# Patient Record
Sex: FEMALE | Race: BLACK OR AFRICAN AMERICAN | NOT HISPANIC OR LATINO | Employment: FULL TIME | RURAL
[De-identification: names, ages, dates, MRNs, and addresses within clinical notes are randomized per-mention and may not be internally consistent; named-entity substitution may affect disease eponyms.]

---

## 2019-10-09 ENCOUNTER — HISTORICAL (OUTPATIENT)
Dept: ADMINISTRATIVE | Facility: HOSPITAL | Age: 19
End: 2019-10-09

## 2019-10-11 LAB
LAB AP CLINICAL INFORMATION: NORMAL
LAB AP COMMENTS: NORMAL
LAB AP GENERAL CAT - HISTORICAL: NORMAL
LAB AP INTERPRETATION/RESULT - HISTORICAL: NEGATIVE
LAB AP SPECIMEN ADEQUACY - HISTORICAL: NORMAL
LAB AP SPECIMEN SUBMITTED - HISTORICAL: NORMAL

## 2020-10-27 ENCOUNTER — HISTORICAL (OUTPATIENT)
Dept: ADMINISTRATIVE | Facility: HOSPITAL | Age: 20
End: 2020-10-27

## 2020-10-27 LAB — HCG UR QL IA.RAPID: NEGATIVE

## 2021-05-17 ENCOUNTER — OFFICE VISIT (OUTPATIENT)
Dept: OBSTETRICS AND GYNECOLOGY | Facility: CLINIC | Age: 21
End: 2021-05-17
Payer: MEDICAID

## 2021-05-17 ENCOUNTER — PROCEDURE VISIT (OUTPATIENT)
Dept: OBSTETRICS AND GYNECOLOGY | Facility: CLINIC | Age: 21
End: 2021-05-17
Payer: MEDICAID

## 2021-05-17 VITALS
TEMPERATURE: 98 F | HEIGHT: 65 IN | RESPIRATION RATE: 18 BRPM | HEART RATE: 88 BPM | SYSTOLIC BLOOD PRESSURE: 120 MMHG | OXYGEN SATURATION: 99 % | BODY MASS INDEX: 34.66 KG/M2 | DIASTOLIC BLOOD PRESSURE: 72 MMHG | WEIGHT: 208 LBS

## 2021-05-17 DIAGNOSIS — N91.2 AMENORRHEA: Primary | ICD-10-CM

## 2021-05-17 LAB
B-HCG UR QL: POSITIVE
CTP QC/QA: YES

## 2021-05-17 PROCEDURE — 76801 OB US < 14 WKS SINGLE FETUS: CPT | Mod: ,,, | Performed by: OBSTETRICS & GYNECOLOGY

## 2021-05-17 PROCEDURE — 81025 POCT URINE PREGNANCY: ICD-10-PCS | Mod: ,,, | Performed by: OBSTETRICS & GYNECOLOGY

## 2021-05-17 PROCEDURE — 81025 URINE PREGNANCY TEST: CPT | Mod: ,,, | Performed by: OBSTETRICS & GYNECOLOGY

## 2021-05-17 PROCEDURE — 76801 PR US, OB <14WKS, TRANSABD, SINGLE GESTATION: ICD-10-PCS | Mod: ,,, | Performed by: OBSTETRICS & GYNECOLOGY

## 2021-05-17 PROCEDURE — 99203 PR OFFICE/OUTPT VISIT, NEW, LEVL III, 30-44 MIN: ICD-10-PCS | Mod: 25,,, | Performed by: OBSTETRICS & GYNECOLOGY

## 2021-05-17 PROCEDURE — 99499 NO LOS: ICD-10-PCS | Mod: ,,, | Performed by: OBSTETRICS & GYNECOLOGY

## 2021-05-17 PROCEDURE — 99499 UNLISTED E&M SERVICE: CPT | Mod: ,,, | Performed by: OBSTETRICS & GYNECOLOGY

## 2021-05-17 PROCEDURE — 99203 OFFICE O/P NEW LOW 30 MIN: CPT | Mod: 25,,, | Performed by: OBSTETRICS & GYNECOLOGY

## 2021-06-14 ENCOUNTER — PROCEDURE VISIT (OUTPATIENT)
Dept: OBSTETRICS AND GYNECOLOGY | Facility: CLINIC | Age: 21
End: 2021-06-14
Attending: OBSTETRICS & GYNECOLOGY
Payer: MEDICAID

## 2021-06-14 ENCOUNTER — ROUTINE PRENATAL (OUTPATIENT)
Dept: OBSTETRICS AND GYNECOLOGY | Facility: CLINIC | Age: 21
End: 2021-06-14
Payer: MEDICAID

## 2021-06-14 VITALS
BODY MASS INDEX: 34.28 KG/M2 | HEART RATE: 85 BPM | DIASTOLIC BLOOD PRESSURE: 80 MMHG | SYSTOLIC BLOOD PRESSURE: 118 MMHG | WEIGHT: 206 LBS

## 2021-06-14 DIAGNOSIS — Z11.3 SCREEN FOR SEXUALLY TRANSMITTED DISEASES: ICD-10-CM

## 2021-06-14 DIAGNOSIS — Z3A.09 9 WEEKS GESTATION OF PREGNANCY: Primary | ICD-10-CM

## 2021-06-14 DIAGNOSIS — O21.9 NAUSEA AND VOMITING DURING PREGNANCY PRIOR TO 22 WEEKS GESTATION: ICD-10-CM

## 2021-06-14 DIAGNOSIS — Z79.899 ENCOUNTER FOR LONG-TERM (CURRENT) USE OF OTHER MEDICATIONS: ICD-10-CM

## 2021-06-14 DIAGNOSIS — R76.8 HSV-2 SEROPOSITIVE: ICD-10-CM

## 2021-06-14 DIAGNOSIS — Z11.4 ENCOUNTER FOR SCREENING FOR HIV: ICD-10-CM

## 2021-06-14 DIAGNOSIS — Z36.9 UNSPECIFIED ANTENATAL SCREENING: ICD-10-CM

## 2021-06-14 DIAGNOSIS — Z3A.09 9 WEEKS GESTATION OF PREGNANCY: ICD-10-CM

## 2021-06-14 DIAGNOSIS — Z72.51 RISK FOR SEXUALLY TRANSMITTED DISEASE: ICD-10-CM

## 2021-06-14 LAB
BASOPHILS # BLD AUTO: 0.03 K/UL (ref 0–0.2)
BASOPHILS NFR BLD AUTO: 0.3 % (ref 0–1)
BILIRUB SERPL-MCNC: NORMAL MG/DL
BLOOD, POC UA: NORMAL
DIFFERENTIAL METHOD BLD: ABNORMAL
EOSINOPHIL # BLD AUTO: 0.07 K/UL (ref 0–0.5)
EOSINOPHIL NFR BLD AUTO: 0.7 % (ref 1–4)
ERYTHROCYTE [DISTWIDTH] IN BLOOD BY AUTOMATED COUNT: 12.4 % (ref 11.5–14.5)
EST. AVERAGE GLUCOSE BLD GHB EST-MCNC: 97 MG/DL
GLUCOSE UR QL STRIP: NORMAL
HBA1C MFR BLD HPLC: 5.5 % (ref 4.5–6.6)
HBV SURFACE AG SERPL QL IA: NORMAL
HCT VFR BLD AUTO: 40.6 % (ref 38–47)
HGB BLD-MCNC: 13.3 G/DL (ref 12–16)
HIV 1+O+2 AB SERPL QL: NORMAL
IMM GRANULOCYTES # BLD AUTO: 0.03 K/UL (ref 0–0.04)
IMM GRANULOCYTES NFR BLD: 0.3 % (ref 0–0.4)
INDIRECT COOMBS: NORMAL
KETONES UR QL STRIP: NORMAL
LEUKOCYTE ESTERASE URINE, POC: NORMAL
LYMPHOCYTES # BLD AUTO: 2.2 K/UL (ref 1–4.8)
LYMPHOCYTES NFR BLD AUTO: 23 % (ref 27–41)
MCH RBC QN AUTO: 27.5 PG (ref 27–31)
MCHC RBC AUTO-ENTMCNC: 32.8 G/DL (ref 32–36)
MCV RBC AUTO: 84.1 FL (ref 80–96)
MONOCYTES # BLD AUTO: 0.7 K/UL (ref 0–0.8)
MONOCYTES NFR BLD AUTO: 7.3 % (ref 2–6)
MPC BLD CALC-MCNC: 10 FL (ref 9.4–12.4)
NEUTROPHILS # BLD AUTO: 6.55 K/UL (ref 1.8–7.7)
NEUTROPHILS NFR BLD AUTO: 68.4 % (ref 53–65)
NITRITE, POC UA: NORMAL
NRBC # BLD AUTO: 0 X10E3/UL
NRBC, AUTO (.00): 0 %
PH, POC UA: 6
PLATELET # BLD AUTO: 379 K/UL (ref 150–400)
PROTEIN, POC: NORMAL
RBC # BLD AUTO: 4.83 M/UL (ref 4.2–5.4)
RH BLD: NORMAL
RUBV IGG SER-ACNC: NORMAL [IU]/ML
SPECIFIC GRAVITY, POC UA: >=1.03
SYPHILIS AB INTERPRETATION: NORMAL
TRICHOMONAS NAT: NEGATIVE
UROBILINOGEN, POC UA: 0.2
WBC # BLD AUTO: 9.58 K/UL (ref 4.5–11)

## 2021-06-14 PROCEDURE — 99214 PR OFFICE/OUTPT VISIT, EST, LEVL IV, 30-39 MIN: ICD-10-PCS | Mod: TH,,, | Performed by: OBSTETRICS & GYNECOLOGY

## 2021-06-14 PROCEDURE — 87491 CHLAMYDIA/GONORRHOEAE(GC), PCR: ICD-10-PCS | Mod: ,,, | Performed by: CLINICAL MEDICAL LABORATORY

## 2021-06-14 PROCEDURE — 99499 NO LOS: ICD-10-PCS | Mod: ,,, | Performed by: OBSTETRICS & GYNECOLOGY

## 2021-06-14 PROCEDURE — 87661 TRICHOMONAS VAGINALIS BY PCR: ICD-10-PCS | Mod: ,,, | Performed by: CLINICAL MEDICAL LABORATORY

## 2021-06-14 PROCEDURE — 83036 HEMOGLOBIN A1C: ICD-10-PCS | Mod: ,,, | Performed by: CLINICAL MEDICAL LABORATORY

## 2021-06-14 PROCEDURE — 86762 RUBELLA ANTIBODY: CPT | Mod: ,,, | Performed by: CLINICAL MEDICAL LABORATORY

## 2021-06-14 PROCEDURE — 87591 CHLAMYDIA/GONORRHOEAE(GC), PCR: ICD-10-PCS | Mod: ,,, | Performed by: CLINICAL MEDICAL LABORATORY

## 2021-06-14 PROCEDURE — 87086 CULTURE, URINE: ICD-10-PCS | Mod: ,,, | Performed by: CLINICAL MEDICAL LABORATORY

## 2021-06-14 PROCEDURE — 85025 CBC WITH DIFFERENTIAL: ICD-10-PCS | Mod: ,,, | Performed by: CLINICAL MEDICAL LABORATORY

## 2021-06-14 PROCEDURE — 87389 HIV 1 / 2 ANTIBODY: ICD-10-PCS | Mod: ,,, | Performed by: CLINICAL MEDICAL LABORATORY

## 2021-06-14 PROCEDURE — 85660 SICKLE CELL SCREEN: ICD-10-PCS | Mod: ,,, | Performed by: CLINICAL MEDICAL LABORATORY

## 2021-06-14 PROCEDURE — 83036 HEMOGLOBIN GLYCOSYLATED A1C: CPT | Mod: ,,, | Performed by: CLINICAL MEDICAL LABORATORY

## 2021-06-14 PROCEDURE — 86762 RUBELLA ANTIBODY SCREEN: ICD-10-PCS | Mod: ,,, | Performed by: CLINICAL MEDICAL LABORATORY

## 2021-06-14 PROCEDURE — 87491 CHLMYD TRACH DNA AMP PROBE: CPT | Mod: ,,, | Performed by: CLINICAL MEDICAL LABORATORY

## 2021-06-14 PROCEDURE — 87086 URINE CULTURE/COLONY COUNT: CPT | Mod: ,,, | Performed by: CLINICAL MEDICAL LABORATORY

## 2021-06-14 PROCEDURE — 99499 UNLISTED E&M SERVICE: CPT | Mod: ,,, | Performed by: OBSTETRICS & GYNECOLOGY

## 2021-06-14 PROCEDURE — 76801 PR US, OB <14WKS, TRANSABD, SINGLE GESTATION: ICD-10-PCS | Mod: ,,, | Performed by: OBSTETRICS & GYNECOLOGY

## 2021-06-14 PROCEDURE — 87340 HEPATITIS B SURFACE AG IA: CPT | Mod: ,,, | Performed by: CLINICAL MEDICAL LABORATORY

## 2021-06-14 PROCEDURE — 87591 N.GONORRHOEAE DNA AMP PROB: CPT | Mod: ,,, | Performed by: CLINICAL MEDICAL LABORATORY

## 2021-06-14 PROCEDURE — 85025 COMPLETE CBC W/AUTO DIFF WBC: CPT | Mod: ,,, | Performed by: CLINICAL MEDICAL LABORATORY

## 2021-06-14 PROCEDURE — 86780 TREPONEMA PALLIDUM: CPT | Mod: ,,, | Performed by: CLINICAL MEDICAL LABORATORY

## 2021-06-14 PROCEDURE — 76801 OB US < 14 WKS SINGLE FETUS: CPT | Mod: ,,, | Performed by: OBSTETRICS & GYNECOLOGY

## 2021-06-14 PROCEDURE — 36415 PR COLLECTION VENOUS BLOOD,VENIPUNCTURE: ICD-10-PCS | Mod: ,,, | Performed by: OBSTETRICS & GYNECOLOGY

## 2021-06-14 PROCEDURE — 86780 TREPONEMA PALLIDUM (SYPHILIS) ANTIBODY: ICD-10-PCS | Mod: ,,, | Performed by: CLINICAL MEDICAL LABORATORY

## 2021-06-14 PROCEDURE — 36415 COLL VENOUS BLD VENIPUNCTURE: CPT | Mod: ,,, | Performed by: OBSTETRICS & GYNECOLOGY

## 2021-06-14 PROCEDURE — 87661 TRICHOMONAS VAGINALIS AMPLIF: CPT | Mod: ,,, | Performed by: CLINICAL MEDICAL LABORATORY

## 2021-06-14 PROCEDURE — 85660 RBC SICKLE CELL TEST: CPT | Mod: ,,, | Performed by: CLINICAL MEDICAL LABORATORY

## 2021-06-14 PROCEDURE — 87389 HIV-1 AG W/HIV-1&-2 AB AG IA: CPT | Mod: ,,, | Performed by: CLINICAL MEDICAL LABORATORY

## 2021-06-14 PROCEDURE — 99214 OFFICE O/P EST MOD 30 MIN: CPT | Mod: TH,,, | Performed by: OBSTETRICS & GYNECOLOGY

## 2021-06-14 PROCEDURE — 87340 HEPATITIS B SURFACE ANTIGEN: ICD-10-PCS | Mod: ,,, | Performed by: CLINICAL MEDICAL LABORATORY

## 2021-06-14 RX ORDER — ACYCLOVIR 400 MG/1
TABLET ORAL 2 TIMES DAILY
COMMUNITY
End: 2022-01-04

## 2021-06-14 RX ORDER — VALACYCLOVIR HYDROCHLORIDE 1 G/1
1000 TABLET, FILM COATED ORAL DAILY
Qty: 30 TABLET | Refills: 11 | Status: SHIPPED | OUTPATIENT
Start: 2021-06-14 | End: 2022-08-15

## 2021-06-14 RX ORDER — ONDANSETRON 4 MG/1
4 TABLET, FILM COATED ORAL EVERY 6 HOURS PRN
Qty: 30 TABLET | Refills: 3 | Status: SHIPPED | OUTPATIENT
Start: 2021-06-14 | End: 2022-01-04

## 2021-06-15 LAB
CHLAMYDIA BY PCR: NEGATIVE
HGB S BLD QL SOLY: NEGATIVE
N. GONORRHOEAE (GC) BY PCR: NEGATIVE

## 2021-06-16 LAB — UA COMPLETE W REFLEX CULTURE PNL UR: NORMAL

## 2021-06-29 ENCOUNTER — CLINICAL SUPPORT (OUTPATIENT)
Dept: OBSTETRICS AND GYNECOLOGY | Facility: CLINIC | Age: 21
End: 2021-06-29
Payer: MEDICAID

## 2021-06-29 DIAGNOSIS — Z36.0 ENCOUNTER FOR ANTENATAL SCREENING FOR CHROMOSOMAL ANOMALIES: Primary | ICD-10-CM

## 2021-06-29 DIAGNOSIS — Z3A.11 11 WEEKS GESTATION OF PREGNANCY: ICD-10-CM

## 2021-07-01 ENCOUNTER — PATIENT MESSAGE (OUTPATIENT)
Dept: ADMINISTRATIVE | Facility: OTHER | Age: 21
End: 2021-07-01

## 2021-07-06 ENCOUNTER — ROUTINE PRENATAL (OUTPATIENT)
Dept: OBSTETRICS AND GYNECOLOGY | Facility: CLINIC | Age: 21
End: 2021-07-06
Payer: MEDICAID

## 2021-07-06 ENCOUNTER — TELEPHONE (OUTPATIENT)
Dept: OBSTETRICS AND GYNECOLOGY | Facility: CLINIC | Age: 21
End: 2021-07-06

## 2021-07-06 VITALS
DIASTOLIC BLOOD PRESSURE: 77 MMHG | WEIGHT: 213 LBS | SYSTOLIC BLOOD PRESSURE: 135 MMHG | HEART RATE: 90 BPM | BODY MASS INDEX: 35.45 KG/M2

## 2021-07-06 DIAGNOSIS — Z36.0 ENCOUNTER FOR ANTENATAL SCREENING FOR CHROMOSOMAL ANOMALIES: ICD-10-CM

## 2021-07-06 DIAGNOSIS — Z3A.12 12 WEEKS GESTATION OF PREGNANCY: Primary | ICD-10-CM

## 2021-07-06 DIAGNOSIS — Z36.9 UNSPECIFIED ANTENATAL SCREENING: ICD-10-CM

## 2021-07-06 LAB
BILIRUB SERPL-MCNC: NORMAL MG/DL
BLOOD, POC UA: NORMAL
GLUCOSE UR QL STRIP: NORMAL
KETONES UR QL STRIP: NORMAL
LEUKOCYTE ESTERASE URINE, POC: NORMAL
NITRITE, POC UA: NORMAL
PH, POC UA: 6
PROTEIN, POC: NORMAL
SPECIFIC GRAVITY, POC UA: >=1.03
UROBILINOGEN, POC UA: 0.2

## 2021-07-06 PROCEDURE — 99214 OFFICE O/P EST MOD 30 MIN: CPT | Mod: TH,,, | Performed by: OBSTETRICS & GYNECOLOGY

## 2021-07-06 PROCEDURE — 99214 PR OFFICE/OUTPT VISIT, EST, LEVL IV, 30-39 MIN: ICD-10-PCS | Mod: TH,,, | Performed by: OBSTETRICS & GYNECOLOGY

## 2021-07-27 ENCOUNTER — ROUTINE PRENATAL (OUTPATIENT)
Dept: OBSTETRICS AND GYNECOLOGY | Facility: CLINIC | Age: 21
End: 2021-07-27
Payer: MEDICAID

## 2021-07-27 VITALS
HEART RATE: 66 BPM | BODY MASS INDEX: 36.11 KG/M2 | DIASTOLIC BLOOD PRESSURE: 80 MMHG | SYSTOLIC BLOOD PRESSURE: 118 MMHG | WEIGHT: 217 LBS

## 2021-07-27 DIAGNOSIS — Z36.9 UNSPECIFIED ANTENATAL SCREENING: ICD-10-CM

## 2021-07-27 DIAGNOSIS — Z3A.15 15 WEEKS GESTATION OF PREGNANCY: Primary | ICD-10-CM

## 2021-07-27 PROCEDURE — 59425 ANTEPARTUM CARE ONLY: CPT | Mod: TH,,, | Performed by: OBSTETRICS & GYNECOLOGY

## 2021-07-27 PROCEDURE — 59425 PR ANTEPARTUM CARE ONLY, 4-6 VISITS: ICD-10-PCS | Mod: TH,,, | Performed by: OBSTETRICS & GYNECOLOGY

## 2021-08-09 ENCOUNTER — HOSPITAL ENCOUNTER (OUTPATIENT)
Dept: RADIOLOGY | Facility: HOSPITAL | Age: 21
Discharge: HOME | End: 2021-08-09
Attending: OBSTETRICS & GYNECOLOGY
Payer: MEDICAID

## 2021-08-09 DIAGNOSIS — Z3A.15 15 WEEKS GESTATION OF PREGNANCY: ICD-10-CM

## 2021-08-09 PROCEDURE — 76805 OB US >/= 14 WKS SNGL FETUS: CPT | Mod: 26,,, | Performed by: RADIOLOGY

## 2021-08-09 PROCEDURE — 76805 US OB 14+ WEEKS, TRANSABDOM, SINGLE GESTATION: ICD-10-PCS | Mod: 26,,, | Performed by: RADIOLOGY

## 2021-08-09 PROCEDURE — 76805 OB US >/= 14 WKS SNGL FETUS: CPT | Mod: TC

## 2021-08-17 ENCOUNTER — ROUTINE PRENATAL (OUTPATIENT)
Dept: OBSTETRICS AND GYNECOLOGY | Facility: CLINIC | Age: 21
End: 2021-08-17
Payer: MEDICAID

## 2021-08-17 VITALS
DIASTOLIC BLOOD PRESSURE: 74 MMHG | SYSTOLIC BLOOD PRESSURE: 118 MMHG | HEART RATE: 88 BPM | BODY MASS INDEX: 36.11 KG/M2 | WEIGHT: 217 LBS

## 2021-08-17 DIAGNOSIS — Z3A.18 18 WEEKS GESTATION OF PREGNANCY: Primary | ICD-10-CM

## 2021-08-17 DIAGNOSIS — R76.8 HSV-2 SEROPOSITIVE: ICD-10-CM

## 2021-08-17 DIAGNOSIS — Z36.9 UNSPECIFIED ANTENATAL SCREENING: ICD-10-CM

## 2021-08-17 PROCEDURE — 59425 PR ANTEPARTUM CARE ONLY, 4-6 VISITS: ICD-10-PCS | Mod: TH,,, | Performed by: OBSTETRICS & GYNECOLOGY

## 2021-08-17 PROCEDURE — 59425 ANTEPARTUM CARE ONLY: CPT | Mod: TH,,, | Performed by: OBSTETRICS & GYNECOLOGY

## 2021-09-02 ENCOUNTER — ROUTINE PRENATAL (OUTPATIENT)
Dept: OBSTETRICS AND GYNECOLOGY | Facility: CLINIC | Age: 21
End: 2021-09-02
Payer: MEDICAID

## 2021-09-02 VITALS
WEIGHT: 222 LBS | SYSTOLIC BLOOD PRESSURE: 127 MMHG | BODY MASS INDEX: 36.94 KG/M2 | DIASTOLIC BLOOD PRESSURE: 70 MMHG | HEART RATE: 108 BPM

## 2021-09-02 DIAGNOSIS — Z36.9 UNSPECIFIED ANTENATAL SCREENING: ICD-10-CM

## 2021-09-02 DIAGNOSIS — Z3A.20 20 WEEKS GESTATION OF PREGNANCY: Primary | ICD-10-CM

## 2021-09-02 LAB
BILIRUB SERPL-MCNC: NORMAL MG/DL
BLOOD, POC UA: NORMAL
GLUCOSE UR QL STRIP: 100
KETONES UR QL STRIP: 15
LEUKOCYTE ESTERASE URINE, POC: NORMAL
NITRITE, POC UA: NORMAL
PH, POC UA: 6
PROTEIN, POC: NORMAL
SPECIFIC GRAVITY, POC UA: >=1.03
UROBILINOGEN, POC UA: 0.2

## 2021-09-02 PROCEDURE — 59425 PR ANTEPARTUM CARE ONLY, 4-6 VISITS: ICD-10-PCS | Mod: TH,,, | Performed by: OBSTETRICS & GYNECOLOGY

## 2021-09-02 PROCEDURE — 59425 ANTEPARTUM CARE ONLY: CPT | Mod: TH,,, | Performed by: OBSTETRICS & GYNECOLOGY

## 2021-09-02 RX ORDER — VITAMIN- MINERAL OMEGA-3 SUPPLEMENT 53.5; 38; 25; 1; 2; 3; 1.8; 5; 25; 300; 12.5; 2; 5; 10; 2 MG/1; MG/1; MG/1; MG/1; MG/1; MG/1; MG/1; MG/1; MG/1; UG/1; UG/1; MG/1; MG/1; MG/1; MG/1
1 CAPSULE, LIQUID FILLED ORAL DAILY
COMMUNITY
End: 2022-08-15

## 2021-09-20 ENCOUNTER — ROUTINE PRENATAL (OUTPATIENT)
Dept: OBSTETRICS AND GYNECOLOGY | Facility: CLINIC | Age: 21
End: 2021-09-20
Payer: MEDICAID

## 2021-09-20 VITALS
DIASTOLIC BLOOD PRESSURE: 70 MMHG | BODY MASS INDEX: 37.61 KG/M2 | SYSTOLIC BLOOD PRESSURE: 139 MMHG | WEIGHT: 226 LBS | HEART RATE: 95 BPM

## 2021-09-20 DIAGNOSIS — R76.8 HSV-2 SEROPOSITIVE: ICD-10-CM

## 2021-09-20 DIAGNOSIS — Z36.9 UNSPECIFIED ANTENATAL SCREENING: ICD-10-CM

## 2021-09-20 DIAGNOSIS — Z3A.23 23 WEEKS GESTATION OF PREGNANCY: Primary | ICD-10-CM

## 2021-09-20 LAB
BILIRUB SERPL-MCNC: NORMAL MG/DL
BLOOD, POC UA: NORMAL
GLUCOSE UR QL STRIP: NORMAL
KETONES UR QL STRIP: NORMAL
LEUKOCYTE ESTERASE URINE, POC: NORMAL
NITRITE, POC UA: NORMAL
PH, POC UA: 6
PROTEIN, POC: NORMAL
SPECIFIC GRAVITY, POC UA: 1.02
UROBILINOGEN, POC UA: 0.2

## 2021-09-20 PROCEDURE — 59425 ANTEPARTUM CARE ONLY: CPT | Mod: TH,,, | Performed by: OBSTETRICS & GYNECOLOGY

## 2021-09-20 PROCEDURE — 59425 PR ANTEPARTUM CARE ONLY, 4-6 VISITS: ICD-10-PCS | Mod: TH,,, | Performed by: OBSTETRICS & GYNECOLOGY

## 2021-10-11 ENCOUNTER — ROUTINE PRENATAL (OUTPATIENT)
Dept: OBSTETRICS AND GYNECOLOGY | Facility: CLINIC | Age: 21
End: 2021-10-11
Payer: MEDICAID

## 2021-10-11 VITALS
HEART RATE: 108 BPM | DIASTOLIC BLOOD PRESSURE: 75 MMHG | WEIGHT: 230 LBS | BODY MASS INDEX: 38.27 KG/M2 | SYSTOLIC BLOOD PRESSURE: 133 MMHG

## 2021-10-11 DIAGNOSIS — Z36.9 UNSPECIFIED ANTENATAL SCREENING: ICD-10-CM

## 2021-10-11 DIAGNOSIS — Z3A.26 26 WEEKS GESTATION OF PREGNANCY: Primary | ICD-10-CM

## 2021-10-11 DIAGNOSIS — R76.8 HSV-2 SEROPOSITIVE: ICD-10-CM

## 2021-10-11 LAB
BILIRUB SERPL-MCNC: ABNORMAL MG/DL
BLOOD, POC UA: ABNORMAL
GLUCOSE UR QL STRIP: 100
KETONES UR QL STRIP: 15
LEUKOCYTE ESTERASE URINE, POC: ABNORMAL
NITRITE, POC UA: ABNORMAL
PH, POC UA: 6
PROTEIN, POC: ABNORMAL
SPECIFIC GRAVITY, POC UA: 1.02
UROBILINOGEN, POC UA: 0.2

## 2021-10-11 PROCEDURE — 59425 ANTEPARTUM CARE ONLY: CPT | Mod: TH,,, | Performed by: OBSTETRICS & GYNECOLOGY

## 2021-10-11 PROCEDURE — 59425 PR ANTEPARTUM CARE ONLY, 4-6 VISITS: ICD-10-PCS | Mod: TH,,, | Performed by: OBSTETRICS & GYNECOLOGY

## 2021-10-25 ENCOUNTER — ROUTINE PRENATAL (OUTPATIENT)
Dept: OBSTETRICS AND GYNECOLOGY | Facility: CLINIC | Age: 21
End: 2021-10-25
Payer: MEDICAID

## 2021-10-25 VITALS
SYSTOLIC BLOOD PRESSURE: 147 MMHG | DIASTOLIC BLOOD PRESSURE: 92 MMHG | BODY MASS INDEX: 38.61 KG/M2 | WEIGHT: 232 LBS | HEART RATE: 104 BPM

## 2021-10-25 DIAGNOSIS — Z36.9 UNSPECIFIED ANTENATAL SCREENING: ICD-10-CM

## 2021-10-25 DIAGNOSIS — Z36.89 ENCOUNTER FOR OTHER SPECIFIED ANTENATAL SCREENING: ICD-10-CM

## 2021-10-25 DIAGNOSIS — O12.13 PROTEINURIA AFFECTING PREGNANCY IN THIRD TRIMESTER: ICD-10-CM

## 2021-10-25 DIAGNOSIS — O13.3 GESTATIONAL HYPERTENSION, THIRD TRIMESTER: ICD-10-CM

## 2021-10-25 DIAGNOSIS — Z3A.28 28 WEEKS GESTATION OF PREGNANCY: Primary | ICD-10-CM

## 2021-10-25 LAB
BASOPHILS # BLD AUTO: 0.03 K/UL (ref 0–0.2)
BASOPHILS NFR BLD AUTO: 0.3 % (ref 0–1)
BILIRUB SERPL-MCNC: NORMAL MG/DL
BLOOD, POC UA: NORMAL
CTP QC/QA: YES
DIFFERENTIAL METHOD BLD: ABNORMAL
EOSINOPHIL # BLD AUTO: 0.05 K/UL (ref 0–0.5)
EOSINOPHIL NFR BLD AUTO: 0.5 % (ref 1–4)
ERYTHROCYTE [DISTWIDTH] IN BLOOD BY AUTOMATED COUNT: 13.2 % (ref 11.5–14.5)
GLUCOSE SERPL-MCNC: 279 MG/DL
GLUCOSE UR QL STRIP: NORMAL
HCT VFR BLD AUTO: 36 % (ref 38–47)
HGB BLD-MCNC: 11.6 G/DL (ref 12–16)
IMM GRANULOCYTES # BLD AUTO: 0.1 K/UL (ref 0–0.04)
IMM GRANULOCYTES NFR BLD: 0.9 % (ref 0–0.4)
KETONES UR QL STRIP: 15
LEUKOCYTE ESTERASE URINE, POC: NORMAL
LYMPHOCYTES # BLD AUTO: 1.53 K/UL (ref 1–4.8)
LYMPHOCYTES NFR BLD AUTO: 14.3 % (ref 27–41)
MCH RBC QN AUTO: 26.1 PG (ref 27–31)
MCHC RBC AUTO-ENTMCNC: 32.2 G/DL (ref 32–36)
MCV RBC AUTO: 81.1 FL (ref 80–96)
MONOCYTES # BLD AUTO: 0.85 K/UL (ref 0–0.8)
MONOCYTES NFR BLD AUTO: 7.9 % (ref 2–6)
MPC BLD CALC-MCNC: 11.4 FL (ref 9.4–12.4)
NEUTROPHILS # BLD AUTO: 8.15 K/UL (ref 1.8–7.7)
NEUTROPHILS NFR BLD AUTO: 76.1 % (ref 53–65)
NITRITE, POC UA: NORMAL
NRBC # BLD AUTO: 0 X10E3/UL
NRBC, AUTO (.00): 0 %
PH, POC UA: 6
PLATELET # BLD AUTO: 262 K/UL (ref 150–400)
POC (AMP) AMPHETAMINE: NEGATIVE
POC (BAR) BARBITURATES: NEGATIVE
POC (BUP) BUPRENORPHINE: NEGATIVE
POC (BZO) BENZODIAZEPINES: NEGATIVE
POC (COC) COCAINE: NEGATIVE
POC (MDMA) METHYLENEDIOXYMETHAMPHETAMINE 3,4: NEGATIVE
POC (MET) METHAMPHETAMINE: NEGATIVE
POC (MOP) OPIATES: NEGATIVE
POC (MTD) METHADONE: NEGATIVE
POC (OXY) OXYCODONE: NEGATIVE
POC (PCP) PHENCYCLIDINE: NEGATIVE
POC (TCA) TRICYCLIC ANTIDEPRESSANTS: NEGATIVE
POC TEMPERATURE (URINE): 90
POC THC: NEGATIVE
PROTEIN, POC: 30
RBC # BLD AUTO: 4.44 M/UL (ref 4.2–5.4)
SPECIFIC GRAVITY, POC UA: >=1.03
UROBILINOGEN, POC UA: 0.2
WBC # BLD AUTO: 10.71 K/UL (ref 4.5–11)

## 2021-10-25 PROCEDURE — 59425 ANTEPARTUM CARE ONLY: CPT | Mod: TH,,, | Performed by: OBSTETRICS & GYNECOLOGY

## 2021-10-25 PROCEDURE — 36415 COLL VENOUS BLD VENIPUNCTURE: CPT | Mod: ,,, | Performed by: OBSTETRICS & GYNECOLOGY

## 2021-10-25 PROCEDURE — 82950 GLUCOSE TEST: CPT | Mod: ,,, | Performed by: CLINICAL MEDICAL LABORATORY

## 2021-10-25 PROCEDURE — 82950 GLUCOSE, 1HR POST PRANDIAL: ICD-10-PCS | Mod: ,,, | Performed by: CLINICAL MEDICAL LABORATORY

## 2021-10-25 PROCEDURE — 80305 POCT URINE DRUG SCREEN PRESUMP: ICD-10-PCS | Mod: QW,,, | Performed by: OBSTETRICS & GYNECOLOGY

## 2021-10-25 PROCEDURE — 59425 PR ANTEPARTUM CARE ONLY, 4-6 VISITS: ICD-10-PCS | Mod: TH,,, | Performed by: OBSTETRICS & GYNECOLOGY

## 2021-10-25 PROCEDURE — 85025 COMPLETE CBC W/AUTO DIFF WBC: CPT | Mod: ,,, | Performed by: CLINICAL MEDICAL LABORATORY

## 2021-10-25 PROCEDURE — 36415 PR COLLECTION VENOUS BLOOD,VENIPUNCTURE: ICD-10-PCS | Mod: ,,, | Performed by: OBSTETRICS & GYNECOLOGY

## 2021-10-25 PROCEDURE — 80305 DRUG TEST PRSMV DIR OPT OBS: CPT | Mod: QW,,, | Performed by: OBSTETRICS & GYNECOLOGY

## 2021-10-25 PROCEDURE — 85025 CBC WITH DIFFERENTIAL: ICD-10-PCS | Mod: ,,, | Performed by: CLINICAL MEDICAL LABORATORY

## 2021-10-25 RX ORDER — LABETALOL 100 MG/1
100 TABLET, FILM COATED ORAL 2 TIMES DAILY
Qty: 60 TABLET | Refills: 11 | Status: SHIPPED | OUTPATIENT
Start: 2021-10-25 | End: 2022-08-15

## 2021-10-26 ENCOUNTER — CLINICAL SUPPORT (OUTPATIENT)
Dept: OBSTETRICS AND GYNECOLOGY | Facility: CLINIC | Age: 21
End: 2021-10-26
Payer: MEDICAID

## 2021-10-26 DIAGNOSIS — Z36.9 UNSPECIFIED ANTENATAL SCREENING: ICD-10-CM

## 2021-10-26 DIAGNOSIS — Z3A.28 28 WEEKS GESTATION OF PREGNANCY: Primary | ICD-10-CM

## 2021-10-26 DIAGNOSIS — O12.13 PROTEINURIA AFFECTING PREGNANCY IN THIRD TRIMESTER: ICD-10-CM

## 2021-10-26 DIAGNOSIS — O13.3 GESTATIONAL HYPERTENSION, THIRD TRIMESTER: ICD-10-CM

## 2021-10-26 PROCEDURE — 84156 ASSAY OF PROTEIN URINE: CPT | Mod: ,,, | Performed by: CLINICAL MEDICAL LABORATORY

## 2021-10-26 PROCEDURE — 36415 COLL VENOUS BLD VENIPUNCTURE: CPT | Mod: ,,, | Performed by: OBSTETRICS & GYNECOLOGY

## 2021-10-26 PROCEDURE — 36415 PR COLLECTION VENOUS BLOOD,VENIPUNCTURE: ICD-10-PCS | Mod: ,,, | Performed by: OBSTETRICS & GYNECOLOGY

## 2021-10-26 PROCEDURE — 84156 PROTEIN, URINE, 24 HOUR: ICD-10-PCS | Mod: ,,, | Performed by: CLINICAL MEDICAL LABORATORY

## 2021-10-26 PROCEDURE — 82565 CREATININE, SERUM: ICD-10-PCS | Mod: ,,, | Performed by: CLINICAL MEDICAL LABORATORY

## 2021-10-26 PROCEDURE — 82565 ASSAY OF CREATININE: CPT | Mod: ,,, | Performed by: CLINICAL MEDICAL LABORATORY

## 2021-10-27 LAB
CREAT SERPL-MCNC: 0.72 MG/DL (ref 0.55–1.02)
PROT 24H UR-MCNC: 326 MG/24 HR (ref 0–150)
PROT UR-MCNC: 19.2 MG/DL (ref 0–11.9)
TOTAL VOLUME, URINE (ML): 1700 ML

## 2021-10-28 ENCOUNTER — TELEPHONE (OUTPATIENT)
Dept: OBSTETRICS AND GYNECOLOGY | Facility: CLINIC | Age: 21
End: 2021-10-28
Payer: MEDICAID

## 2021-10-28 DIAGNOSIS — O24.419 GESTATIONAL DIABETES MELLITUS (GDM) IN THIRD TRIMESTER, GESTATIONAL DIABETES METHOD OF CONTROL UNSPECIFIED: ICD-10-CM

## 2021-10-28 DIAGNOSIS — Z3A.29 29 WEEKS GESTATION OF PREGNANCY: Primary | ICD-10-CM

## 2021-11-01 ENCOUNTER — ROUTINE PRENATAL (OUTPATIENT)
Dept: OBSTETRICS AND GYNECOLOGY | Facility: CLINIC | Age: 21
End: 2021-11-01
Payer: MEDICAID

## 2021-11-01 VITALS
DIASTOLIC BLOOD PRESSURE: 71 MMHG | SYSTOLIC BLOOD PRESSURE: 125 MMHG | HEART RATE: 110 BPM | WEIGHT: 233 LBS | BODY MASS INDEX: 38.77 KG/M2

## 2021-11-01 DIAGNOSIS — Z36.9 UNSPECIFIED ANTENATAL SCREENING: ICD-10-CM

## 2021-11-01 DIAGNOSIS — O13.3 GESTATIONAL HYPERTENSION, THIRD TRIMESTER: ICD-10-CM

## 2021-11-01 DIAGNOSIS — Z3A.29 29 WEEKS GESTATION OF PREGNANCY: Primary | ICD-10-CM

## 2021-11-01 DIAGNOSIS — O24.419 GESTATIONAL DIABETES MELLITUS (GDM) IN THIRD TRIMESTER, GESTATIONAL DIABETES METHOD OF CONTROL UNSPECIFIED: ICD-10-CM

## 2021-11-01 LAB
BILIRUB SERPL-MCNC: NORMAL MG/DL
BLOOD, POC UA: NORMAL
GLUCOSE UR QL STRIP: >=1000
KETONES UR QL STRIP: 15
LEUKOCYTE ESTERASE URINE, POC: NORMAL
NITRITE, POC UA: NORMAL
PH, POC UA: 6
PROTEIN, POC: NORMAL
SPECIFIC GRAVITY, POC UA: >=1.03
UROBILINOGEN, POC UA: 0.2

## 2021-11-01 PROCEDURE — 59426 PR ANTEPARTUM CARE ONLY, >7 VISITS: ICD-10-PCS | Mod: TH,,, | Performed by: OBSTETRICS & GYNECOLOGY

## 2021-11-01 PROCEDURE — 59426 ANTEPARTUM CARE ONLY: CPT | Mod: TH,,, | Performed by: OBSTETRICS & GYNECOLOGY

## 2021-11-01 RX ORDER — DEXTROSE 4 G
1 TABLET,CHEWABLE ORAL
Qty: 1 EACH | Refills: 0 | Status: ON HOLD | OUTPATIENT
Start: 2021-11-01 | End: 2022-01-10 | Stop reason: HOSPADM

## 2021-11-01 RX ORDER — LANCETS
1 EACH MISCELLANEOUS 4 TIMES DAILY
Qty: 120 EACH | Refills: 11 | Status: ON HOLD | OUTPATIENT
Start: 2021-11-01 | End: 2022-01-10 | Stop reason: HOSPADM

## 2021-11-08 ENCOUNTER — PROCEDURE VISIT (OUTPATIENT)
Dept: OBSTETRICS AND GYNECOLOGY | Facility: CLINIC | Age: 21
End: 2021-11-08
Payer: MEDICAID

## 2021-11-08 ENCOUNTER — ROUTINE PRENATAL (OUTPATIENT)
Dept: OBSTETRICS AND GYNECOLOGY | Facility: CLINIC | Age: 21
End: 2021-11-08
Payer: MEDICAID

## 2021-11-08 VITALS
BODY MASS INDEX: 38.77 KG/M2 | SYSTOLIC BLOOD PRESSURE: 108 MMHG | DIASTOLIC BLOOD PRESSURE: 62 MMHG | WEIGHT: 233 LBS | HEART RATE: 101 BPM

## 2021-11-08 DIAGNOSIS — Z3A.30 30 WEEKS GESTATION OF PREGNANCY: Primary | ICD-10-CM

## 2021-11-08 DIAGNOSIS — Z36.9 UNSPECIFIED ANTENATAL SCREENING: ICD-10-CM

## 2021-11-08 DIAGNOSIS — O13.3 GESTATIONAL HYPERTENSION, THIRD TRIMESTER: Primary | ICD-10-CM

## 2021-11-08 DIAGNOSIS — Z3A.31 31 WEEKS GESTATION OF PREGNANCY: ICD-10-CM

## 2021-11-08 LAB
BILIRUB SERPL-MCNC: NORMAL MG/DL
BLOOD, POC UA: NORMAL
GLUCOSE UR QL STRIP: 250
KETONES UR QL STRIP: 15
LEUKOCYTE ESTERASE URINE, POC: NORMAL
NITRITE, POC UA: NORMAL
PH, POC UA: 6
PROTEIN, POC: NORMAL
SPECIFIC GRAVITY, POC UA: >=1.03
UROBILINOGEN, POC UA: 0.2

## 2021-11-08 PROCEDURE — 76819 FETAL BIOPHYS PROFIL W/O NST: CPT | Mod: ,,, | Performed by: OBSTETRICS & GYNECOLOGY

## 2021-11-08 PROCEDURE — 99499 UNLISTED E&M SERVICE: CPT | Mod: ,,, | Performed by: OBSTETRICS & GYNECOLOGY

## 2021-11-08 PROCEDURE — 76805 PR US, OB 14+WKS, TRANSABD, SINGLE GESTATION: ICD-10-PCS | Mod: ,,, | Performed by: OBSTETRICS & GYNECOLOGY

## 2021-11-08 PROCEDURE — 59426 ANTEPARTUM CARE ONLY: CPT | Mod: TH,,, | Performed by: OBSTETRICS & GYNECOLOGY

## 2021-11-08 PROCEDURE — 59426 PR ANTEPARTUM CARE ONLY, >7 VISITS: ICD-10-PCS | Mod: TH,,, | Performed by: OBSTETRICS & GYNECOLOGY

## 2021-11-08 PROCEDURE — 76805 OB US >/= 14 WKS SNGL FETUS: CPT | Mod: ,,, | Performed by: OBSTETRICS & GYNECOLOGY

## 2021-11-08 PROCEDURE — 76819 PR US, OB, FETAL BIOPHYSICAL, W/O NST: ICD-10-PCS | Mod: ,,, | Performed by: OBSTETRICS & GYNECOLOGY

## 2021-11-08 PROCEDURE — 99499 NO LOS: ICD-10-PCS | Mod: ,,, | Performed by: OBSTETRICS & GYNECOLOGY

## 2021-11-09 ENCOUNTER — TELEPHONE (OUTPATIENT)
Dept: OBSTETRICS AND GYNECOLOGY | Facility: CLINIC | Age: 21
End: 2021-11-09
Payer: MEDICAID

## 2021-11-09 RX ORDER — INSULIN HUMAN 100 [IU]/ML
INJECTION, SUSPENSION SUBCUTANEOUS
Qty: 9 ML | Refills: 11 | Status: ON HOLD | OUTPATIENT
Start: 2021-11-09 | End: 2022-01-10 | Stop reason: HOSPADM

## 2021-11-15 ENCOUNTER — ROUTINE PRENATAL (OUTPATIENT)
Dept: OBSTETRICS AND GYNECOLOGY | Facility: CLINIC | Age: 21
End: 2021-11-15
Payer: MEDICAID

## 2021-11-15 VITALS
BODY MASS INDEX: 38.77 KG/M2 | HEART RATE: 110 BPM | DIASTOLIC BLOOD PRESSURE: 76 MMHG | SYSTOLIC BLOOD PRESSURE: 121 MMHG | WEIGHT: 233 LBS

## 2021-11-15 DIAGNOSIS — O13.3 GESTATIONAL HYPERTENSION, THIRD TRIMESTER: ICD-10-CM

## 2021-11-15 DIAGNOSIS — Z86.19 HISTORY OF POSITIVE PCR FOR HERPES SIMPLEX VIRUS TYPE 2 (HSV-2) DNA: ICD-10-CM

## 2021-11-15 DIAGNOSIS — Z3A.31 31 WEEKS GESTATION OF PREGNANCY: Primary | ICD-10-CM

## 2021-11-15 DIAGNOSIS — O12.13 PROTEINURIA AFFECTING PREGNANCY IN THIRD TRIMESTER: ICD-10-CM

## 2021-11-15 DIAGNOSIS — Z36.9 UNSPECIFIED ANTENATAL SCREENING: ICD-10-CM

## 2021-11-15 DIAGNOSIS — O24.419 GESTATIONAL DIABETES MELLITUS (GDM) IN THIRD TRIMESTER, GESTATIONAL DIABETES METHOD OF CONTROL UNSPECIFIED: ICD-10-CM

## 2021-11-15 PROCEDURE — 59426 ANTEPARTUM CARE ONLY: CPT | Mod: TH,,, | Performed by: OBSTETRICS & GYNECOLOGY

## 2021-11-15 PROCEDURE — 59426 PR ANTEPARTUM CARE ONLY, >7 VISITS: ICD-10-PCS | Mod: TH,,, | Performed by: OBSTETRICS & GYNECOLOGY

## 2021-11-29 ENCOUNTER — ROUTINE PRENATAL (OUTPATIENT)
Dept: OBSTETRICS AND GYNECOLOGY | Facility: CLINIC | Age: 21
End: 2021-11-29
Payer: MEDICAID

## 2021-11-29 ENCOUNTER — PROCEDURE VISIT (OUTPATIENT)
Dept: OBSTETRICS AND GYNECOLOGY | Facility: CLINIC | Age: 21
End: 2021-11-29
Payer: MEDICAID

## 2021-11-29 VITALS
DIASTOLIC BLOOD PRESSURE: 72 MMHG | WEIGHT: 237 LBS | BODY MASS INDEX: 39.44 KG/M2 | HEART RATE: 102 BPM | SYSTOLIC BLOOD PRESSURE: 115 MMHG

## 2021-11-29 DIAGNOSIS — Z3A.33 33 WEEKS GESTATION OF PREGNANCY: Primary | ICD-10-CM

## 2021-11-29 DIAGNOSIS — O13.3 GESTATIONAL HYPERTENSION, THIRD TRIMESTER: ICD-10-CM

## 2021-11-29 DIAGNOSIS — O24.419 GESTATIONAL DIABETES MELLITUS (GDM) IN THIRD TRIMESTER, GESTATIONAL DIABETES METHOD OF CONTROL UNSPECIFIED: ICD-10-CM

## 2021-11-29 DIAGNOSIS — Z36.9 UNSPECIFIED ANTENATAL SCREENING: ICD-10-CM

## 2021-11-29 PROCEDURE — 59426 ANTEPARTUM CARE ONLY: CPT | Mod: TH,,, | Performed by: OBSTETRICS & GYNECOLOGY

## 2021-11-29 PROCEDURE — 99499 UNLISTED E&M SERVICE: CPT | Mod: ,,, | Performed by: OBSTETRICS & GYNECOLOGY

## 2021-11-29 PROCEDURE — 59426 PR ANTEPARTUM CARE ONLY, >7 VISITS: ICD-10-PCS | Mod: TH,,, | Performed by: OBSTETRICS & GYNECOLOGY

## 2021-11-29 PROCEDURE — 76805 PR US, OB 14+WKS, TRANSABD, SINGLE GESTATION: ICD-10-PCS | Mod: ,,, | Performed by: OBSTETRICS & GYNECOLOGY

## 2021-11-29 PROCEDURE — 76819 PR US, OB, FETAL BIOPHYSICAL, W/O NST: ICD-10-PCS | Mod: ,,, | Performed by: OBSTETRICS & GYNECOLOGY

## 2021-11-29 PROCEDURE — 99499 NO LOS: ICD-10-PCS | Mod: ,,, | Performed by: OBSTETRICS & GYNECOLOGY

## 2021-11-29 PROCEDURE — 76805 OB US >/= 14 WKS SNGL FETUS: CPT | Mod: ,,, | Performed by: OBSTETRICS & GYNECOLOGY

## 2021-11-29 PROCEDURE — 76819 FETAL BIOPHYS PROFIL W/O NST: CPT | Mod: ,,, | Performed by: OBSTETRICS & GYNECOLOGY

## 2021-11-29 RX ORDER — SYRINGE AND NEEDLE,INSULIN,1ML 31GX15/64"
1 SYRINGE, EMPTY DISPOSABLE MISCELLANEOUS 4 TIMES DAILY
Qty: 90 EACH | Refills: 11 | Status: ON HOLD | OUTPATIENT
Start: 2021-11-29 | End: 2022-01-10 | Stop reason: HOSPADM

## 2021-12-06 ENCOUNTER — PROCEDURE VISIT (OUTPATIENT)
Dept: OBSTETRICS AND GYNECOLOGY | Facility: CLINIC | Age: 21
End: 2021-12-06
Payer: MEDICAID

## 2021-12-06 ENCOUNTER — ROUTINE PRENATAL (OUTPATIENT)
Dept: OBSTETRICS AND GYNECOLOGY | Facility: CLINIC | Age: 21
End: 2021-12-06
Payer: MEDICAID

## 2021-12-06 VITALS
HEART RATE: 100 BPM | SYSTOLIC BLOOD PRESSURE: 121 MMHG | WEIGHT: 231 LBS | BODY MASS INDEX: 38.44 KG/M2 | DIASTOLIC BLOOD PRESSURE: 70 MMHG

## 2021-12-06 DIAGNOSIS — O13.3 GESTATIONAL HYPERTENSION, THIRD TRIMESTER: ICD-10-CM

## 2021-12-06 DIAGNOSIS — O24.419 GESTATIONAL DIABETES MELLITUS (GDM) IN THIRD TRIMESTER, GESTATIONAL DIABETES METHOD OF CONTROL UNSPECIFIED: ICD-10-CM

## 2021-12-06 DIAGNOSIS — Z36.9 UNSPECIFIED ANTENATAL SCREENING: ICD-10-CM

## 2021-12-06 DIAGNOSIS — Z86.19 HISTORY OF POSITIVE PCR FOR HERPES SIMPLEX VIRUS TYPE 2 (HSV-2) DNA: ICD-10-CM

## 2021-12-06 DIAGNOSIS — Z3A.34 34 WEEKS GESTATION OF PREGNANCY: Primary | ICD-10-CM

## 2021-12-06 DIAGNOSIS — O12.13 PROTEINURIA AFFECTING PREGNANCY IN THIRD TRIMESTER: ICD-10-CM

## 2021-12-06 LAB
BILIRUB SERPL-MCNC: NORMAL MG/DL
BLOOD, POC UA: NORMAL
GLUCOSE UR QL STRIP: NORMAL
KETONES UR QL STRIP: NORMAL
LEUKOCYTE ESTERASE URINE, POC: NORMAL
NITRITE, POC UA: NORMAL
PH, POC UA: 5.5
PROTEIN, POC: NORMAL
SPECIFIC GRAVITY, POC UA: >=1.03
UROBILINOGEN, POC UA: 0.2

## 2021-12-06 PROCEDURE — 76819 PR US, OB, FETAL BIOPHYSICAL, W/O NST: ICD-10-PCS | Mod: ,,, | Performed by: OBSTETRICS & GYNECOLOGY

## 2021-12-06 PROCEDURE — 76819 FETAL BIOPHYS PROFIL W/O NST: CPT | Mod: ,,, | Performed by: OBSTETRICS & GYNECOLOGY

## 2021-12-06 PROCEDURE — 99499 NO LOS: ICD-10-PCS | Mod: ,,, | Performed by: OBSTETRICS & GYNECOLOGY

## 2021-12-06 PROCEDURE — 76805 PR US, OB 14+WKS, TRANSABD, SINGLE GESTATION: ICD-10-PCS | Mod: ,,, | Performed by: OBSTETRICS & GYNECOLOGY

## 2021-12-06 PROCEDURE — 59426 ANTEPARTUM CARE ONLY: CPT | Mod: TH,,, | Performed by: OBSTETRICS & GYNECOLOGY

## 2021-12-06 PROCEDURE — 99499 UNLISTED E&M SERVICE: CPT | Mod: ,,, | Performed by: OBSTETRICS & GYNECOLOGY

## 2021-12-06 PROCEDURE — 76805 OB US >/= 14 WKS SNGL FETUS: CPT | Mod: ,,, | Performed by: OBSTETRICS & GYNECOLOGY

## 2021-12-06 PROCEDURE — 59426 PR ANTEPARTUM CARE ONLY, >7 VISITS: ICD-10-PCS | Mod: TH,,, | Performed by: OBSTETRICS & GYNECOLOGY

## 2021-12-14 ENCOUNTER — ROUTINE PRENATAL (OUTPATIENT)
Dept: OBSTETRICS AND GYNECOLOGY | Facility: CLINIC | Age: 21
End: 2021-12-14
Payer: MEDICAID

## 2021-12-14 ENCOUNTER — PROCEDURE VISIT (OUTPATIENT)
Dept: OBSTETRICS AND GYNECOLOGY | Facility: CLINIC | Age: 21
End: 2021-12-14
Payer: MEDICAID

## 2021-12-14 VITALS
WEIGHT: 235 LBS | DIASTOLIC BLOOD PRESSURE: 74 MMHG | HEART RATE: 103 BPM | BODY MASS INDEX: 39.11 KG/M2 | SYSTOLIC BLOOD PRESSURE: 114 MMHG

## 2021-12-14 DIAGNOSIS — Z36.85 ANTENATAL SCREENING FOR STREPTOCOCCUS B: ICD-10-CM

## 2021-12-14 DIAGNOSIS — Z11.3 SCREENING FOR STDS (SEXUALLY TRANSMITTED DISEASES): ICD-10-CM

## 2021-12-14 DIAGNOSIS — Z3A.35 35 WEEKS GESTATION OF PREGNANCY: Primary | ICD-10-CM

## 2021-12-14 DIAGNOSIS — O13.3 GESTATIONAL HYPERTENSION, THIRD TRIMESTER: ICD-10-CM

## 2021-12-14 DIAGNOSIS — Z72.51 HIGH RISK SEXUAL BEHAVIOR, UNSPECIFIED TYPE: ICD-10-CM

## 2021-12-14 DIAGNOSIS — Z36.89 ENCOUNTER FOR OTHER SPECIFIED ANTENATAL SCREENING: ICD-10-CM

## 2021-12-14 DIAGNOSIS — O24.419 GESTATIONAL DIABETES MELLITUS (GDM) IN THIRD TRIMESTER, GESTATIONAL DIABETES METHOD OF CONTROL UNSPECIFIED: ICD-10-CM

## 2021-12-14 DIAGNOSIS — Z36.9 UNSPECIFIED ANTENATAL SCREENING: ICD-10-CM

## 2021-12-14 LAB
BASOPHILS # BLD AUTO: 0.02 K/UL (ref 0–0.2)
BASOPHILS NFR BLD AUTO: 0.2 % (ref 0–1)
BILIRUB SERPL-MCNC: NORMAL MG/DL
BLOOD, POC UA: NORMAL
CANDIDA SPECIES: NEGATIVE
CTP QC/QA: YES
DIFFERENTIAL METHOD BLD: ABNORMAL
EOSINOPHIL # BLD AUTO: 0.04 K/UL (ref 0–0.5)
EOSINOPHIL NFR BLD AUTO: 0.4 % (ref 1–4)
ERYTHROCYTE [DISTWIDTH] IN BLOOD BY AUTOMATED COUNT: 14.3 % (ref 11.5–14.5)
GARDNERELLA: NEGATIVE
GLUCOSE UR QL STRIP: NORMAL
HCT VFR BLD AUTO: 34.9 % (ref 38–47)
HGB BLD-MCNC: 10.8 G/DL (ref 12–16)
IMM GRANULOCYTES # BLD AUTO: 0.06 K/UL (ref 0–0.04)
IMM GRANULOCYTES NFR BLD: 0.7 % (ref 0–0.4)
KETONES UR QL STRIP: 40
LEUKOCYTE ESTERASE URINE, POC: NORMAL
LYMPHOCYTES # BLD AUTO: 1.46 K/UL (ref 1–4.8)
LYMPHOCYTES NFR BLD AUTO: 16 % (ref 27–41)
MCH RBC QN AUTO: 24.4 PG (ref 27–31)
MCHC RBC AUTO-ENTMCNC: 30.9 G/DL (ref 32–36)
MCV RBC AUTO: 79 FL (ref 80–96)
MONOCYTES # BLD AUTO: 0.83 K/UL (ref 0–0.8)
MONOCYTES NFR BLD AUTO: 9.1 % (ref 2–6)
MPC BLD CALC-MCNC: 11.4 FL (ref 9.4–12.4)
NEUTROPHILS # BLD AUTO: 6.71 K/UL (ref 1.8–7.7)
NEUTROPHILS NFR BLD AUTO: 73.6 % (ref 53–65)
NITRITE, POC UA: NORMAL
NRBC # BLD AUTO: 0 X10E3/UL
NRBC, AUTO (.00): 0 %
PH, POC UA: 6.5
PLATELET # BLD AUTO: 230 K/UL (ref 150–400)
POC (AMP) AMPHETAMINE: NEGATIVE
POC (BAR) BARBITURATES: NEGATIVE
POC (BUP) BUPRENORPHINE: NEGATIVE
POC (BZO) BENZODIAZEPINES: NEGATIVE
POC (COC) COCAINE: NEGATIVE
POC (MDMA) METHYLENEDIOXYMETHAMPHETAMINE 3,4: NEGATIVE
POC (MET) METHAMPHETAMINE: NEGATIVE
POC (MOP) OPIATES: NEGATIVE
POC (MTD) METHADONE: NEGATIVE
POC (OXY) OXYCODONE: NEGATIVE
POC (PCP) PHENCYCLIDINE: NEGATIVE
POC (TCA) TRICYCLIC ANTIDEPRESSANTS: NEGATIVE
POC TEMPERATURE (URINE): 90
POC THC: NEGATIVE
PROTEIN, POC: NORMAL
RBC # BLD AUTO: 4.42 M/UL (ref 4.2–5.4)
SPECIFIC GRAVITY, POC UA: 1.02
SYPHILIS AB INTERPRETATION: NORMAL
TRICHOMONAS: NEGATIVE
UROBILINOGEN, POC UA: 0.2
WBC # BLD AUTO: 9.12 K/UL (ref 4.5–11)

## 2021-12-14 PROCEDURE — 87510 BACTERIAL VAGINOSIS: ICD-10-PCS | Mod: ,,, | Performed by: CLINICAL MEDICAL LABORATORY

## 2021-12-14 PROCEDURE — 87480 CANDIDA DNA DIR PROBE: CPT | Mod: ,,, | Performed by: CLINICAL MEDICAL LABORATORY

## 2021-12-14 PROCEDURE — 99499 NO LOS: ICD-10-PCS | Mod: ,,, | Performed by: OBSTETRICS & GYNECOLOGY

## 2021-12-14 PROCEDURE — 76805 PR US, OB 14+WKS, TRANSABD, SINGLE GESTATION: ICD-10-PCS | Mod: ,,, | Performed by: OBSTETRICS & GYNECOLOGY

## 2021-12-14 PROCEDURE — 87491 CHLMYD TRACH DNA AMP PROBE: CPT | Mod: ,,, | Performed by: CLINICAL MEDICAL LABORATORY

## 2021-12-14 PROCEDURE — 80305 DRUG TEST PRSMV DIR OPT OBS: CPT | Mod: QW,,, | Performed by: OBSTETRICS & GYNECOLOGY

## 2021-12-14 PROCEDURE — 76819 PR US, OB, FETAL BIOPHYSICAL, W/O NST: ICD-10-PCS | Mod: ,,, | Performed by: OBSTETRICS & GYNECOLOGY

## 2021-12-14 PROCEDURE — 87660 TRICHOMONAS VAGIN DIR PROBE: CPT | Mod: ,,, | Performed by: CLINICAL MEDICAL LABORATORY

## 2021-12-14 PROCEDURE — 87510 GARDNER VAG DNA DIR PROBE: CPT | Mod: ,,, | Performed by: CLINICAL MEDICAL LABORATORY

## 2021-12-14 PROCEDURE — 86780 TREPONEMA PALLIDUM: CPT | Mod: ,,, | Performed by: CLINICAL MEDICAL LABORATORY

## 2021-12-14 PROCEDURE — 76805 OB US >/= 14 WKS SNGL FETUS: CPT | Mod: ,,, | Performed by: OBSTETRICS & GYNECOLOGY

## 2021-12-14 PROCEDURE — 85025 COMPLETE CBC W/AUTO DIFF WBC: CPT | Mod: ,,, | Performed by: CLINICAL MEDICAL LABORATORY

## 2021-12-14 PROCEDURE — 99499 UNLISTED E&M SERVICE: CPT | Mod: ,,, | Performed by: OBSTETRICS & GYNECOLOGY

## 2021-12-14 PROCEDURE — 59426 ANTEPARTUM CARE ONLY: CPT | Mod: TH,,, | Performed by: OBSTETRICS & GYNECOLOGY

## 2021-12-14 PROCEDURE — 87660 BACTERIAL VAGINOSIS: ICD-10-PCS | Mod: ,,, | Performed by: CLINICAL MEDICAL LABORATORY

## 2021-12-14 PROCEDURE — 87480 BACTERIAL VAGINOSIS: ICD-10-PCS | Mod: ,,, | Performed by: CLINICAL MEDICAL LABORATORY

## 2021-12-14 PROCEDURE — 86780 TREPONEMA PALLIDUM (SYPHILIS) ANTIBODY: ICD-10-PCS | Mod: ,,, | Performed by: CLINICAL MEDICAL LABORATORY

## 2021-12-14 PROCEDURE — 59426 PR ANTEPARTUM CARE ONLY, >7 VISITS: ICD-10-PCS | Mod: TH,,, | Performed by: OBSTETRICS & GYNECOLOGY

## 2021-12-14 PROCEDURE — 87653 STREP B SCREEN, ANTEPARTUM: ICD-10-PCS | Mod: ,,, | Performed by: CLINICAL MEDICAL LABORATORY

## 2021-12-14 PROCEDURE — 36415 COLL VENOUS BLD VENIPUNCTURE: CPT | Mod: ,,, | Performed by: OBSTETRICS & GYNECOLOGY

## 2021-12-14 PROCEDURE — 80305 POCT URINE DRUG SCREEN PRESUMP: ICD-10-PCS | Mod: QW,,, | Performed by: OBSTETRICS & GYNECOLOGY

## 2021-12-14 PROCEDURE — 85025 CBC WITH DIFFERENTIAL: ICD-10-PCS | Mod: ,,, | Performed by: CLINICAL MEDICAL LABORATORY

## 2021-12-14 PROCEDURE — 87653 STREP B DNA AMP PROBE: CPT | Mod: ,,, | Performed by: CLINICAL MEDICAL LABORATORY

## 2021-12-14 PROCEDURE — 36415 PR COLLECTION VENOUS BLOOD,VENIPUNCTURE: ICD-10-PCS | Mod: ,,, | Performed by: OBSTETRICS & GYNECOLOGY

## 2021-12-14 PROCEDURE — 87491 CHLAMYDIA/GONORRHOEAE(GC), PCR: ICD-10-PCS | Mod: ,,, | Performed by: CLINICAL MEDICAL LABORATORY

## 2021-12-14 PROCEDURE — 76819 FETAL BIOPHYS PROFIL W/O NST: CPT | Mod: ,,, | Performed by: OBSTETRICS & GYNECOLOGY

## 2021-12-14 PROCEDURE — 87591 N.GONORRHOEAE DNA AMP PROB: CPT | Mod: ,,, | Performed by: CLINICAL MEDICAL LABORATORY

## 2021-12-14 PROCEDURE — 87591 CHLAMYDIA/GONORRHOEAE(GC), PCR: ICD-10-PCS | Mod: ,,, | Performed by: CLINICAL MEDICAL LABORATORY

## 2021-12-15 LAB
CHLAMYDIA BY PCR: NEGATIVE
GROUP B STREP, PCR: NEGATIVE
N. GONORRHOEAE (GC) BY PCR: NEGATIVE

## 2021-12-21 ENCOUNTER — ROUTINE PRENATAL (OUTPATIENT)
Dept: OBSTETRICS AND GYNECOLOGY | Facility: CLINIC | Age: 21
End: 2021-12-21
Payer: MEDICAID

## 2021-12-21 ENCOUNTER — HOSPITAL ENCOUNTER (OUTPATIENT)
Dept: RADIOLOGY | Facility: HOSPITAL | Age: 21
Discharge: HOME | End: 2021-12-21
Attending: OBSTETRICS & GYNECOLOGY
Payer: MEDICAID

## 2021-12-21 VITALS
DIASTOLIC BLOOD PRESSURE: 76 MMHG | WEIGHT: 240 LBS | BODY MASS INDEX: 39.94 KG/M2 | HEART RATE: 90 BPM | SYSTOLIC BLOOD PRESSURE: 128 MMHG

## 2021-12-21 DIAGNOSIS — Z86.19 HISTORY OF HERPES GENITALIS: ICD-10-CM

## 2021-12-21 DIAGNOSIS — Z36.89 ENCOUNTER FOR ULTRASOUND TO CHECK FETAL GROWTH: ICD-10-CM

## 2021-12-21 DIAGNOSIS — Z36.9 UNSPECIFIED ANTENATAL SCREENING: ICD-10-CM

## 2021-12-21 DIAGNOSIS — O13.3 GESTATIONAL HYPERTENSION, THIRD TRIMESTER: ICD-10-CM

## 2021-12-21 DIAGNOSIS — Z3A.31 31 WEEKS GESTATION OF PREGNANCY: ICD-10-CM

## 2021-12-21 DIAGNOSIS — O24.419 GESTATIONAL DIABETES MELLITUS (GDM) IN THIRD TRIMESTER, GESTATIONAL DIABETES METHOD OF CONTROL UNSPECIFIED: ICD-10-CM

## 2021-12-21 DIAGNOSIS — Z3A.36 36 WEEKS GESTATION OF PREGNANCY: Primary | ICD-10-CM

## 2021-12-21 LAB
BILIRUB SERPL-MCNC: NORMAL MG/DL
BLOOD, POC UA: NORMAL
GLUCOSE UR QL STRIP: NORMAL
KETONES UR QL STRIP: NORMAL
LEUKOCYTE ESTERASE URINE, POC: NORMAL
NITRITE, POC UA: NORMAL
PH, POC UA: 6.5
PROTEIN, POC: NORMAL
SPECIFIC GRAVITY, POC UA: 1.02
UROBILINOGEN, POC UA: 0.2

## 2021-12-21 PROCEDURE — 76805 OB US >/= 14 WKS SNGL FETUS: CPT | Mod: 26,,, | Performed by: RADIOLOGY

## 2021-12-21 PROCEDURE — 76805 OB US >/= 14 WKS SNGL FETUS: CPT | Mod: TC

## 2021-12-21 PROCEDURE — 76819 FETAL BIOPHYS PROFIL W/O NST: CPT | Mod: 26,,, | Performed by: RADIOLOGY

## 2021-12-21 PROCEDURE — 76805 US OB 14+ WEEKS, TRANSABDOM W/ BIOPHYSICAL PROFILE, W/O NST, SINGLE GESTATION (XPD): ICD-10-PCS | Mod: 26,,, | Performed by: RADIOLOGY

## 2021-12-21 PROCEDURE — 59426 PR ANTEPARTUM CARE ONLY, >7 VISITS: ICD-10-PCS | Mod: TH,,, | Performed by: ADVANCED PRACTICE MIDWIFE

## 2021-12-21 PROCEDURE — 59426 ANTEPARTUM CARE ONLY: CPT | Mod: TH,,, | Performed by: ADVANCED PRACTICE MIDWIFE

## 2021-12-21 PROCEDURE — 76819 US OB 14+ WEEKS, TRANSABDOM W/ BIOPHYSICAL PROFILE, W/O NST, SINGLE GESTATION (XPD): ICD-10-PCS | Mod: 26,,, | Performed by: RADIOLOGY

## 2021-12-29 ENCOUNTER — ROUTINE PRENATAL (OUTPATIENT)
Dept: OBSTETRICS AND GYNECOLOGY | Facility: CLINIC | Age: 21
End: 2021-12-29
Payer: MEDICAID

## 2021-12-29 VITALS
DIASTOLIC BLOOD PRESSURE: 84 MMHG | SYSTOLIC BLOOD PRESSURE: 134 MMHG | WEIGHT: 235 LBS | HEART RATE: 93 BPM | BODY MASS INDEX: 39.11 KG/M2

## 2021-12-29 DIAGNOSIS — Z3A.37 37 WEEKS GESTATION OF PREGNANCY: Primary | ICD-10-CM

## 2021-12-29 PROCEDURE — 59426 PR ANTEPARTUM CARE ONLY, >7 VISITS: ICD-10-PCS | Mod: TH,,, | Performed by: ADVANCED PRACTICE MIDWIFE

## 2021-12-29 PROCEDURE — 59426 ANTEPARTUM CARE ONLY: CPT | Mod: TH,,, | Performed by: ADVANCED PRACTICE MIDWIFE

## 2022-01-04 ENCOUNTER — ROUTINE PRENATAL (OUTPATIENT)
Dept: OBSTETRICS AND GYNECOLOGY | Facility: CLINIC | Age: 22
End: 2022-01-04
Payer: MEDICAID

## 2022-01-04 VITALS
SYSTOLIC BLOOD PRESSURE: 124 MMHG | WEIGHT: 237 LBS | BODY MASS INDEX: 39.44 KG/M2 | DIASTOLIC BLOOD PRESSURE: 80 MMHG | HEART RATE: 93 BPM

## 2022-01-04 DIAGNOSIS — Z36.9 UNSPECIFIED ANTENATAL SCREENING: ICD-10-CM

## 2022-01-04 DIAGNOSIS — Z3A.38 38 WEEKS GESTATION OF PREGNANCY: Primary | ICD-10-CM

## 2022-01-04 LAB
BILIRUB SERPL-MCNC: NORMAL MG/DL
BLOOD, POC UA: NORMAL
GLUCOSE UR QL STRIP: 100
KETONES UR QL STRIP: NORMAL
LEUKOCYTE ESTERASE URINE, POC: NORMAL
NITRITE, POC UA: NORMAL
PH, POC UA: 6
PROTEIN, POC: 30
SPECIFIC GRAVITY, POC UA: >=1.03
UROBILINOGEN, POC UA: 0.2

## 2022-01-04 PROCEDURE — 59426 ANTEPARTUM CARE ONLY: CPT | Mod: TH,,, | Performed by: OBSTETRICS & GYNECOLOGY

## 2022-01-04 PROCEDURE — 59426 PR ANTEPARTUM CARE ONLY, >7 VISITS: ICD-10-PCS | Mod: TH,,, | Performed by: OBSTETRICS & GYNECOLOGY

## 2022-01-04 RX ORDER — PROCHLORPERAZINE EDISYLATE 5 MG/ML
5 INJECTION INTRAMUSCULAR; INTRAVENOUS EVERY 6 HOURS PRN
Status: CANCELLED | OUTPATIENT
Start: 2022-01-04

## 2022-01-04 RX ORDER — OXYTOCIN/RINGER'S LACTATE 30/500 ML
95 PLASTIC BAG, INJECTION (ML) INTRAVENOUS ONCE
Status: CANCELLED | OUTPATIENT
Start: 2022-01-04 | End: 2022-01-04

## 2022-01-04 RX ORDER — SODIUM CHLORIDE 9 MG/ML
INJECTION, SOLUTION INTRAVENOUS
Status: CANCELLED | OUTPATIENT
Start: 2022-01-04

## 2022-01-04 RX ORDER — METHYLERGONOVINE MALEATE 0.2 MG/ML
200 INJECTION INTRAVENOUS
Status: CANCELLED | OUTPATIENT
Start: 2022-01-04

## 2022-01-04 RX ORDER — ONDANSETRON 4 MG/1
8 TABLET, ORALLY DISINTEGRATING ORAL EVERY 8 HOURS PRN
Status: CANCELLED | OUTPATIENT
Start: 2022-01-04

## 2022-01-04 RX ORDER — OXYTOCIN/RINGER'S LACTATE 30/500 ML
334 PLASTIC BAG, INJECTION (ML) INTRAVENOUS ONCE
Status: CANCELLED | OUTPATIENT
Start: 2022-01-04 | End: 2022-01-04

## 2022-01-04 RX ORDER — MUPIROCIN 20 MG/G
OINTMENT TOPICAL
Status: CANCELLED | OUTPATIENT
Start: 2022-01-04

## 2022-01-04 RX ORDER — OXYTOCIN/RINGER'S LACTATE 30/500 ML
0-30 PLASTIC BAG, INJECTION (ML) INTRAVENOUS CONTINUOUS
Status: CANCELLED | OUTPATIENT
Start: 2022-01-04

## 2022-01-04 RX ORDER — BUTORPHANOL TARTRATE 1 MG/ML
1 INJECTION INTRAMUSCULAR; INTRAVENOUS
Status: CANCELLED | OUTPATIENT
Start: 2022-01-04

## 2022-01-04 RX ORDER — SODIUM CHLORIDE, SODIUM LACTATE, POTASSIUM CHLORIDE, CALCIUM CHLORIDE 600; 310; 30; 20 MG/100ML; MG/100ML; MG/100ML; MG/100ML
INJECTION, SOLUTION INTRAVENOUS CONTINUOUS
Status: CANCELLED | OUTPATIENT
Start: 2022-01-04

## 2022-01-04 RX ORDER — BUTORPHANOL TARTRATE 1 MG/ML
2 INJECTION INTRAMUSCULAR; INTRAVENOUS
Status: CANCELLED | OUTPATIENT
Start: 2022-01-04

## 2022-01-04 RX ORDER — SIMETHICONE 80 MG
1 TABLET,CHEWABLE ORAL 4 TIMES DAILY PRN
Status: CANCELLED | OUTPATIENT
Start: 2022-01-04

## 2022-01-04 RX ORDER — CALCIUM CARBONATE 200(500)MG
500 TABLET,CHEWABLE ORAL 3 TIMES DAILY PRN
Status: CANCELLED | OUTPATIENT
Start: 2022-01-04

## 2022-01-04 RX ORDER — MISOPROSTOL 100 UG/1
800 TABLET ORAL
Status: CANCELLED | OUTPATIENT
Start: 2022-01-04

## 2022-01-04 NOTE — PROGRESS NOTES
21 y.o. female  at 38w3d   Reports fetal movement or fluttering. Denies any vaginal bleeding, leakage of fluid, cramping, contractions, or pressure.   She complains of contractions that are irregular in pattern.  Pt states she is doing well without any concerns.     Blood Sugar results:  Fastin-123 (7 results)  Breakfast:  (5 results 3 over 120)  Lunch: 105-146 (6 results 3 over 120)  Dinner:  (5 results 3 over 120)      Pt is to go to [facility] for Pitocin induction at midnight on Friday night to have a baby 2022.  Daily fetal kick counts, bleeding, and  labor/labor precautions discussed.  Questions answered to desired level of satisfaction  Verbalized understanding to all information and instructions provided.    Total weight gain/weight loss in pregnancy: 14.1 kg (31 lb)     A: 38w3d     ICD-10-CM ICD-9-CM    1. 38 weeks gestation of pregnancy  Z3A.38 V22.2 POCT URINALYSIS   2. Unspecified  screening  Z36.9 V28.9 POCT URINALYSIS

## 2022-01-06 ENCOUNTER — HOSPITAL ENCOUNTER (OUTPATIENT)
Dept: RADIOLOGY | Facility: HOSPITAL | Age: 22
Discharge: HOME | End: 2022-01-06
Attending: ADVANCED PRACTICE MIDWIFE
Payer: MEDICAID

## 2022-01-06 DIAGNOSIS — Z36.89 ENCOUNTER FOR ULTRASOUND TO CHECK FETAL GROWTH: ICD-10-CM

## 2022-01-06 PROCEDURE — 76819 FETAL BIOPHYS PROFIL W/O NST: CPT | Mod: 26,,, | Performed by: RADIOLOGY

## 2022-01-06 PROCEDURE — 76819 FETAL BIOPHYS PROFIL W/O NST: CPT | Mod: TC

## 2022-01-06 PROCEDURE — 76819 US OB 14+ WEEKS, TRANSABDOM W/ BIOPHYSICAL PROFILE, W/O NST, SINGLE GESTATION (XPD): ICD-10-PCS | Mod: 26,,, | Performed by: RADIOLOGY

## 2022-01-06 PROCEDURE — 76805 US OB 14+ WEEKS, TRANSABDOM W/ BIOPHYSICAL PROFILE, W/O NST, SINGLE GESTATION (XPD): ICD-10-PCS | Mod: 26,,, | Performed by: RADIOLOGY

## 2022-01-06 PROCEDURE — 76805 OB US >/= 14 WKS SNGL FETUS: CPT | Mod: 26,,, | Performed by: RADIOLOGY

## 2022-01-08 ENCOUNTER — ANESTHESIA (OUTPATIENT)
Dept: OBSTETRICS AND GYNECOLOGY | Facility: HOSPITAL | Age: 22
End: 2022-01-08
Payer: MEDICAID

## 2022-01-08 ENCOUNTER — HOSPITAL ENCOUNTER (INPATIENT)
Facility: HOSPITAL | Age: 22
LOS: 3 days | Discharge: HOME | End: 2022-01-11
Attending: OBSTETRICS & GYNECOLOGY | Admitting: OBSTETRICS & GYNECOLOGY
Payer: MEDICAID

## 2022-01-08 ENCOUNTER — ANESTHESIA EVENT (OUTPATIENT)
Dept: OBSTETRICS AND GYNECOLOGY | Facility: HOSPITAL | Age: 22
End: 2022-01-08
Payer: MEDICAID

## 2022-01-08 DIAGNOSIS — Z98.891 S/P CESAREAN SECTION: Primary | ICD-10-CM

## 2022-01-08 DIAGNOSIS — Z3A.39 39 WEEKS GESTATION OF PREGNANCY: ICD-10-CM

## 2022-01-08 DIAGNOSIS — O24.414 INSULIN CONTROLLED GESTATIONAL DIABETES MELLITUS (GDM) IN THIRD TRIMESTER: ICD-10-CM

## 2022-01-08 DIAGNOSIS — O13.3 GESTATIONAL HYPERTENSION, THIRD TRIMESTER: ICD-10-CM

## 2022-01-08 DIAGNOSIS — Z3A.38 38 WEEKS GESTATION OF PREGNANCY: ICD-10-CM

## 2022-01-08 LAB
ALBUMIN SERPL BCP-MCNC: 2.9 G/DL (ref 3.5–5)
ALBUMIN/GLOB SERPL: 0.6 {RATIO}
ALP SERPL-CCNC: 131 U/L (ref 37–98)
ALT SERPL W P-5'-P-CCNC: 25 U/L (ref 13–56)
ANION GAP SERPL CALCULATED.3IONS-SCNC: 16 MMOL/L (ref 7–16)
AST SERPL W P-5'-P-CCNC: 17 U/L (ref 15–37)
BASOPHILS # BLD AUTO: 0.02 K/UL (ref 0–0.2)
BASOPHILS NFR BLD AUTO: 0.3 % (ref 0–1)
BILIRUB SERPL-MCNC: 0.4 MG/DL (ref 0–1.2)
BILIRUB UR QL STRIP: NEGATIVE
BUN SERPL-MCNC: 10 MG/DL (ref 7–18)
BUN/CREAT SERPL: 18 (ref 6–20)
CALCIUM SERPL-MCNC: 8.8 MG/DL (ref 8.5–10.1)
CHLORIDE SERPL-SCNC: 103 MMOL/L (ref 98–107)
CLARITY UR: CLEAR
CO2 SERPL-SCNC: 23 MMOL/L (ref 21–32)
COLOR UR: YELLOW
CREAT SERPL-MCNC: 0.57 MG/DL (ref 0.55–1.02)
CRENATED CELLS: ABNORMAL
DIFFERENTIAL METHOD BLD: ABNORMAL
EOSINOPHIL # BLD AUTO: 0.05 K/UL (ref 0–0.5)
EOSINOPHIL NFR BLD AUTO: 0.7 % (ref 1–4)
ERYTHROCYTE [DISTWIDTH] IN BLOOD BY AUTOMATED COUNT: 14.4 % (ref 11.5–14.5)
GLOBULIN SER-MCNC: 4.6 G/DL (ref 2–4)
GLUCOSE SERPL-MCNC: 72 MG/DL (ref 70–105)
GLUCOSE SERPL-MCNC: 72 MG/DL (ref 74–106)
GLUCOSE SERPL-MCNC: 76 MG/DL (ref 70–105)
GLUCOSE SERPL-MCNC: 88 MG/DL (ref 70–105)
GLUCOSE SERPL-MCNC: 88 MG/DL (ref 70–105)
GLUCOSE SERPL-MCNC: 96 MG/DL (ref 70–105)
GLUCOSE UR STRIP-MCNC: NEGATIVE MG/DL
HCT VFR BLD AUTO: 33.1 % (ref 38–47)
HGB BLD-MCNC: 10.8 G/DL (ref 12–16)
HYPOCHROMIA BLD QL SMEAR: ABNORMAL
IMM GRANULOCYTES # BLD AUTO: 0.04 K/UL (ref 0–0.04)
IMM GRANULOCYTES NFR BLD: 0.5 % (ref 0–0.4)
INDIRECT COOMBS: NORMAL
KETONES UR STRIP-SCNC: NEGATIVE MG/DL
LEUKOCYTE ESTERASE UR QL STRIP: NEGATIVE
LYMPHOCYTES # BLD AUTO: 1.65 K/UL (ref 1–4.8)
LYMPHOCYTES NFR BLD AUTO: 21.8 % (ref 27–41)
MCH RBC QN AUTO: 24.1 PG (ref 27–31)
MCHC RBC AUTO-ENTMCNC: 32.6 G/DL (ref 32–36)
MCV RBC AUTO: 73.7 FL (ref 80–96)
MICROCYTES BLD QL SMEAR: ABNORMAL
MONOCYTES # BLD AUTO: 0.58 K/UL (ref 0–0.8)
MONOCYTES NFR BLD AUTO: 7.7 % (ref 2–6)
MPC BLD CALC-MCNC: 10.9 FL (ref 9.4–12.4)
NEUTROPHILS # BLD AUTO: 5.22 K/UL (ref 1.8–7.7)
NEUTROPHILS NFR BLD AUTO: 69 % (ref 53–65)
NITRITE UR QL STRIP: NEGATIVE
NRBC # BLD AUTO: 0 X10E3/UL
NRBC, AUTO (.00): 0 %
OVALOCYTES BLD QL SMEAR: ABNORMAL
PH UR STRIP: 6 PH UNITS
PLATELET # BLD AUTO: 210 K/UL (ref 150–400)
PLATELET MORPHOLOGY: ABNORMAL
POLYCHROMASIA BLD QL SMEAR: ABNORMAL
POTASSIUM SERPL-SCNC: 4 MMOL/L (ref 3.5–5.1)
PROT SERPL-MCNC: 7.5 G/DL (ref 6.4–8.2)
PROT UR QL STRIP: NEGATIVE
RBC # BLD AUTO: 4.49 M/UL (ref 4.2–5.4)
RBC # UR STRIP: NEGATIVE /UL
RH BLD: NORMAL
SODIUM SERPL-SCNC: 138 MMOL/L (ref 136–145)
SP GR UR STRIP: >=1.03
SYPHILIS AB INTERPRETATION: NORMAL
UROBILINOGEN UR STRIP-ACNC: 0.2 MG/DL
WBC # BLD AUTO: 7.56 K/UL (ref 4.5–11)

## 2022-01-08 PROCEDURE — 82962 GLUCOSE BLOOD TEST: CPT

## 2022-01-08 PROCEDURE — 37000009 HC ANESTHESIA EA ADD 15 MINS: Performed by: OBSTETRICS & GYNECOLOGY

## 2022-01-08 PROCEDURE — 59515 CESAREAN DELIVERY: CPT | Mod: TH,,, | Performed by: OBSTETRICS & GYNECOLOGY

## 2022-01-08 PROCEDURE — 62326 NJX INTERLAMINAR LMBR/SAC: CPT | Performed by: ANESTHESIOLOGY

## 2022-01-08 PROCEDURE — 59410 OBSTETRICAL CARE: CPT | Mod: ANES,,, | Performed by: ANESTHESIOLOGY

## 2022-01-08 PROCEDURE — 01968 PR INSERT CATH,ART,PERCUT,SHORTTERM: ICD-10-PCS | Mod: CRNA,,, | Performed by: NURSE ANESTHETIST, CERTIFIED REGISTERED

## 2022-01-08 PROCEDURE — 63600175 PHARM REV CODE 636 W HCPCS: Performed by: ANESTHESIOLOGY

## 2022-01-08 PROCEDURE — 63600175 PHARM REV CODE 636 W HCPCS: Performed by: OBSTETRICS & GYNECOLOGY

## 2022-01-08 PROCEDURE — 86850 RBC ANTIBODY SCREEN: CPT | Performed by: OBSTETRICS & GYNECOLOGY

## 2022-01-08 PROCEDURE — 27201423 OPTIME MED/SURG SUP & DEVICES STERILE SUPPLY: Performed by: OBSTETRICS & GYNECOLOGY

## 2022-01-08 PROCEDURE — 37000008 HC ANESTHESIA 1ST 15 MINUTES: Performed by: OBSTETRICS & GYNECOLOGY

## 2022-01-08 PROCEDURE — C1765 ADHESION BARRIER: HCPCS | Performed by: OBSTETRICS & GYNECOLOGY

## 2022-01-08 PROCEDURE — 71000039 HC RECOVERY, EACH ADD'L HOUR: Performed by: OBSTETRICS & GYNECOLOGY

## 2022-01-08 PROCEDURE — 36415 COLL VENOUS BLD VENIPUNCTURE: CPT | Performed by: OBSTETRICS & GYNECOLOGY

## 2022-01-08 PROCEDURE — 59515 PR CESAREAN DELIVERY+POSTPARTUM CARE: ICD-10-PCS | Mod: TH,,, | Performed by: OBSTETRICS & GYNECOLOGY

## 2022-01-08 PROCEDURE — 36004724 HC OB OR TIME LEV III - 1ST 15 MIN: Performed by: OBSTETRICS & GYNECOLOGY

## 2022-01-08 PROCEDURE — 01968 ANES/ANALG CS DLVR NEURAXIAL: CPT | Mod: CRNA,,, | Performed by: NURSE ANESTHETIST, CERTIFIED REGISTERED

## 2022-01-08 PROCEDURE — 59410 PRA OBSTE CARE,VAG DELIV+POSTPARTUM: ICD-10-PCS | Mod: ANES,,, | Performed by: ANESTHESIOLOGY

## 2022-01-08 PROCEDURE — 59410 PRA OBSTE CARE,VAG DELIV+POSTPARTUM: ICD-10-PCS | Mod: CRNA,,, | Performed by: NURSE ANESTHETIST, CERTIFIED REGISTERED

## 2022-01-08 PROCEDURE — 25000003 PHARM REV CODE 250: Performed by: ANESTHESIOLOGY

## 2022-01-08 PROCEDURE — 25000003 PHARM REV CODE 250

## 2022-01-08 PROCEDURE — 80053 COMPREHEN METABOLIC PANEL: CPT | Performed by: OBSTETRICS & GYNECOLOGY

## 2022-01-08 PROCEDURE — 72100003 HC LABOR CARE, EA. ADDL. 8 HRS

## 2022-01-08 PROCEDURE — 01968 PR INSERT CATH,ART,PERCUT,SHORTTERM: ICD-10-PCS | Mod: ANES,,, | Performed by: ANESTHESIOLOGY

## 2022-01-08 PROCEDURE — 86780 TREPONEMA PALLIDUM: CPT | Performed by: OBSTETRICS & GYNECOLOGY

## 2022-01-08 PROCEDURE — 36004725 HC OB OR TIME LEV III - EA ADD 15 MIN: Performed by: OBSTETRICS & GYNECOLOGY

## 2022-01-08 PROCEDURE — 59410 OBSTETRICAL CARE: CPT | Mod: CRNA,,, | Performed by: NURSE ANESTHETIST, CERTIFIED REGISTERED

## 2022-01-08 PROCEDURE — 81003 URINALYSIS AUTO W/O SCOPE: CPT | Performed by: OBSTETRICS & GYNECOLOGY

## 2022-01-08 PROCEDURE — 11000001 HC ACUTE MED/SURG PRIVATE ROOM

## 2022-01-08 PROCEDURE — 01968 ANES/ANALG CS DLVR NEURAXIAL: CPT | Mod: ANES,,, | Performed by: ANESTHESIOLOGY

## 2022-01-08 PROCEDURE — 71000033 HC RECOVERY, INTIAL HOUR: Performed by: OBSTETRICS & GYNECOLOGY

## 2022-01-08 PROCEDURE — 25000003 PHARM REV CODE 250: Performed by: OBSTETRICS & GYNECOLOGY

## 2022-01-08 PROCEDURE — 85025 COMPLETE CBC W/AUTO DIFF WBC: CPT | Performed by: OBSTETRICS & GYNECOLOGY

## 2022-01-08 RX ORDER — MUPIROCIN 20 MG/G
OINTMENT TOPICAL 2 TIMES DAILY
Status: CANCELLED | OUTPATIENT
Start: 2022-01-08 | End: 2022-01-13

## 2022-01-08 RX ORDER — AMOXICILLIN 250 MG
1 CAPSULE ORAL NIGHTLY PRN
Status: DISCONTINUED | OUTPATIENT
Start: 2022-01-08 | End: 2022-01-11 | Stop reason: HOSPADM

## 2022-01-08 RX ORDER — EPHEDRINE SULFATE 50 MG/ML
10 INJECTION, SOLUTION INTRAVENOUS ONCE AS NEEDED
Status: CANCELLED | OUTPATIENT
Start: 2022-01-08 | End: 2033-06-05

## 2022-01-08 RX ORDER — ONDANSETRON 4 MG/1
8 TABLET, ORALLY DISINTEGRATING ORAL EVERY 8 HOURS PRN
Status: DISCONTINUED | OUTPATIENT
Start: 2022-01-08 | End: 2022-01-11 | Stop reason: HOSPADM

## 2022-01-08 RX ORDER — METOCLOPRAMIDE HYDROCHLORIDE 5 MG/ML
INJECTION INTRAMUSCULAR; INTRAVENOUS
Status: DISCONTINUED
Start: 2022-01-08 | End: 2022-01-08 | Stop reason: WASHOUT

## 2022-01-08 RX ORDER — BUTORPHANOL TARTRATE 1 MG/ML
1 INJECTION INTRAMUSCULAR; INTRAVENOUS
Status: DISCONTINUED | OUTPATIENT
Start: 2022-01-08 | End: 2022-01-08

## 2022-01-08 RX ORDER — ONDANSETRON 2 MG/ML
INJECTION INTRAMUSCULAR; INTRAVENOUS
Status: DISPENSED
Start: 2022-01-08 | End: 2022-01-08

## 2022-01-08 RX ORDER — ONDANSETRON 2 MG/ML
4 INJECTION INTRAMUSCULAR; INTRAVENOUS EVERY 6 HOURS PRN
Status: ACTIVE | OUTPATIENT
Start: 2022-01-08 | End: 2022-01-09

## 2022-01-08 RX ORDER — SODIUM CHLORIDE, SODIUM LACTATE, POTASSIUM CHLORIDE, CALCIUM CHLORIDE 600; 310; 30; 20 MG/100ML; MG/100ML; MG/100ML; MG/100ML
INJECTION, SOLUTION INTRAVENOUS CONTINUOUS
Status: DISCONTINUED | OUTPATIENT
Start: 2022-01-08 | End: 2022-01-10

## 2022-01-08 RX ORDER — OXYCODONE HYDROCHLORIDE 5 MG/1
10 TABLET ORAL EVERY 4 HOURS PRN
Status: DISPENSED | OUTPATIENT
Start: 2022-01-08 | End: 2022-01-09

## 2022-01-08 RX ORDER — ONDANSETRON 4 MG/1
8 TABLET, ORALLY DISINTEGRATING ORAL EVERY 8 HOURS PRN
Status: DISCONTINUED | OUTPATIENT
Start: 2022-01-08 | End: 2022-01-08

## 2022-01-08 RX ORDER — KETOROLAC TROMETHAMINE 30 MG/ML
30 INJECTION, SOLUTION INTRAMUSCULAR; INTRAVENOUS EVERY 6 HOURS
Status: DISCONTINUED | OUTPATIENT
Start: 2022-01-08 | End: 2022-01-09

## 2022-01-08 RX ORDER — EPHEDRINE SULFATE 50 MG/ML
INJECTION, SOLUTION INTRAVENOUS
Status: DISCONTINUED
Start: 2022-01-08 | End: 2022-01-08 | Stop reason: WASHOUT

## 2022-01-08 RX ORDER — OXYCODONE HYDROCHLORIDE 5 MG/1
5 TABLET ORAL EVERY 4 HOURS PRN
Status: DISPENSED | OUTPATIENT
Start: 2022-01-08 | End: 2022-01-09

## 2022-01-08 RX ORDER — SODIUM CHLORIDE 0.9 % (FLUSH) 0.9 %
10 SYRINGE (ML) INJECTION
Status: DISCONTINUED | OUTPATIENT
Start: 2022-01-08 | End: 2022-01-11 | Stop reason: HOSPADM

## 2022-01-08 RX ORDER — BUTORPHANOL TARTRATE 1 MG/ML
2 INJECTION INTRAMUSCULAR; INTRAVENOUS
Status: DISCONTINUED | OUTPATIENT
Start: 2022-01-08 | End: 2022-01-08

## 2022-01-08 RX ORDER — OXYTOCIN/RINGER'S LACTATE 30/500 ML
95 PLASTIC BAG, INJECTION (ML) INTRAVENOUS ONCE
Status: DISCONTINUED | OUTPATIENT
Start: 2022-01-08 | End: 2022-01-08

## 2022-01-08 RX ORDER — ADHESIVE BANDAGE
30 BANDAGE TOPICAL 2 TIMES DAILY PRN
Status: DISCONTINUED | OUTPATIENT
Start: 2022-01-09 | End: 2022-01-11 | Stop reason: HOSPADM

## 2022-01-08 RX ORDER — DOCUSATE SODIUM 100 MG/1
200 CAPSULE, LIQUID FILLED ORAL 2 TIMES DAILY
Status: DISCONTINUED | OUTPATIENT
Start: 2022-01-08 | End: 2022-01-11 | Stop reason: HOSPADM

## 2022-01-08 RX ORDER — FAMOTIDINE 10 MG/ML
20 INJECTION INTRAVENOUS ONCE AS NEEDED
Status: CANCELLED | OUTPATIENT
Start: 2022-01-08 | End: 2033-06-05

## 2022-01-08 RX ORDER — EPHEDRINE SULFATE 50 MG/ML
INJECTION, SOLUTION INTRAVENOUS
Status: DISPENSED
Start: 2022-01-08 | End: 2022-01-08

## 2022-01-08 RX ORDER — PRENATAL WITH FERROUS FUM AND FOLIC ACID 3080; 920; 120; 400; 22; 1.84; 3; 20; 10; 1; 12; 200; 27; 25; 2 [IU]/1; [IU]/1; MG/1; [IU]/1; MG/1; MG/1; MG/1; MG/1; MG/1; MG/1; UG/1; MG/1; MG/1; MG/1; MG/1
1 TABLET ORAL DAILY
Status: DISCONTINUED | OUTPATIENT
Start: 2022-01-08 | End: 2022-01-11 | Stop reason: HOSPADM

## 2022-01-08 RX ORDER — METHYLERGONOVINE MALEATE 0.2 MG/ML
200 INJECTION INTRAVENOUS
Status: DISCONTINUED | OUTPATIENT
Start: 2022-01-08 | End: 2022-01-11 | Stop reason: HOSPADM

## 2022-01-08 RX ORDER — MORPHINE SULFATE 4 MG/ML
4 INJECTION, SOLUTION INTRAMUSCULAR; INTRAVENOUS
Status: DISCONTINUED | OUTPATIENT
Start: 2022-01-08 | End: 2022-01-11 | Stop reason: HOSPADM

## 2022-01-08 RX ORDER — OXYTOCIN/RINGER'S LACTATE 30/500 ML
0-30 PLASTIC BAG, INJECTION (ML) INTRAVENOUS CONTINUOUS
Status: DISCONTINUED | OUTPATIENT
Start: 2022-01-08 | End: 2022-01-08

## 2022-01-08 RX ORDER — SODIUM CITRATE AND CITRIC ACID MONOHYDRATE 334; 500 MG/5ML; MG/5ML
30 SOLUTION ORAL ONCE AS NEEDED
Status: CANCELLED | OUTPATIENT
Start: 2022-01-08 | End: 2033-06-05

## 2022-01-08 RX ORDER — ONDANSETRON 2 MG/ML
4 INJECTION INTRAMUSCULAR; INTRAVENOUS EVERY 6 HOURS PRN
Status: CANCELLED | OUTPATIENT
Start: 2022-01-08

## 2022-01-08 RX ORDER — FENTANYL/ROPIVACAINE/NS/PF 2MCG/ML-.2
10 PLASTIC BAG, INJECTION (ML) INJECTION CONTINUOUS
Status: DISCONTINUED | OUTPATIENT
Start: 2022-01-08 | End: 2022-01-08

## 2022-01-08 RX ORDER — PROCHLORPERAZINE EDISYLATE 5 MG/ML
5 INJECTION INTRAMUSCULAR; INTRAVENOUS EVERY 6 HOURS PRN
Status: DISCONTINUED | OUTPATIENT
Start: 2022-01-08 | End: 2022-01-11 | Stop reason: HOSPADM

## 2022-01-08 RX ORDER — ACETAMINOPHEN 325 MG/1
650 TABLET ORAL EVERY 6 HOURS
Status: DISCONTINUED | OUTPATIENT
Start: 2022-01-08 | End: 2022-01-08

## 2022-01-08 RX ORDER — BISACODYL 10 MG
10 SUPPOSITORY, RECTAL RECTAL ONCE AS NEEDED
Status: DISCONTINUED | OUTPATIENT
Start: 2022-01-08 | End: 2022-01-11 | Stop reason: HOSPADM

## 2022-01-08 RX ORDER — EPHEDRINE SULFATE 50 MG/ML
10 INJECTION, SOLUTION INTRAVENOUS
Status: DISCONTINUED | OUTPATIENT
Start: 2022-01-08 | End: 2022-01-11 | Stop reason: HOSPADM

## 2022-01-08 RX ORDER — KETOROLAC TROMETHAMINE 30 MG/ML
60 INJECTION, SOLUTION INTRAMUSCULAR; INTRAVENOUS ONCE
Status: COMPLETED | OUTPATIENT
Start: 2022-01-08 | End: 2022-01-08

## 2022-01-08 RX ORDER — DIPHENHYDRAMINE HCL 25 MG
25 CAPSULE ORAL EVERY 4 HOURS PRN
Status: DISCONTINUED | OUTPATIENT
Start: 2022-01-08 | End: 2022-01-11 | Stop reason: HOSPADM

## 2022-01-08 RX ORDER — CALCIUM CARBONATE 200(500)MG
500 TABLET,CHEWABLE ORAL 3 TIMES DAILY PRN
Status: DISCONTINUED | OUTPATIENT
Start: 2022-01-08 | End: 2022-01-11 | Stop reason: HOSPADM

## 2022-01-08 RX ORDER — HYDROCODONE BITARTRATE AND ACETAMINOPHEN 7.5; 325 MG/1; MG/1
1 TABLET ORAL EVERY 4 HOURS PRN
Status: DISCONTINUED | OUTPATIENT
Start: 2022-01-08 | End: 2022-01-11 | Stop reason: HOSPADM

## 2022-01-08 RX ORDER — PROCHLORPERAZINE EDISYLATE 5 MG/ML
5 INJECTION INTRAMUSCULAR; INTRAVENOUS EVERY 6 HOURS PRN
Status: DISCONTINUED | OUTPATIENT
Start: 2022-01-08 | End: 2022-01-08

## 2022-01-08 RX ORDER — LIDOCAINE HYDROCHLORIDE AND EPINEPHRINE 15; 5 MG/ML; UG/ML
INJECTION, SOLUTION EPIDURAL
Status: DISCONTINUED | OUTPATIENT
Start: 2022-01-08 | End: 2022-01-08

## 2022-01-08 RX ORDER — SIMETHICONE 80 MG
1 TABLET,CHEWABLE ORAL 4 TIMES DAILY PRN
Status: DISCONTINUED | OUTPATIENT
Start: 2022-01-08 | End: 2022-01-08

## 2022-01-08 RX ORDER — CEFAZOLIN SODIUM 2 G/50ML
SOLUTION INTRAVENOUS
Status: DISPENSED
Start: 2022-01-08 | End: 2022-01-08

## 2022-01-08 RX ORDER — SODIUM CHLORIDE 9 MG/ML
INJECTION, SOLUTION INTRAVENOUS
Status: DISCONTINUED | OUTPATIENT
Start: 2022-01-08 | End: 2022-01-08

## 2022-01-08 RX ORDER — OXYTOCIN/RINGER'S LACTATE 30/500 ML
334 PLASTIC BAG, INJECTION (ML) INTRAVENOUS ONCE
Status: DISCONTINUED | OUTPATIENT
Start: 2022-01-08 | End: 2022-01-08

## 2022-01-08 RX ORDER — IBUPROFEN 800 MG/1
800 TABLET ORAL EVERY 8 HOURS PRN
Status: DISCONTINUED | OUTPATIENT
Start: 2022-01-08 | End: 2022-01-11 | Stop reason: HOSPADM

## 2022-01-08 RX ORDER — SIMETHICONE 80 MG
1 TABLET,CHEWABLE ORAL EVERY 6 HOURS PRN
Status: DISCONTINUED | OUTPATIENT
Start: 2022-01-08 | End: 2022-01-11 | Stop reason: HOSPADM

## 2022-01-08 RX ORDER — HYDROCORTISONE 25 MG/G
CREAM TOPICAL 3 TIMES DAILY PRN
Status: DISCONTINUED | OUTPATIENT
Start: 2022-01-08 | End: 2022-01-11 | Stop reason: HOSPADM

## 2022-01-08 RX ORDER — MISOPROSTOL 200 UG/1
800 TABLET ORAL
Status: DISCONTINUED | OUTPATIENT
Start: 2022-01-08 | End: 2022-01-11 | Stop reason: HOSPADM

## 2022-01-08 RX ADMIN — KETOROLAC TROMETHAMINE 60 MG: 30 INJECTION, SOLUTION INTRAMUSCULAR; INTRAVENOUS at 01:01

## 2022-01-08 RX ADMIN — EPHEDRINE SULFATE 5 MG: 50 INJECTION INTRAVENOUS at 10:01

## 2022-01-08 RX ADMIN — Medication 10 MILLI-UNITS/MIN: at 04:01

## 2022-01-08 RX ADMIN — SODIUM CHLORIDE, POTASSIUM CHLORIDE, SODIUM LACTATE AND CALCIUM CHLORIDE: 600; 310; 30; 20 INJECTION, SOLUTION INTRAVENOUS at 02:01

## 2022-01-08 RX ADMIN — SODIUM CHLORIDE, POTASSIUM CHLORIDE, SODIUM LACTATE AND CALCIUM CHLORIDE: 600; 310; 30; 20 INJECTION, SOLUTION INTRAVENOUS at 01:01

## 2022-01-08 RX ADMIN — Medication 16 MILLI-UNITS/MIN: at 06:01

## 2022-01-08 RX ADMIN — SODIUM CHLORIDE, POTASSIUM CHLORIDE, SODIUM LACTATE AND CALCIUM CHLORIDE: 600; 310; 30; 20 INJECTION, SOLUTION INTRAVENOUS at 09:01

## 2022-01-08 RX ADMIN — DOCUSATE SODIUM 200 MG: 100 CAPSULE ORAL at 08:01

## 2022-01-08 RX ADMIN — Medication 12 MILLI-UNITS/MIN: at 05:01

## 2022-01-08 RX ADMIN — KETOROLAC TROMETHAMINE 30 MG: 30 INJECTION, SOLUTION INTRAMUSCULAR; INTRAVENOUS at 08:01

## 2022-01-08 RX ADMIN — Medication 6 MILLI-UNITS/MIN: at 03:01

## 2022-01-08 RX ADMIN — SODIUM CHLORIDE, POTASSIUM CHLORIDE, SODIUM LACTATE AND CALCIUM CHLORIDE: 600; 310; 30; 20 INJECTION, SOLUTION INTRAVENOUS at 04:01

## 2022-01-08 RX ADMIN — SODIUM CHLORIDE, POTASSIUM CHLORIDE, SODIUM LACTATE AND CALCIUM CHLORIDE: 600; 310; 30; 20 INJECTION, SOLUTION INTRAVENOUS at 03:01

## 2022-01-08 RX ADMIN — OXYCODONE HYDROCHLORIDE 10 MG: 5 TABLET ORAL at 01:01

## 2022-01-08 RX ADMIN — LIDOCAINE HYDROCHLORIDE AND EPINEPHRINE 200 MG: 15; 5 INJECTION, SOLUTION EPIDURAL at 10:01

## 2022-01-08 RX ADMIN — OXYCODONE HYDROCHLORIDE 10 MG: 5 TABLET ORAL at 09:01

## 2022-01-08 RX ADMIN — ROPIVACAINE HYDROCHLORIDE 10 ML/HR: 10 INJECTION, SOLUTION EPIDURAL at 04:01

## 2022-01-08 RX ADMIN — EPHEDRINE SULFATE 10 MG: 50 INJECTION INTRAVENOUS at 10:01

## 2022-01-08 RX ADMIN — Medication 2 MILLI-UNITS/MIN: at 02:01

## 2022-01-08 NOTE — SUBJECTIVE & OBJECTIVE
"Obstetric HPI:  Patient reports Frequency: 3-4 times per hour contractions, active fetal movement, No vaginal bleeding , No loss of fluid     This pregnancy has been complicated by Gestational hypertension- gestational diabetes- Insulin controlled    OB History    Para Term  AB Living   2 1 1 0 0 1   SAB IAB Ectopic Multiple Live Births   0 0 0 0 1      # Outcome Date GA Lbr Joshua/2nd Weight Sex Delivery Anes PTL Lv   2 Current            1 Term 17 39w0d  3.402 kg (7 lb 8 oz) F Vag-Spont EPI  JOSE     Past Medical History:   Diagnosis Date    Gestational diabetes mellitus (GDM) in third trimester 11/15/2021    Gestational hypertension, third trimester 11/15/2021    Herpes simplex virus (HSV) infection      History reviewed. No pertinent surgical history.    PTA Medications   Medication Sig    blood sugar diagnostic Strp 1 strip by Misc.(Non-Drug; Combo Route) route 4 (four) times daily.    blood-glucose meter Misc 1 Units by Misc.(Non-Drug; Combo Route) route every 4 (four) months.    insulin NPH (HUMULIN N NPH U-100 INSULIN) 100 unit/mL injection Take 16 units q a.m. and take 6 units q p.m.    insulin regular 100 unit/mL Inj injection Take 8 units q p.m. and take 6 units qp.m.    insulin syringe-needle U-100 0.3 mL 31 gauge x 15/64" Syrg 1 Units by Misc.(Non-Drug; Combo Route) route 4 (four) times daily.    labetaloL (NORMODYNE) 100 MG tablet Take 1 tablet (100 mg total) by mouth 2 (two) times daily.    lancets (ACCU-CHEK SOFTCLIX LANCETS) Misc 1 lancet by Misc.(Non-Drug; Combo Route) route 4 (four) times daily.    mvn-min75-iron-iron ps-om3-dha (CONCEPT DHA) 35-1-200 mg Cap Take 1 capsule by mouth once daily.    valACYclovir (VALTREX) 1000 MG tablet Take 1 tablet (1,000 mg total) by mouth once daily.       Review of patient's allergies indicates:  No Known Allergies     Family History     Problem Relation (Age of Onset)    Diabetes Mother    Hypertension Father, Mother, Brother    No " Known Problems Brother, Daughter        Tobacco Use    Smoking status: Former Smoker    Smokeless tobacco: Never Used   Substance and Sexual Activity    Alcohol use: Not Currently    Drug use: Never    Sexual activity: Yes     Partners: Male     Birth control/protection: None     Review of Systems   Constitutional: Negative for activity change, appetite change, fatigue and unexpected weight change.   HENT: Negative.    Respiratory: Negative for cough, shortness of breath and wheezing.    Cardiovascular: Negative for chest pain, palpitations and leg swelling.   Gastrointestinal: Negative for abdominal pain, bloating, blood in stool, constipation, diarrhea, nausea, vomiting and reflux.   Genitourinary: Negative for bladder incontinence, decreased libido, dysmenorrhea, dyspareunia, dysuria, flank pain, frequency, menorrhagia, menstrual problem, pelvic pain, urgency, vaginal bleeding, vaginal discharge, postcoital bleeding and vaginal odor.   Musculoskeletal: Negative for back pain.   Integumentary:  Negative for rash, acne, hair changes, mole/lesion, breast mass, nipple discharge, breast skin changes and breast tenderness.   Neurological: Negative for headaches.   Psychiatric/Behavioral: Negative for depression and sleep disturbance. The patient is not nervous/anxious.    Breast: Negative for asymmetry, breast self exam, lump, mass, nipple discharge, skin changes and tenderness     Objective:     Vital Signs (Most Recent):  Temp: 98.6 °F (37 °C) (01/08/22 1032)  Pulse: 85 (01/08/22 1032)  Resp: 16 (01/08/22 1032)  BP: 125/75 (01/08/22 1032)  SpO2: 100 % (01/08/22 1032) Vital Signs (24h Range):  Temp:  [97.9 °F (36.6 °C)-98.6 °F (37 °C)] 98.6 °F (37 °C)  Pulse:  [] 85  Resp:  [16-18] 16  SpO2:  [98 %-100 %] 100 %  BP: (122-132)/(68-79) 125/75        There is no height or weight on file to calculate BMI.    FHT: 150sCat 1 (reassuring)  TOCO:  Q 3-4 minutes    Physical Exam:   Constitutional: She is oriented to  person, place, and time. She appears well-developed and well-nourished.    HENT:   Head: Normocephalic and atraumatic.    Eyes: Pupils are equal, round, and reactive to light.     Cardiovascular: Normal rate, regular rhythm, normal heart sounds and intact distal pulses.  Exam reveals no clubbing, no cyanosis and no edema.     Pulmonary/Chest: Effort normal and breath sounds normal.        Abdominal: Soft. Bowel sounds are normal.     Genitourinary:    Vagina and uterus normal.             Musculoskeletal: Normal range of motion and moves all extremeties. No edema.       Neurological: She is alert and oriented to person, place, and time.    Skin: Skin is warm and dry. No cyanosis. Nails show no clubbing.    Psychiatric: She has a normal mood and affect. Her behavior is normal. Judgment and thought content normal.       Cervix:  Dilation:  6  Effacement:  75%  Station: -3  Presentation: Vertex     Significant Labs:  Lab Results   Component Value Date    GROUPTRH AB POS 01/08/2022    HEPBSAG Non-Reactive 06/14/2021       I have personallly reviewed all pertinent lab results from the last 24 hours.  Recent Lab Results       01/08/22  0944   01/08/22  0653   01/08/22  0535   01/08/22  0310   01/08/22  0308        Albumin/Globulin Ratio       0.6         Albumin       2.9         Alkaline Phosphatase       131         ALT       25         Anion Gap       16         Appearance, UA               AST       17         Baso #       0.02         Basophil %       0.3         Bilirubin (UA)               BILIRUBIN TOTAL       0.4         BUN       10         BUN/CREAT RATIO       18         Calcium       8.8         Chloride       103         CO2       23         Color, UA               Creatinine       0.57         Crenated RBC's       Few         Differential Type       Scan Smear         eGFR if non        140         Eos #       0.05         Eosinophil %       0.7         Globulin, Total       4.6          Glucose       72         Glucose, UA               Group & Rh       AB POS         Hematocrit       33.1         Hemoglobin       10.8         Hypo       1+         Immature Grans (Abs)       0.04         Immature Granulocytes       0.5         INDIRECT WILLIE       NEG         Ketones, UA               Leukocytes, UA               Lymph #       1.65         Lymph %       21.8         MCH       24.1         MCHC       32.6         MCV       73.7         Microcytes       1+         Mono #       0.58         Mono %       7.7         MPV       10.9         Neutrophils, Abs       5.22         Neutrophils Relative       69.0         NITRITE UA               nRBC       0.0         NUCLEATED RBC ABSOLUTE       0.00         Occult Blood UA               Ovalocytes       Few         pH, UA               PLATELET MORPHOLOGY       Few Large Platelets         Platelets       210         POC Glucose 72   88   88     76       Poly       Few         Potassium       4.0         PROTEIN TOTAL       7.5         Protein, UA               RBC       4.49         RDW       14.4         Sodium       138         Specific Bonneau, UA               Syphilis Ab Interpretation       Non-Reactive  Comment: 0.0 - 0.9: Non-Reactive  0.91 - 1.10: Equivocal with RPR to follow  >1.10:  Reactive with RPR to Follow         UROBILINOGEN UA               WBC       7.56                          01/08/22  0225   01/08/22  0111        Albumin/Globulin Ratio         Albumin         Alkaline Phosphatase         ALT         Anion Gap         Appearance, UA Clear         AST         Baso #         Basophil %         Bilirubin (UA) Negative         BILIRUBIN TOTAL         BUN         BUN/CREAT RATIO         Calcium         Chloride         CO2         Color, UA Yellow         Creatinine         Crenated RBC's         Differential Type         eGFR if non          Eos #         Eosinophil %         Globulin, Total         Glucose         Glucose,  UA Negative         Group & Rh         Hematocrit         Hemoglobin         Hypo         Immature Grans (Abs)         Immature Granulocytes         INDIRECT WILLIE         Ketones, UA Negative         Leukocytes, UA Negative         Lymph #         Lymph %         MCH         MCHC         MCV         Microcytes         Mono #         Mono %         MPV         Neutrophils, Abs         Neutrophils Relative         NITRITE UA Negative         nRBC         NUCLEATED RBC ABSOLUTE         Occult Blood UA Negative         Ovalocytes         pH, UA 6.0         PLATELET MORPHOLOGY         Platelets         POC Glucose   96       Poly         Potassium         PROTEIN TOTAL         Protein, UA Negative         RBC         RDW         Sodium         Specific Gravity, UA >=1.030         Syphilis Ab Interpretation         UROBILINOGEN UA 0.2         WBC

## 2022-01-08 NOTE — PLAN OF CARE
Problem: Diabetes in Pregnancy  Goal: Blood Glucose Level Within Targeted Range  Outcome: Ongoing, Progressing     Problem: Adult Inpatient Plan of Care  Goal: Plan of Care Review  Outcome: Ongoing, Progressing  Goal: Patient-Specific Goal (Individualized)  Outcome: Ongoing, Progressing  Goal: Absence of Hospital-Acquired Illness or Injury  Outcome: Ongoing, Progressing  Goal: Optimal Comfort and Wellbeing  Outcome: Ongoing, Progressing  Goal: Readiness for Transition of Care  Outcome: Ongoing, Progressing     Problem:  Fall Injury Risk  Goal: Absence of Fall, Infant Drop and Related Injury  Outcome: Ongoing, Progressing     Problem: Bleeding (Labor)  Goal: Hemostasis  Outcome: Ongoing, Progressing     Problem: Change in Fetal Wellbeing (Labor)  Goal: Stable Fetal Wellbeing  Outcome: Ongoing, Progressing     Problem: Delayed Labor Progression (Labor)  Goal: Effective Progression to Delivery  Outcome: Ongoing, Progressing     Problem: Infection (Labor)  Goal: Absence of Infection Signs and Symptoms  Outcome: Ongoing, Progressing     Problem: Labor Pain (Labor)  Goal: Acceptable Pain Control  Outcome: Ongoing, Progressing     Problem: Uterine Tachysystole (Labor)  Goal: Normal Uterine Contraction Pattern  Outcome: Ongoing, Progressing

## 2022-01-08 NOTE — ANESTHESIA PROCEDURE NOTES
Epidural    Patient location during procedure: OB   Reason for block: primary anesthetic   Reason for block: labor analgesia requested by patient and obstetrician  Diagnosis: IUP   Start time: 1/8/2022 4:56 AM  Timeout: 1/8/2022 4:56 AM  End time: 1/8/2022 4:56 AM    Staffing  Performing Provider: Jas Alcaraz MD  Authorizing Provider: Jas Alcaraz MD        Preanesthetic Checklist  Completed: patient identified, IV checked, site marked, risks and benefits discussed, surgical consent, monitors and equipment checked, pre-op evaluation, timeout performed, anesthesia consent given, hand hygiene performed and patient being monitored  Preparation  Patient position: sitting  Prep: Betadine  Patient monitoring: ECG, Pulse Ox, continuous capnometry and Blood Pressure  Reason for block: primary anesthetic   Epidural  Skin Anesthetic: lidocaine 1%  Administration type: continuous  Approach: midline  Interspace: L3-4    Injection technique: NEAL saline  Needle and Epidural Catheter  Needle type: Tuohy   Needle gauge: 18  Needle length: 3.5 inches  Catheter type: end hole  Insertion Attempts: 1  Test dose: 3 mL of lidocaine 1.5% with Epi 1-to-200,000  Additional Documentation: incremental injection  Needle localization: anatomical landmarks  Assessment  Ease of block: easy  Patient's tolerance of the procedure: comfortable throughout blockNo inadvertent dural puncture with Tuohy.  Dural puncture not performed with spinal needle

## 2022-01-08 NOTE — ANESTHESIA PREPROCEDURE EVALUATION
01/08/2022  Alida Tompkins is a 21 y.o., female.    Anesthesia Evaluation    I have reviewed the Patient Summary Reports.    I have reviewed the Nursing Notes. I have reviewed the NPO Status.   I have reviewed the Medications.     Review of Systems  Anesthesia Hx:  No problems with previous Anesthesia    Social:  Non-Smoker, No Alcohol Use    Hematology/Oncology:  Hematology Normal   Oncology Normal     EENT/Dental:EENT/Dental Normal   Cardiovascular:   Hypertension    Pulmonary:  Pulmonary Normal    Renal/:  Renal/ Normal     Hepatic/GI:  Hepatic/GI Normal    Musculoskeletal:  Musculoskeletal Normal    OB/GYN/PEDS:  IUP   Neurological:  Neurology Normal    Endocrine:   Diabetes    Dermatological:  Skin Normal    Psych:  Psychiatric Normal           Physical Exam  General:  Well nourished    Airway/Jaw/Neck:  Airway Findings: Mouth Opening: Normal Mallampati: II     Eyes/Ears/Nose:  Eyes/Ears/Nose Findings:     Chest/Lungs:  Chest/Lungs Findings:        Mental Status:  Mental Status Findings:  Cooperative, Alert and Oriented         Chemistry        Component Value Date/Time     01/08/2022 0310    K 4.0 01/08/2022 0310     01/08/2022 0310    CO2 23 01/08/2022 0310    BUN 10 01/08/2022 0310    CREATININE 0.57 01/08/2022 0310    GLU 72 (L) 01/08/2022 0310        Component Value Date/Time    CALCIUM 8.8 01/08/2022 0310    ALKPHOS 131 (H) 01/08/2022 0310    AST 17 01/08/2022 0310    ALT 25 01/08/2022 0310    BILITOT 0.4 01/08/2022 0310    EGFRNONAA 140 01/08/2022 0310        Lab Results   Component Value Date    WBC 7.56 01/08/2022    RBC 4.49 01/08/2022    HGB 10.8 (L) 01/08/2022    MCV 73.7 (L) 01/08/2022    MCH 24.1 (L) 01/08/2022    MCHC 32.6 01/08/2022    RDW 14.4 01/08/2022     01/08/2022    MPV 10.9 01/08/2022    LYMPH 21.8 (L) 01/08/2022    LYMPH 1.65 01/08/2022    MONO 7.7 (H)  01/08/2022    EOS 0.05 01/08/2022    BASO 0.02 01/08/2022         Anesthesia Plan  Type of Anesthesia, risks & benefits discussed:  Anesthesia Type:  epidural    Patient's Preference:   Plan Factors:          Intra-op Monitoring Plan: standard ASA monitors  Intra-op Monitoring Plan Comments:   Post Op Pain Control Plan: epidural analgesia  Post Op Pain Control Plan Comments:     Induction:   IV  Beta Blocker:  Patient is not currently on a Beta-Blocker (No further documentation required).       Informed Consent: Patient understands risks and agrees with Anesthesia plan.  Questions answered. Anesthesia consent signed with patient.  ASA Score: 3     Day of Surgery Review of History & Physical: I have interviewed and examined the patient. I have reviewed the patient's H&P dated:  There are no significant changes.          Ready For Surgery From Anesthesia Perspective.

## 2022-01-08 NOTE — H&P
"[facility] -  Labor and Delivery  Obstetrics  History & Physical    Patient Name: Alida Tompkins  MRN: [id number]  Admission Date: 2022  Primary Care Provider: Michelle Cuadra M.D.    Subjective:     Principal Problem:IUP @ 39 weeks  History of Present Illness:  This is a 21-year-old  2 para 1001 admitted at 39 weeks 0 day for induction of labor at bedtime.  Prenatal care with Dr. Fountain complicated by gestational diabetes and gestational hypertension.  Patient has yourInsulin controlled with regard to her diabetes.  Was placed on labetalol 100 mg p.o. b.i.d. await blood pressure is stable.  She is GBS negative.      Obstetric HPI:  Patient reports Frequency: 3-4 times per hour contractions, active fetal movement, No vaginal bleeding , No loss of fluid     This pregnancy has been complicated by Gestational hypertension- gestational diabetes- Insulin controlled    OB History    Para Term  AB Living   2 1 1 0 0 1   SAB IAB Ectopic Multiple Live Births   0 0 0 0 1      # Outcome Date GA Lbr Joshua/2nd Weight Sex Delivery Anes PTL Lv   2 Current            1 Term 17 39w0d  3.402 kg (7 lb 8 oz) F Vag-Spont EPI  JOSE     Past Medical History:   Diagnosis Date    Gestational diabetes mellitus (GDM) in third trimester 11/15/2021    Gestational hypertension, third trimester 11/15/2021    Herpes simplex virus (HSV) infection      History reviewed. No pertinent surgical history.    PTA Medications   Medication Sig    blood sugar diagnostic Strp 1 strip by Misc.(Non-Drug; Combo Route) route 4 (four) times daily.    blood-glucose meter Misc 1 Units by Misc.(Non-Drug; Combo Route) route every 4 (four) months.    insulin NPH (HUMULIN N NPH U-100 INSULIN) 100 unit/mL injection Take 16 units q a.m. and take 6 units q p.m.    insulin regular 100 unit/mL Inj injection Take 8 units q p.m. and take 6 units qp.m.    insulin syringe-needle U-100 0.3 mL 31 gauge x 15/64" Syrg 1 Units by Misc.(Non-Drug; " Combo Route) route 4 (four) times daily.    labetaloL (NORMODYNE) 100 MG tablet Take 1 tablet (100 mg total) by mouth 2 (two) times daily.    lancets (ACCU-CHEK SOFTCLIX LANCETS) Misc 1 lancet by Misc.(Non-Drug; Combo Route) route 4 (four) times daily.    mvn-min75-iron-iron ps-om3-dha (CONCEPT DHA) 35-1-200 mg Cap Take 1 capsule by mouth once daily.    valACYclovir (VALTREX) 1000 MG tablet Take 1 tablet (1,000 mg total) by mouth once daily.       Review of patient's allergies indicates:  No Known Allergies     Family History     Problem Relation (Age of Onset)    Diabetes Mother    Hypertension Father, Mother, Brother    No Known Problems Brother, Daughter        Tobacco Use    Smoking status: Former Smoker    Smokeless tobacco: Never Used   Substance and Sexual Activity    Alcohol use: Not Currently    Drug use: Never    Sexual activity: Yes     Partners: Male     Birth control/protection: None     Review of Systems   Constitutional: Negative for activity change, appetite change, fatigue and unexpected weight change.   HENT: Negative.    Respiratory: Negative for cough, shortness of breath and wheezing.    Cardiovascular: Negative for chest pain, palpitations and leg swelling.   Gastrointestinal: Negative for abdominal pain, bloating, blood in stool, constipation, diarrhea, nausea, vomiting and reflux.   Genitourinary: Negative for bladder incontinence, decreased libido, dysmenorrhea, dyspareunia, dysuria, flank pain, frequency, menorrhagia, menstrual problem, pelvic pain, urgency, vaginal bleeding, vaginal discharge, postcoital bleeding and vaginal odor.   Musculoskeletal: Negative for back pain.   Integumentary:  Negative for rash, acne, hair changes, mole/lesion, breast mass, nipple discharge, breast skin changes and breast tenderness.   Neurological: Negative for headaches.   Psychiatric/Behavioral: Negative for depression and sleep disturbance. The patient is not nervous/anxious.    Breast: Negative  for asymmetry, breast self exam, lump, mass, nipple discharge, skin changes and tenderness     Objective:     Vital Signs (Most Recent):  Temp: 98.6 °F (37 °C) (01/08/22 1032)  Pulse: 85 (01/08/22 1032)  Resp: 16 (01/08/22 1032)  BP: 125/75 (01/08/22 1032)  SpO2: 100 % (01/08/22 1032) Vital Signs (24h Range):  Temp:  [97.9 °F (36.6 °C)-98.6 °F (37 °C)] 98.6 °F (37 °C)  Pulse:  [] 85  Resp:  [16-18] 16  SpO2:  [98 %-100 %] 100 %  BP: (122-132)/(68-79) 125/75        There is no height or weight on file to calculate BMI.    FHT: 150sCat 1 (reassuring)  TOCO:  Q 3-4 minutes    Physical Exam:   Constitutional: She is oriented to person, place, and time. She appears well-developed and well-nourished.    HENT:   Head: Normocephalic and atraumatic.    Eyes: Pupils are equal, round, and reactive to light.     Cardiovascular: Normal rate, regular rhythm, normal heart sounds and intact distal pulses.  Exam reveals no clubbing, no cyanosis and no edema.     Pulmonary/Chest: Effort normal and breath sounds normal.        Abdominal: Soft. Bowel sounds are normal.     Genitourinary:    Vagina and uterus normal.             Musculoskeletal: Normal range of motion and moves all extremeties. No edema.       Neurological: She is alert and oriented to person, place, and time.    Skin: Skin is warm and dry. No cyanosis. Nails show no clubbing.    Psychiatric: She has a normal mood and affect. Her behavior is normal. Judgment and thought content normal.       Cervix:  Dilation:  6  Effacement:  75%  Station: -3  Presentation: Vertex     Significant Labs:  Lab Results   Component Value Date    GROUPTRH AB POS 01/08/2022    HEPBSAG Non-Reactive 06/14/2021       I have personallly reviewed all pertinent lab results from the last 24 hours.  Recent Lab Results       01/08/22  0944   01/08/22  0653   01/08/22  0535   01/08/22  0310   01/08/22  0308        Albumin/Globulin Ratio       0.6         Albumin       2.9         Alkaline  Phosphatase       131         ALT       25         Anion Gap       16         Appearance, UA               AST       17         Baso #       0.02         Basophil %       0.3         Bilirubin (UA)               BILIRUBIN TOTAL       0.4         BUN       10         BUN/CREAT RATIO       18         Calcium       8.8         Chloride       103         CO2       23         Color, UA               Creatinine       0.57         Crenated RBC's       Few         Differential Type       Scan Smear         eGFR if non        140         Eos #       0.05         Eosinophil %       0.7         Globulin, Total       4.6         Glucose       72         Glucose, UA               Group & Rh       AB POS         Hematocrit       33.1         Hemoglobin       10.8         Hypo       1+         Immature Grans (Abs)       0.04         Immature Granulocytes       0.5         INDIRECT WILLIE       NEG         Ketones, UA               Leukocytes, UA               Lymph #       1.65         Lymph %       21.8         MCH       24.1         MCHC       32.6         MCV       73.7         Microcytes       1+         Mono #       0.58         Mono %       7.7         MPV       10.9         Neutrophils, Abs       5.22         Neutrophils Relative       69.0         NITRITE UA               nRBC       0.0         NUCLEATED RBC ABSOLUTE       0.00         Occult Blood UA               Ovalocytes       Few         pH, UA               PLATELET MORPHOLOGY       Few Large Platelets         Platelets       210         POC Glucose 72   88   88     76       Poly       Few         Potassium       4.0         PROTEIN TOTAL       7.5         Protein, UA               RBC       4.49         RDW       14.4         Sodium       138         Specific Bixby, UA               Syphilis Ab Interpretation       Non-Reactive  Comment: 0.0 - 0.9: Non-Reactive  0.91 - 1.10: Equivocal with RPR to follow  >1.10:  Reactive with RPR to Follow          UROBILINOGEN UA               WBC       7.56                          22  0225   22  0111        Albumin/Globulin Ratio         Albumin         Alkaline Phosphatase         ALT         Anion Gap         Appearance, UA Clear         AST         Baso #         Basophil %         Bilirubin (UA) Negative         BILIRUBIN TOTAL         BUN         BUN/CREAT RATIO         Calcium         Chloride         CO2         Color, UA Yellow         Creatinine         Crenated RBC's         Differential Type         eGFR if non          Eos #         Eosinophil %         Globulin, Total         Glucose         Glucose, UA Negative         Group & Rh         Hematocrit         Hemoglobin         Hypo         Immature Grans (Abs)         Immature Granulocytes         INDIRECT WILLIE         Ketones, UA Negative         Leukocytes, UA Negative         Lymph #         Lymph %         MCH         MCHC         MCV         Microcytes         Mono #         Mono %         MPV         Neutrophils, Abs         Neutrophils Relative         NITRITE UA Negative         nRBC         NUCLEATED RBC ABSOLUTE         Occult Blood UA Negative         Ovalocytes         pH, UA 6.0         PLATELET MORPHOLOGY         Platelets         POC Glucose   96       Poly         Potassium         PROTEIN TOTAL         Protein, UA Negative         RBC         RDW         Sodium         Specific Gravity, UA >=1.030         Syphilis Ab Interpretation         UROBILINOGEN UA 0.2         WBC             Assessment/Plan:     21 y.o. female  at 39w0d for:    39 weeks gestation of pregnancy  Pt admitted to L&D for induction of labor    Gestational hypertension, third trimester  Monitor BP- Continue po Labetalol    Gestational diabetes mellitus (GDM) in third trimester  Q 2 hour glucose checks        Michelle Cuadra MD  Obstetrics  [facility] [place] -  Labor and Delivery

## 2022-01-08 NOTE — L&D DELIVERY NOTE
[facility] -  Labor and Delivery   Section   Operative Note    SUMMARY     Date of Procedure: 2022     Procedure: Procedure(s) (LRB):   SECTION (N/A)    Surgeon(s) and Role:     * Michelle Cuadra MD - Primary    Assisting Surgeon: None    Pre-Operative Diagnosis: Pregnant [Z34.90]  Non-reassurring fetal heart tracing  Gestational diabetes  Gestational hypertension  Post-Operative Diagnosis: Post-Op Diagnosis Codes:     * Pregnant [Z34.90]  same  Anesthesia: Spinal/Epidural    Technical Procedures Used: primary LTCS           Description of the Findings of the Procedure: liveborn male     Significant Surgical Tasks Conducted by the Assistant(s), if Applicable: Carmencita Pérez CNM    Complications: No    Blood Loss:  250 ml  With patient in supine position, the legs are  and Pascual Catheter placed and positioning to supine done.   Abdomen prepped with Chloroprep and 3 minute drying time allowed prior to draping of the abdomen.   Time out taken with OR team members.  Following informed consent the patient was taken to the operating room where spinal anesthesia was administered without difficulty. She was prepped and draped in the usual sterile fashion and placed in the dorsal lithotomy position. A Pfannenstiel skin incision was made with the scalpel and the underlying layer of fascia, entered with the Bovie. The fascial incision was extended laterally. The inferior and superior aspects of the fascial incision were grasped with Mark clamps, elevated and the rectus muscles dissected off bluntly. The rectus muscles were then  in the midline and the peritoneum identified and entered with Metzenbaum scissors. This was then extended superiorly and inferiorly with good visualization of the bladder. The bladder blade was inserted and the vesicouterine peritoneum identified and entered with the Metzenbaum scissors. This incision extended superiorly and inferiorly and a bladder  flap created digitally. The bladder blade was then reinserted and the uterus incised in a transverse fashion with the scalpel. This was extended laterally. The baby's head was delivered atraumatically. The nose and mouth were bulb suctioned. The cord was clamped and cut infant the infant was handed off to the waiting nurses. Cord blood was sent. Placenta was then removed manually and the uterus cleared of all clots and debris  The uterine incision was repaired with #1 Vicryl in a running locked fashion. A second layer of the same suture was used to obtain hemostasis. The gutters when cleared of all clots and debris and once again hemostasis found be satisfactory.  All instruments were then removed from the abdomen. The fascial incision per 0 Vicryl in a running fashion. Skin was closed with interrupted staples. At the end procedure all sponge lap needle sponge counts correct ×2. Patient taken cover awake and stable condition.                 Specimens:   Specimen (24h ago, onward)            None          Condition: Good    Disposition: PACU - hemodynamically stable.    Attestation: Good         Delivery Information for Otilio Tompkins    Birth information:  YOB: 2024   Time of birth: 10:10 AM   Sex: male   Head Delivery Date/Time: 2022 10:10 AM   Delivery type: , Low Transverse   Gestational Age: 39w0d    Delivery Providers    Delivering clinician: Michelle Cuadra MD           Measurements    Weight: 3835 g  Weight (lbs): 8 lb 7.3 oz  Length:          Apgars    Living status: Living  Apgars:  1 min.:  5 min.:  10 min.:  15 min.:  20 min.:    Skin color:  1  1       Heart rate:  2  2       Reflex irritability:  2  2       Muscle tone:  2  2       Respiratory effort:  2  2       Total:  9  9              Operative Delivery    Forceps attempted?: No  Vacuum extractor attempted?: No         Shoulder Dystocia    Shoulder dystocia present?: No           Presentation    Presentation:  Vertex  Position: Left Occiput Anterior           Interventions/Resuscitation    Method: Bulb Suctioning       Cord    Delayed Cord Clamping?: No  Cord Blood Disposition: Lab  Gases Sent?: Yes       Placenta    Placenta delivery date/time: 2022 1012  Placenta removal: Manual removal  Placenta appearance: Intact  Placenta disposition: discarded           Labor Events:       labor:       Labor Onset Date/Time: 2022 02:31     Dilation Complete Date/Time:         Start Pushing Date/Time:       Rupture Date/Time:            Rupture type:          Fluid Amount:       Fluid Color:        steroids:       Antibiotics given for GBS:       Induction: oxytocin     Indications for induction:  Elective     Augmentation:       Indications for augmentation:       Labor complications: Fetal Intolerance     Additional complications:          Cervical ripening:                     Delivery:      Episiotomy:       Indication for Episiotomy:       Perineal Lacerations:   Repaired:      Periurethral Laceration:   Repaired:     Labial Laceration:   Repaired:     Sulcus Laceration:   Repaired:     Vaginal Laceration:   Repaired:     Cervical Laceration:   Repaired:     Repair suture:       Repair # of packets:       Last Value - EBL - Nursing (mL):       Sum - EBL - Nursing (mL): 0     Last Value - EBL - Anesthesia (mL):      Calculated QBL (mL):       Vaginal Sweep Performed:       Surgicount Correct:         Other providers:       Anesthesia    Method: Epidural          Details (if applicable):  Trial of Labor Yes    Categorization: Primary    Priority: Emergency   Indications for : Fetal Intolerance of Labor   Incision Type: low transverse     Additional  information:  Forceps:    Vacuum:    Breech:    Observed anomalies    Other (Comments):

## 2022-01-08 NOTE — HPI
This is a 21-year-old  2 para 1001 admitted at 39 weeks 0 day for induction of labor at bedtime.  Prenatal care with Dr. Fountain complicated by gestational diabetes and gestational hypertension.  Patient has yourInsulin controlled with regard to her diabetes.  Was placed on labetalol 100 mg p.o. b.i.d. await blood pressure is stable.  She is GBS negative.

## 2022-01-08 NOTE — TRANSFER OF CARE
Anesthesia Transfer of Care Note    Patient: Alida Tompkins    Procedure(s) Performed: Procedure(s) (LRB):   SECTION (N/A)    Patient location: PACU    Anesthesia Type: epidural    Transport from OR: Transported from OR on room air with adequate spontaneous ventilation    Post pain: adequate analgesia    Post assessment: no apparent anesthetic complications    Post vital signs: stable    Level of consciousness: awake    Complications: none    Transfer of care protocol was followed      Last vitals:   Visit Vitals  /75 (BP Location: Right arm, Patient Position: Lying)   Pulse 85   Temp 37 °C (98.6 °F)   Resp 16   LMP 2021 (Exact Date)   SpO2 100%

## 2022-01-09 PROBLEM — Z98.891 S/P CESAREAN SECTION: Status: ACTIVE | Noted: 2022-01-09

## 2022-01-09 LAB
BASOPHILS # BLD AUTO: 0.02 K/UL (ref 0–0.2)
BASOPHILS NFR BLD AUTO: 0.2 % (ref 0–1)
DACRYOCYTES BLD QL SMEAR: ABNORMAL
DIFFERENTIAL METHOD BLD: ABNORMAL
EOSINOPHIL # BLD AUTO: 0.04 K/UL (ref 0–0.5)
EOSINOPHIL NFR BLD AUTO: 0.4 % (ref 1–4)
ERYTHROCYTE [DISTWIDTH] IN BLOOD BY AUTOMATED COUNT: 14.4 % (ref 11.5–14.5)
HCT VFR BLD AUTO: 29 % (ref 38–47)
HGB BLD-MCNC: 9.3 G/DL (ref 12–16)
IMM GRANULOCYTES # BLD AUTO: 0.05 K/UL (ref 0–0.04)
IMM GRANULOCYTES NFR BLD: 0.6 % (ref 0–0.4)
LYMPHOCYTES # BLD AUTO: 1.09 K/UL (ref 1–4.8)
LYMPHOCYTES NFR BLD AUTO: 12.1 % (ref 27–41)
MCH RBC QN AUTO: 23.5 PG (ref 27–31)
MCHC RBC AUTO-ENTMCNC: 32.1 G/DL (ref 32–36)
MCV RBC AUTO: 73.4 FL (ref 80–96)
MICROCYTES BLD QL SMEAR: ABNORMAL
MONOCYTES # BLD AUTO: 0.61 K/UL (ref 0–0.8)
MONOCYTES NFR BLD AUTO: 6.8 % (ref 2–6)
MPC BLD CALC-MCNC: 10.6 FL (ref 9.4–12.4)
NEUTROPHILS # BLD AUTO: 7.22 K/UL (ref 1.8–7.7)
NEUTROPHILS NFR BLD AUTO: 79.9 % (ref 53–65)
NRBC # BLD AUTO: 0 X10E3/UL
NRBC, AUTO (.00): 0 %
OVALOCYTES BLD QL SMEAR: ABNORMAL
PLATELET # BLD AUTO: 190 K/UL (ref 150–400)
PLATELET MORPHOLOGY: ABNORMAL
POLYCHROMASIA BLD QL SMEAR: ABNORMAL
RBC # BLD AUTO: 3.95 M/UL (ref 4.2–5.4)
WBC # BLD AUTO: 9.03 K/UL (ref 4.5–11)

## 2022-01-09 PROCEDURE — 85025 COMPLETE CBC W/AUTO DIFF WBC: CPT | Performed by: OBSTETRICS & GYNECOLOGY

## 2022-01-09 PROCEDURE — 63600175 PHARM REV CODE 636 W HCPCS: Performed by: OBSTETRICS & GYNECOLOGY

## 2022-01-09 PROCEDURE — 11000001 HC ACUTE MED/SURG PRIVATE ROOM

## 2022-01-09 PROCEDURE — 25000003 PHARM REV CODE 250: Performed by: ANESTHESIOLOGY

## 2022-01-09 PROCEDURE — 25000003 PHARM REV CODE 250: Performed by: OBSTETRICS & GYNECOLOGY

## 2022-01-09 PROCEDURE — 36415 COLL VENOUS BLD VENIPUNCTURE: CPT | Performed by: OBSTETRICS & GYNECOLOGY

## 2022-01-09 RX ADMIN — HYDROCODONE BITARTRATE AND ACETAMINOPHEN 1 TABLET: 7.5; 325 TABLET ORAL at 09:01

## 2022-01-09 RX ADMIN — DOCUSATE SODIUM 200 MG: 100 CAPSULE ORAL at 08:01

## 2022-01-09 RX ADMIN — HYDROCODONE BITARTRATE AND ACETAMINOPHEN 1 TABLET: 7.5; 325 TABLET ORAL at 08:01

## 2022-01-09 RX ADMIN — OXYCODONE HYDROCHLORIDE 10 MG: 5 TABLET ORAL at 05:01

## 2022-01-09 RX ADMIN — KETOROLAC TROMETHAMINE 30 MG: 30 INJECTION, SOLUTION INTRAMUSCULAR; INTRAVENOUS at 12:01

## 2022-01-09 RX ADMIN — DOCUSATE SODIUM 200 MG: 100 CAPSULE ORAL at 09:01

## 2022-01-09 RX ADMIN — IBUPROFEN 800 MG: 800 TABLET ORAL at 06:01

## 2022-01-09 RX ADMIN — Medication 1 TABLET: at 09:01

## 2022-01-09 RX ADMIN — KETOROLAC TROMETHAMINE 30 MG: 30 INJECTION, SOLUTION INTRAMUSCULAR; INTRAVENOUS at 06:01

## 2022-01-09 NOTE — PROGRESS NOTES
[facility] -  Labor and Delivery  Obstetrics  Postpartum Progress Note    Patient Name: Alida Tompkins  MRN: [id number]  Admission Date: 1/8/2022  Hospital Length of Stay: 1 days  Attending Physician: Michelle Cuadra MD  Primary Care Provider: Primary Doctor No    Subjective:     Principal Problem:39 weeks gestation of pregnancy    Hospital Course:  No notes on file    Interval History:  Patient is comfortable without complaint.  She is both breast and bottle feeding.  She is ambulating without difficulty  She is doing well this morning. She is tolerating a regular diet without nausea or vomiting. She is voiding spontaneously. She is ambulating. She has passed flatus, and has not a BM. Vaginal bleeding is mild. She denies fever or chills. Abdominal pain is moderate and controlled with oral medications. She Is breastfeeding. She desires circumcision for her male baby: yes.    Objective:     Vital Signs (Most Recent):  Temp: 98.7 °F (37.1 °C) (01/09/22 0749)  Pulse: 100 (01/09/22 0749)  Resp: 18 (01/09/22 0949)  BP: 134/70 (01/09/22 0749)  SpO2: 99 % (01/08/22 2030) Vital Signs (24h Range):  Temp:  [97.6 °F (36.4 °C)-98.7 °F (37.1 °C)] 98.7 °F (37.1 °C)  Pulse:  [] 100  Resp:  [16-20] 18  SpO2:  [98 %-99 %] 99 %  BP: (122-134)/(64-70) 134/70        There is no height or weight on file to calculate BMI.      Intake/Output Summary (Last 24 hours) at 1/9/2022 1048  Last data filed at 1/8/2022 1830  Gross per 24 hour   Intake --   Output 800 ml   Net -800 ml         Significant Labs:  Lab Results   Component Value Date    GROUPTRH AB POS 01/08/2022    HEPBSAG Non-Reactive 06/14/2021     Recent Labs   Lab 01/09/22 0517   HGB 9.3*   HCT 29.0*       I have personallly reviewed all pertinent lab results from the last 24 hours.  Recent Lab Results       01/09/22  0517        Baso # 0.02       Basophil % 0.2       Differential Type Scan Smear       Eos # 0.04       Eosinophil % 0.4       Hematocrit 29.0        Hemoglobin 9.3       Immature Grans (Abs) 0.05       Immature Granulocytes 0.6       Lymph # 1.09       Lymph % 12.1       MCH 23.5       MCHC 32.1       MCV 73.4       Microcytes 1+       Mono # 0.61       Mono % 6.8       MPV 10.6       Neutrophils, Abs 7.22       Neutrophils Relative 79.9       nRBC 0.0       NUCLEATED RBC ABSOLUTE 0.00       Ovalocytes Few       PLATELET MORPHOLOGY Few Large Platelets       Platelets 190       Poly Few       RBC 3.95       RDW 14.4       Tear Drop Cells Few       WBC 9.03             Physical Exam:   Constitutional: She is oriented to person, place, and time. She appears well-developed and well-nourished.    HENT:   Head: Normocephalic and atraumatic.    Eyes: Pupils are equal, round, and reactive to light. EOM are normal.     Cardiovascular: Normal rate, regular rhythm and normal heart sounds.  Exam reveals no clubbing, no cyanosis and no edema.     Pulmonary/Chest: Effort normal and breath sounds normal.        Abdominal: Soft. Bowel sounds are normal. She exhibits abdominal incision. She exhibits no distension. There is abdominal tenderness.     Genitourinary:    Uterus normal.             Musculoskeletal: Normal range of motion and moves all extremeties.       Neurological: She is alert and oriented to person, place, and time.    Skin: Skin is warm and dry. No cyanosis. Nails show no clubbing.    Psychiatric: She has a normal mood and affect. Her behavior is normal. Judgment and thought content normal.       Assessment/Plan:     21 y.o. female  for:    * 39 weeks gestation of pregnancy  Pt admitted to L&D for induction of labor    S/P  section  Routine postpartum postoperative care with discharge planning in 1-2 days.    Gestational hypertension, third trimester  Monitor BP- Continue po Labetalol  BP currently stable    Gestational diabetes mellitus (GDM) in third trimester  Discontinue Accu-Cheks    The patient will require 2 hour Glucola at her 4-6 week  follow-up.        Disposition: As patient meets milestones, will plan to discharge 1-2 days.    Michelle Cuadra MD  Obstetrics  [facility] [place] -  Labor and Delivery

## 2022-01-09 NOTE — PLAN OF CARE
Problem: Diabetes in Pregnancy  Goal: Blood Glucose Level Within Targeted Range  2022 by Stephani Capone RN  Outcome: Ongoing, Progressing  2022 by Stephani Capone RN  Outcome: Ongoing, Progressing     Problem: Adult Inpatient Plan of Care  Goal: Plan of Care Review  2022 by Stephani Capone RN  Outcome: Ongoing, Progressing  2022 by Stephani Capone RN  Outcome: Ongoing, Progressing  Goal: Patient-Specific Goal (Individualized)  2022 by Stephani Capone RN  Outcome: Ongoing, Progressing  2022 by Stephani Capone RN  Outcome: Ongoing, Progressing  Goal: Absence of Hospital-Acquired Illness or Injury  2022 by Stephani Capone RN  Outcome: Ongoing, Progressing  2022 by Stephani Capone RN  Outcome: Ongoing, Progressing  Goal: Optimal Comfort and Wellbeing  2022 by Stephani Capone RN  Outcome: Ongoing, Progressing  2022 by Stephani Capone RN  Outcome: Ongoing, Progressing  Goal: Readiness for Transition of Care  2022 by Stephani Capone RN  Outcome: Ongoing, Progressing  2022 by Stephani Capone RN  Outcome: Ongoing, Progressing     Problem:  Fall Injury Risk  Goal: Absence of Fall, Infant Drop and Related Injury  2022 by Stephani Capone RN  Outcome: Ongoing, Progressing  2022 by Stephani Capone RN  Outcome: Ongoing, Progressing     Problem: Bleeding (Labor)  Goal: Hemostasis  2022 by Stephani Capone RN  Outcome: Ongoing, Progressing  2022 by Stephani Capone RN  Outcome: Unable to Meet, Plan Revised  2022 by Stephani Capone RN  Outcome: Ongoing, Progressing     Problem: Change in Fetal Wellbeing (Labor)  Goal: Stable Fetal Wellbeing  2022 by Stephani Capone RN  Outcome: Ongoing, Progressing  2022 by Stephani Capone RN  Outcome: Unable to Meet, Plan Revised  2022 by Stephani Capone, RN  Outcome: Ongoing, Progressing      Problem: Delayed Labor Progression (Labor)  Goal: Effective Progression to Delivery  2022 by Stephani Capone RN  Outcome: Ongoing, Progressing  2022 by Stephani Capone RN  Outcome: Unable to Meet, Plan Revised  2022 by Stephani Capone RN  Outcome: Ongoing, Progressing     Problem: Infection  Goal: Absence of Infection Signs and Symptoms  2022 by Stephani Capone RN  Outcome: Ongoing, Progressing  2022 by Stephani Capone RN  Outcome: Ongoing, Progressing  2022 by Stephani Capone RN  Outcome: Ongoing, Progressing     Problem: Adjustment to Role Transition (Postpartum  Delivery)  Goal: Successful Maternal Role Transition  2022 by Stephani Capone RN  Outcome: Ongoing, Progressing  2022 by Stephani Capone RN  Outcome: Ongoing, Progressing     Problem: Bleeding (Postpartum  Delivery)  Goal: Hemostasis  2022 by Stephani Capone RN  Outcome: Ongoing, Progressing  2022 by Stephani Capone RN  Outcome: Ongoing, Progressing     Problem: Infection (Postpartum  Delivery)  Goal: Absence of Infection Signs and Symptoms  2022 by Stephani Capone RN  Outcome: Ongoing, Progressing  2022 by Stephani Capone RN  Outcome: Ongoing, Progressing     Problem: Pain (Postpartum  Delivery)  Goal: Acceptable Pain Control  2022 by Stephani Capone RN  Outcome: Ongoing, Progressing  2022 184 by Stephani Capone RN  Outcome: Ongoing, Progressing     Problem: Postoperative Nausea and Vomiting (Postpartum  Delivery)  Goal: Nausea and Vomiting Relief  2022 by Stephani Capone RN  Outcome: Ongoing, Progressing  2022 by Stephani Capone RN  Outcome: Ongoing, Progressing     Problem: Postoperative Urinary Retention (Postpartum  Delivery)  Goal: Effective Urinary Elimination  2022 by Stephani Capone RN  Outcome: Ongoing, Progressing  2022 184 by  Stephani Capone, RN  Outcome: Ongoing, Progressing

## 2022-01-09 NOTE — SUBJECTIVE & OBJECTIVE
Interval History:  Patient is comfortable without complaint.  She is both breast and bottle feeding.  She is ambulating without difficulty  She is doing well this morning. She is tolerating a regular diet without nausea or vomiting. She is voiding spontaneously. She is ambulating. She has passed flatus, and has not a BM. Vaginal bleeding is mild. She denies fever or chills. Abdominal pain is moderate and controlled with oral medications. She Is breastfeeding. She desires circumcision for her male baby: yes.    Objective:     Vital Signs (Most Recent):  Temp: 98.7 °F (37.1 °C) (01/09/22 0749)  Pulse: 100 (01/09/22 0749)  Resp: 18 (01/09/22 0949)  BP: 134/70 (01/09/22 0749)  SpO2: 99 % (01/08/22 2030) Vital Signs (24h Range):  Temp:  [97.6 °F (36.4 °C)-98.7 °F (37.1 °C)] 98.7 °F (37.1 °C)  Pulse:  [] 100  Resp:  [16-20] 18  SpO2:  [98 %-99 %] 99 %  BP: (122-134)/(64-70) 134/70        There is no height or weight on file to calculate BMI.      Intake/Output Summary (Last 24 hours) at 1/9/2022 1048  Last data filed at 1/8/2022 1830  Gross per 24 hour   Intake --   Output 800 ml   Net -800 ml         Significant Labs:  Lab Results   Component Value Date    GROUPTRH AB POS 01/08/2022    HEPBSAG Non-Reactive 06/14/2021     Recent Labs   Lab 01/09/22  0517   HGB 9.3*   HCT 29.0*       I have personallly reviewed all pertinent lab results from the last 24 hours.  Recent Lab Results       01/09/22  0517        Baso # 0.02       Basophil % 0.2       Differential Type Scan Smear       Eos # 0.04       Eosinophil % 0.4       Hematocrit 29.0       Hemoglobin 9.3       Immature Grans (Abs) 0.05       Immature Granulocytes 0.6       Lymph # 1.09       Lymph % 12.1       MCH 23.5       MCHC 32.1       MCV 73.4       Microcytes 1+       Mono # 0.61       Mono % 6.8       MPV 10.6       Neutrophils, Abs 7.22       Neutrophils Relative 79.9       nRBC 0.0       NUCLEATED RBC ABSOLUTE 0.00       Ovalocytes Few       PLATELET  MORPHOLOGY Few Large Platelets       Platelets 190       Poly Few       RBC 3.95       RDW 14.4       Tear Drop Cells Few       WBC 9.03             Physical Exam:   Constitutional: She is oriented to person, place, and time. She appears well-developed and well-nourished.    HENT:   Head: Normocephalic and atraumatic.    Eyes: Pupils are equal, round, and reactive to light. EOM are normal.     Cardiovascular: Normal rate, regular rhythm and normal heart sounds.  Exam reveals no clubbing, no cyanosis and no edema.     Pulmonary/Chest: Effort normal and breath sounds normal.        Abdominal: Soft. Bowel sounds are normal. She exhibits abdominal incision. She exhibits no distension. There is abdominal tenderness.     Genitourinary:    Uterus normal.             Musculoskeletal: Normal range of motion and moves all extremeties.       Neurological: She is alert and oriented to person, place, and time.    Skin: Skin is warm and dry. No cyanosis. Nails show no clubbing.    Psychiatric: She has a normal mood and affect. Her behavior is normal. Judgment and thought content normal.

## 2022-01-09 NOTE — PLAN OF CARE
Problem: Diabetes in Pregnancy  Goal: Blood Glucose Level Within Targeted Range  Outcome: Ongoing, Progressing     Problem: Adult Inpatient Plan of Care  Goal: Plan of Care Review  Outcome: Ongoing, Progressing  Goal: Patient-Specific Goal (Individualized)  Outcome: Ongoing, Progressing  Goal: Absence of Hospital-Acquired Illness or Injury  Outcome: Ongoing, Progressing  Goal: Optimal Comfort and Wellbeing  Outcome: Ongoing, Progressing  Goal: Readiness for Transition of Care  Outcome: Ongoing, Progressing     Problem:  Fall Injury Risk  Goal: Absence of Fall, Infant Drop and Related Injury  Outcome: Ongoing, Progressing     Problem: Infection  Goal: Absence of Infection Signs and Symptoms  2022 by Stephani Capone RN  Outcome: Ongoing, Progressing  2022 by Stephani Capone RN  Outcome: Ongoing, Progressing     Problem: Adjustment to Role Transition (Postpartum  Delivery)  Goal: Successful Maternal Role Transition  Outcome: Ongoing, Progressing     Problem: Bleeding (Postpartum  Delivery)  Goal: Hemostasis  Outcome: Ongoing, Progressing     Problem: Infection (Postpartum  Delivery)  Goal: Absence of Infection Signs and Symptoms  Outcome: Ongoing, Progressing     Problem: Pain (Postpartum  Delivery)  Goal: Acceptable Pain Control  Outcome: Ongoing, Progressing     Problem: Postoperative Nausea and Vomiting (Postpartum  Delivery)  Goal: Nausea and Vomiting Relief  Outcome: Ongoing, Progressing     Problem: Postoperative Urinary Retention (Postpartum  Delivery)  Goal: Effective Urinary Elimination  Outcome: Ongoing, Progressing

## 2022-01-09 NOTE — PLAN OF CARE
Problem: Diabetes in Pregnancy  Goal: Blood Glucose Level Within Targeted Range  Outcome: Ongoing, Progressing     Problem: Adult Inpatient Plan of Care  Goal: Plan of Care Review  Outcome: Ongoing, Progressing  Goal: Patient-Specific Goal (Individualized)  Outcome: Ongoing, Progressing  Goal: Absence of Hospital-Acquired Illness or Injury  Outcome: Ongoing, Progressing  Goal: Optimal Comfort and Wellbeing  Outcome: Ongoing, Progressing  Goal: Readiness for Transition of Care  Outcome: Ongoing, Progressing     Problem:  Fall Injury Risk  Goal: Absence of Fall, Infant Drop and Related Injury  Outcome: Ongoing, Progressing     Problem: Bleeding (Labor)  Goal: Hemostasis  Outcome: Ongoing, Progressing     Problem: Change in Fetal Wellbeing (Labor)  Goal: Stable Fetal Wellbeing  Outcome: Ongoing, Progressing     Problem: Delayed Labor Progression (Labor)  Goal: Effective Progression to Delivery  Outcome: Ongoing, Progressing     Problem: Infection (Labor)  Goal: Absence of Infection Signs and Symptoms  Outcome: Ongoing, Progressing     Problem: Labor Pain (Labor)  Goal: Acceptable Pain Control  Outcome: Ongoing, Progressing     Problem: Uterine Tachysystole (Labor)  Goal: Normal Uterine Contraction Pattern  Outcome: Ongoing, Progressing     Problem: Infection  Goal: Absence of Infection Signs and Symptoms  Outcome: Ongoing, Progressing

## 2022-01-09 NOTE — PLAN OF CARE
Problem: Pain (Postpartum  Delivery)  Goal: Acceptable Pain Control  Intervention: Prevent or Manage Pain  Flowsheets (Taken 2022 1045)  Pain Management Interventions: relaxation techniques promoted   Plan of care discussed,pt voiced understanding   Problem: Pain (Postpartum  Delivery)  Goal: Acceptable Pain Control  Intervention: Prevent or Manage Pain  Flowsheets (Taken 2022 1045)  Pain Management Interventions: relaxation techniques promoted

## 2022-01-09 NOTE — PLAN OF CARE
Problem: Diabetes in Pregnancy  Goal: Blood Glucose Level Within Targeted Range  Outcome: Ongoing, Progressing     Problem: Adult Inpatient Plan of Care  Goal: Plan of Care Review  Outcome: Ongoing, Progressing  Goal: Patient-Specific Goal (Individualized)  Outcome: Ongoing, Progressing  Goal: Absence of Hospital-Acquired Illness or Injury  Outcome: Ongoing, Progressing  Goal: Optimal Comfort and Wellbeing  Outcome: Ongoing, Progressing  Goal: Readiness for Transition of Care  Outcome: Ongoing, Progressing     Problem:  Fall Injury Risk  Goal: Absence of Fall, Infant Drop and Related Injury  Outcome: Ongoing, Progressing     Problem: Infection  Goal: Absence of Infection Signs and Symptoms  2022 by Stephani Capone, RN  Outcome: Ongoing, Progressing  2022 by Stephani Capone RN  Outcome: Ongoing, Progressing

## 2022-01-09 NOTE — PLAN OF CARE
Problem: Diabetes in Pregnancy  Goal: Blood Glucose Level Within Targeted Range  Outcome: Ongoing, Progressing     Problem: Adult Inpatient Plan of Care  Goal: Plan of Care Review  Outcome: Ongoing, Progressing  Goal: Patient-Specific Goal (Individualized)  Outcome: Ongoing, Progressing  Goal: Absence of Hospital-Acquired Illness or Injury  Outcome: Ongoing, Progressing  Goal: Optimal Comfort and Wellbeing  Outcome: Ongoing, Progressing  Goal: Readiness for Transition of Care  Outcome: Ongoing, Progressing     Problem:  Fall Injury Risk  Goal: Absence of Fall, Infant Drop and Related Injury  Outcome: Ongoing, Progressing     Problem: Bleeding (Labor)  Goal: Hemostasis  Outcome: Ongoing, Progressing     Problem: Change in Fetal Wellbeing (Labor)  Goal: Stable Fetal Wellbeing  Outcome: Ongoing, Progressing     Problem: Delayed Labor Progression (Labor)  Goal: Effective Progression to Delivery  Outcome: Ongoing, Progressing     Problem: Infection  Goal: Absence of Infection Signs and Symptoms  Outcome: Ongoing, Progressing     Problem: Adjustment to Role Transition (Postpartum  Delivery)  Goal: Successful Maternal Role Transition  Outcome: Ongoing, Progressing     Problem: Bleeding (Postpartum  Delivery)  Goal: Hemostasis  Outcome: Ongoing, Progressing     Problem: Infection (Postpartum  Delivery)  Goal: Absence of Infection Signs and Symptoms  Outcome: Ongoing, Progressing     Problem: Pain (Postpartum  Delivery)  Goal: Acceptable Pain Control  Outcome: Ongoing, Progressing     Problem: Postoperative Nausea and Vomiting (Postpartum  Delivery)  Goal: Nausea and Vomiting Relief  Outcome: Ongoing, Progressing     Problem: Postoperative Urinary Retention (Postpartum  Delivery)  Goal: Effective Urinary Elimination  Outcome: Ongoing, Progressing

## 2022-01-10 LAB
HCO3 UR-SCNC: 20.7 MMOL/L (ref 24–28)
PCO2 BLDA: 45 MMHG
PH SMN: 7.27 [PH] (ref 7.32–7.42)
PO2 BLDA: 31 MMHG
POC BASE EXCESS: -6.2 MMOL/L (ref -2–2)
POC SATURATED O2: 74.5 %

## 2022-01-10 PROCEDURE — 25000003 PHARM REV CODE 250: Performed by: OBSTETRICS & GYNECOLOGY

## 2022-01-10 PROCEDURE — 11000001 HC ACUTE MED/SURG PRIVATE ROOM

## 2022-01-10 RX ORDER — HYDROCODONE BITARTRATE AND ACETAMINOPHEN 7.5; 325 MG/1; MG/1
1 TABLET ORAL EVERY 6 HOURS PRN
Qty: 28 TABLET | Refills: 0 | Status: SHIPPED | OUTPATIENT
Start: 2022-01-10 | End: 2022-08-15

## 2022-01-10 RX ORDER — LANOLIN ALCOHOL/MO/W.PET/CERES
1 CREAM (GRAM) TOPICAL 2 TIMES DAILY
Qty: 60 TABLET | Refills: 3 | Status: SHIPPED | OUTPATIENT
Start: 2022-01-10 | End: 2022-08-15

## 2022-01-10 RX ORDER — IBUPROFEN 800 MG/1
800 TABLET ORAL EVERY 8 HOURS PRN
Qty: 60 TABLET | Refills: 0 | Status: SHIPPED | OUTPATIENT
Start: 2022-01-10 | End: 2022-08-15

## 2022-01-10 RX ADMIN — HYDROCODONE BITARTRATE AND ACETAMINOPHEN 1 TABLET: 7.5; 325 TABLET ORAL at 11:01

## 2022-01-10 RX ADMIN — HYDROCODONE BITARTRATE AND ACETAMINOPHEN 1 TABLET: 7.5; 325 TABLET ORAL at 06:01

## 2022-01-10 RX ADMIN — IBUPROFEN 800 MG: 800 TABLET ORAL at 06:01

## 2022-01-10 RX ADMIN — Medication 1 TABLET: at 09:01

## 2022-01-10 RX ADMIN — DOCUSATE SODIUM 200 MG: 100 CAPSULE ORAL at 09:01

## 2022-01-10 RX ADMIN — IBUPROFEN 800 MG: 800 TABLET ORAL at 11:01

## 2022-01-10 RX ADMIN — SIMETHICONE 80 MG: 80 TABLET, CHEWABLE ORAL at 11:01

## 2022-01-10 RX ADMIN — DOCUSATE SODIUM 200 MG: 100 CAPSULE ORAL at 11:01

## 2022-01-10 NOTE — SUBJECTIVE & OBJECTIVE
Interval History: Pt is doing well, no complaints    She is doing well this morning. She is tolerating a regular diet without nausea or vomiting. She is voiding spontaneously. She is ambulating. She has passed flatus, and has not a BM. Vaginal bleeding is mild. She denies fever or chills. Abdominal pain is mild and controlled with oral medications. She Is breastfeeding. She desires circumcision for her male baby: yes.    Objective:     Vital Signs (Most Recent):  Temp: 97 °F (36.1 °C) (01/10/22 0746)  Pulse: 89 (01/10/22 0746)  Resp: 20 (01/10/22 0746)  BP: 134/73 (01/10/22 0746)  SpO2: 98 % (01/10/22 0746) Vital Signs (24h Range):  Temp:  [97 °F (36.1 °C)-97.4 °F (36.3 °C)] 97 °F (36.1 °C)  Pulse:  [89-96] 89  Resp:  [20] 20  SpO2:  [97 %-98 %] 98 %  BP: (128-134)/(72-73) 134/73        There is no height or weight on file to calculate BMI.    No intake or output data in the 24 hours ending 01/10/22 1450      Significant Labs:  Lab Results   Component Value Date    GROUPTRH AB POS 01/08/2022    HEPBSAG Non-Reactive 06/14/2021     Recent Labs   Lab 01/09/22  0517   HGB 9.3*   HCT 29.0*       I have personallly reviewed all pertinent lab results from the last 24 hours.  Recent Lab Results     None          Physical Exam:   Constitutional: She is oriented to person, place, and time. She appears well-developed and well-nourished.    HENT:   Head: Normocephalic and atraumatic.    Eyes: Pupils are equal, round, and reactive to light. EOM are normal.     Cardiovascular: Normal rate, regular rhythm and normal heart sounds.  Exam reveals no clubbing, no cyanosis and no edema.     Pulmonary/Chest: Effort normal and breath sounds normal.        Abdominal: Soft. Bowel sounds are normal. She exhibits abdominal incision. She exhibits no distension. There is abdominal tenderness.     Genitourinary:    Uterus normal.             Musculoskeletal: Normal range of motion and moves all extremeties.       Neurological: She is alert and  oriented to person, place, and time.    Skin: Skin is warm and dry. No cyanosis. Nails show no clubbing.    Psychiatric: She has a normal mood and affect. Her behavior is normal. Judgment and thought content normal.

## 2022-01-10 NOTE — PROGRESS NOTES
[facility] -  Labor and Delivery  Obstetrics  Postpartum Progress Note    Patient Name: Alida Tompkins  MRN: [id number]  Admission Date: 1/8/2022  Hospital Length of Stay: 2 days  Attending Physician: Michelle Cuadra MD  Primary Care Provider: Primary Doctor No    Subjective:     Principal Problem:39 weeks gestation of pregnancy    Hospital Course:  No notes on file    Interval History: Pt is doing well, no complaints    She is doing well this morning. She is tolerating a regular diet without nausea or vomiting. She is voiding spontaneously. She is ambulating. She has passed flatus, and has not a BM. Vaginal bleeding is mild. She denies fever or chills. Abdominal pain is mild and controlled with oral medications. She Is breastfeeding. She desires circumcision for her male baby: yes.    Objective:     Vital Signs (Most Recent):  Temp: 97 °F (36.1 °C) (01/10/22 0746)  Pulse: 89 (01/10/22 0746)  Resp: 20 (01/10/22 0746)  BP: 134/73 (01/10/22 0746)  SpO2: 98 % (01/10/22 0746) Vital Signs (24h Range):  Temp:  [97 °F (36.1 °C)-97.4 °F (36.3 °C)] 97 °F (36.1 °C)  Pulse:  [89-96] 89  Resp:  [20] 20  SpO2:  [97 %-98 %] 98 %  BP: (128-134)/(72-73) 134/73        There is no height or weight on file to calculate BMI.    No intake or output data in the 24 hours ending 01/10/22 1450      Significant Labs:  Lab Results   Component Value Date    GROUPTRH AB POS 01/08/2022    HEPBSAG Non-Reactive 06/14/2021     Recent Labs   Lab 01/09/22  0517   HGB 9.3*   HCT 29.0*       I have personallly reviewed all pertinent lab results from the last 24 hours.  Recent Lab Results     None          Physical Exam:   Constitutional: She is oriented to person, place, and time. She appears well-developed and well-nourished.    HENT:   Head: Normocephalic and atraumatic.    Eyes: Pupils are equal, round, and reactive to light. EOM are normal.     Cardiovascular: Normal rate, regular rhythm and normal heart sounds.  Exam reveals no clubbing, no  cyanosis and no edema.     Pulmonary/Chest: Effort normal and breath sounds normal.        Abdominal: Soft. Bowel sounds are normal. She exhibits abdominal incision. She exhibits no distension. There is abdominal tenderness.     Genitourinary:    Uterus normal.             Musculoskeletal: Normal range of motion and moves all extremeties.       Neurological: She is alert and oriented to person, place, and time.    Skin: Skin is warm and dry. No cyanosis. Nails show no clubbing.    Psychiatric: She has a normal mood and affect. Her behavior is normal. Judgment and thought content normal.       Assessment/Plan:     21 y.o. female  for:    * 39 weeks gestation of pregnancy  Pt admitted to L&D for induction of labor    S/P  section  Routine postpartum postoperative care with discharge planning in 1-2 days.    Gestational hypertension, third trimester  Monitor BP- Continue po Labetalol  BP currently stable    Gestational diabetes mellitus (GDM) in third trimester  Discontinue Accu-Cheks    The patient will require 2 hour Glucola at her 4-6 week follow-up.        Disposition: As patient meets milestones, will plan to discharge in the a.m..    Michelle Cuadra MD  Obstetrics  [facility] -  Labor and Delivery

## 2022-01-10 NOTE — LACTATION NOTE
This note was copied from a baby's chart.  Breastfeeding rounds done, mom states infant has been latching well, but milk is not in so has been bottle feeding until milk comes in. Mom encouraged to put infant to breast at every feed to help stimulate milk production, then offer bottle if needed, mom verbalized understanding, mom to call with any needs

## 2022-01-11 VITALS
SYSTOLIC BLOOD PRESSURE: 143 MMHG | OXYGEN SATURATION: 99 % | DIASTOLIC BLOOD PRESSURE: 80 MMHG | TEMPERATURE: 97 F | RESPIRATION RATE: 17 BRPM | HEART RATE: 86 BPM

## 2022-01-11 NOTE — LACTATION NOTE
This note was copied from a baby's chart.  Breastfeeding rounds done, mom states infant latching well, mom denies questions or concerns

## 2022-01-13 NOTE — ANESTHESIA POSTPROCEDURE EVALUATION
Anesthesia Post Evaluation    Patient: Alida Tompkins    Procedure(s) Performed: Procedure(s) (LRB):   SECTION (N/A)    Final Anesthesia Type: epidural      Patient location during evaluation: labor & delivery  Patient participation: Yes- Able to Participate  Level of consciousness: awake and alert  Post-procedure vital signs: reviewed and stable  Pain management: adequate  Airway patency: patent    PONV status at discharge: No PONV  Anesthetic complications: no      Cardiovascular status: blood pressure returned to baseline  Respiratory status: unassisted  Hydration status: euvolemic  Follow-up not needed.          Vitals Value Taken Time   /80 22 08   Temp 35.8 °C (96.5 °F) 22 08   Pulse 86 22   Resp 17 22   SpO2 99 % 22         No case tracking events are documented in the log.      Pain/John Score: No data recorded

## 2022-01-20 ENCOUNTER — OFFICE VISIT (OUTPATIENT)
Dept: OBSTETRICS AND GYNECOLOGY | Facility: CLINIC | Age: 22
End: 2022-01-20
Payer: MEDICAID

## 2022-01-20 VITALS
BODY MASS INDEX: 36.82 KG/M2 | HEART RATE: 68 BPM | HEIGHT: 65 IN | RESPIRATION RATE: 18 BRPM | TEMPERATURE: 98 F | SYSTOLIC BLOOD PRESSURE: 131 MMHG | WEIGHT: 221 LBS | DIASTOLIC BLOOD PRESSURE: 85 MMHG

## 2022-01-20 PROCEDURE — 3079F PR MOST RECENT DIASTOLIC BLOOD PRESSURE 80-89 MM HG: ICD-10-PCS | Mod: CPTII,,, | Performed by: OBSTETRICS & GYNECOLOGY

## 2022-01-20 PROCEDURE — 0503F POSTPARTUM CARE VISIT: CPT | Mod: CPTII,,, | Performed by: OBSTETRICS & GYNECOLOGY

## 2022-01-20 PROCEDURE — 0503F PR POSTPARTUM CARE VISIT: ICD-10-PCS | Mod: CPTII,,, | Performed by: OBSTETRICS & GYNECOLOGY

## 2022-01-20 PROCEDURE — 3008F BODY MASS INDEX DOCD: CPT | Mod: CPTII,,, | Performed by: OBSTETRICS & GYNECOLOGY

## 2022-01-20 PROCEDURE — 3075F SYST BP GE 130 - 139MM HG: CPT | Mod: CPTII,,, | Performed by: OBSTETRICS & GYNECOLOGY

## 2022-01-20 PROCEDURE — 3079F DIAST BP 80-89 MM HG: CPT | Mod: CPTII,,, | Performed by: OBSTETRICS & GYNECOLOGY

## 2022-01-20 PROCEDURE — 3008F PR BODY MASS INDEX (BMI) DOCUMENTED: ICD-10-PCS | Mod: CPTII,,, | Performed by: OBSTETRICS & GYNECOLOGY

## 2022-01-20 PROCEDURE — 3075F PR MOST RECENT SYSTOLIC BLOOD PRESS GE 130-139MM HG: ICD-10-PCS | Mod: CPTII,,, | Performed by: OBSTETRICS & GYNECOLOGY

## 2022-01-20 NOTE — PROGRESS NOTES
Postpartum Visit  Alida Tompkins is a 22 y.o. female  is here for a postpartum visit. She is 2 weeks postpartum following a low cervical transverse  section, of a male infant weighin lb 7.3 oz, with Anesthesia: epidural. . The delivery was at 39 w 0d.     Pregnancy was complicated by: gestational hypertension..        Postpartum course has been uncomplicated.  Bleeding staining only. Bowel/ bladder function is normal. Her last pap was unknown.  Patient is not sexually active. Desired contraception method is none.   Postpartum depression screening: negative.  Baby's course has been uncomplicated. Baby is feeding by bottle - Enfamil AR.     OB History    Para Term  AB Living   2 2 2     2   SAB IAB Ectopic Multiple Live Births         0 2      # Outcome Date GA Lbr Joshua/2nd Weight Sex Delivery Anes PTL Lv   2 Term 22 39w0d  3.835 kg (8 lb 7.3 oz) M CS-LTranv EPI  JOSE      Complications: Fetal Intolerance   1 Term 17 39w0d  3.402 kg (7 lb 8 oz) F Vag-Spont EPI  JOSE         Review of patient's allergies indicates:  No Known Allergies    Past Medical History:   Diagnosis Date    Gestational diabetes mellitus (GDM) in third trimester 11/15/2021    Gestational hypertension, third trimester 11/15/2021    Herpes simplex virus (HSV) infection      Past Surgical History:   Procedure Laterality Date     SECTION N/A 2022    Procedure:  SECTION;  Surgeon: Michelle Cuadra MD;  Location: [facility] L&D;  Service: OB/GYN;  Laterality: N/A;     OB History        2    Para   2    Term   2            AB        Living   2       SAB        IAB        Ectopic        Multiple   0    Live Births   2               Family History   Problem Relation Age of Onset    Hypertension Father     Diabetes Mother     Hypertension Mother     No Known Problems Brother     Hypertension Brother     No Known Problems Daughter      Social History     Tobacco Use     Smoking status: Former Smoker    Smokeless tobacco: Never Used   Substance Use Topics    Alcohol use: Not Currently    Drug use: Never       Current Outpatient Medications   Medication Sig    ferrous sulfate (FEOSOL) Tab tablet Take 1 tablet (1 each total) by mouth 2 (two) times daily.    ibuprofen (ADVIL,MOTRIN) 800 MG tablet Take 1 tablet (800 mg total) by mouth every 8 (eight) hours as needed for Pain.    mvn-min75-iron-iron ps-om3-dha (CONCEPT DHA) 35-1-200 mg Cap Take 1 capsule by mouth once daily.    HYDROcodone-acetaminophen (NORCO) 7.5-325 mg per tablet Take 1 tablet by mouth every 6 (six) hours as needed for Pain. (Patient not taking: Reported on 1/20/2022)    labetaloL (NORMODYNE) 100 MG tablet Take 1 tablet (100 mg total) by mouth 2 (two) times daily. (Patient not taking: Reported on 1/20/2022)    norethindrone (MICRONOR) 0.35 mg tablet Take 1 tablet (0.35 mg total) by mouth once daily.    valACYclovir (VALTREX) 1000 MG tablet Take 1 tablet (1,000 mg total) by mouth once daily. (Patient not taking: Reported on 1/20/2022)     No current facility-administered medications for this visit.         ROS:  GENERAL: No fever, chills, fatigability.  VULVAR: No pain, no lesions and no itching.  VAGINAL: No relaxation, no itching, no discharge, no abnormal bleeding and no lesions.  ABDOMEN: No abdominal pain. Denies nausea. Denies vomiting. No diarrhea. No constipation  BREAST: Denies pain. No lumps. No discharge.  URINARY: No incontinence, no nocturia, no frequency and no dysuria.  CARDIOVASCULAR: No chest pain. No shortness of breath. No leg cramps.  NEUROLOGICAL: No headaches. No vision changes.      General appearance - alert, well appearing, and in no distress  Mental status - alert, oriented to person, place, and time  Skin - coloration normal for race, good turgor, warm to touch, no rashes  Abdomen - soft, nontender, nondistended, no masses or organomegaly  Pfannenstiel incision: Clean, dry, intact -  healing well.  Extremities - no edema, redness or tenderness in the calves or thighs      Alida was seen today for postpartum care and depression.    Diagnoses and all orders for this visit:    Postpartum care following  delivery        Discussed contraception - pt desires nothing  Counseling regarding resuming normal activities of exercise and work.  Postpartum precautions reviewed    Routine follow up in 4 weeks

## 2022-01-29 ENCOUNTER — PATIENT MESSAGE (OUTPATIENT)
Dept: OBSTETRICS AND GYNECOLOGY | Facility: CLINIC | Age: 22
End: 2022-01-29
Payer: MEDICAID

## 2022-02-02 NOTE — HOSPITAL COURSE
This is a 22-year-old  2 para 2004 female minute 39 weeks for induction of labor at term.  Pregnancy was complicated by gestational hypertension and gestational diabetes.  Caesarean section performed secondary to nonreassuring fetal heart tracing.  Patient delivered a live born baby via  section weighing 8 lb 7 oz Apgars 9 and 9 her hospital course was unremarkable:  By postop operative day 3. She was ready for discharge.  Condition stable.  Disposition to home.  Patient was breast-feeding and undecided regarding her method of contraception.

## 2022-02-02 NOTE — DISCHARGE SUMMARY
[facility] -  Labor and Delivery  Obstetrics  Discharge Summary      Patient Name: Alida Tompkins  MRN: [id number]  Admission Date: 2022  Hospital Length of Stay: 3 days  Discharge Date and Time: 2022  1:31 PM  Attending Physician: Sheryl att. providers found   Discharging Provider: Michelle Cuadra MD   Primary Care Provider: Primary Doctor Sheryl    HPI: This is a 21-year-old  2 para 1001 admitted at 39 weeks 0 day for induction of labor at bedtime.  Prenatal care with Dr. Fountain complicated by gestational diabetes and gestational hypertension.  Patient has yourInsulin controlled with regard to her diabetes.  Was placed on labetalol 100 mg p.o. b.i.d. await blood pressure is stable.  She is GBS negative.          Procedure(s) (LRB):   SECTION (N/A)     Hospital Course:   This is a 22-year-old  2 para  female minute 39 weeks for induction of labor at term.  Pregnancy was complicated by gestational hypertension and gestational diabetes.  Caesarean section performed secondary to nonreassuring fetal heart tracing.  Patient delivered a live born baby via  section weighing 8 lb 7 oz Apgars 9 and 9 her hospital course was unremarkable:  By postop operative day 3. She was ready for discharge.  Condition stable.  Disposition to home.  Patient was breast-feeding and undecided regarding her method of contraception.           Final Active Diagnoses:    Diagnosis Date Noted POA    PRINCIPAL PROBLEM:  39 weeks gestation of pregnancy [Z3A.39] 2022 Not Applicable    S/P  section [Z98.891] 2022 Not Applicable    Gestational diabetes mellitus (GDM) in third trimester [O24.419] 11/15/2021 Yes    Gestational hypertension, third trimester [O13.3] 11/15/2021 Yes      Problems Resolved During this Admission:        Significant Diagnostic Studies: Labs: CBC No results for input(s): WBC, HGB, HCT, PLT in the last 48 hours.      Feeding Method: breast    Immunizations     None           Delivery:    Episiotomy:     Lacerations:     Repair suture:     Repair # of packets:     Blood loss (ml):       Birth information:  YOB: 2024   Time of birth: 10:10 AM   Sex: male   Delivery type: , Low Transverse   Gestational Age: 39w0d    Delivery Clinician:      Other providers:       Additional  information:  Forceps:    Vacuum:    Breech:    Observed anomalies      Living?:           APGARS  One minute Five minutes Ten minutes   Skin color:         Heart rate:         Grimace:         Muscle tone:         Breathing:         Totals: 9  9        Placenta: Delivered:       appearance    Pending Diagnostic Studies:     None          Discharged Condition: good    Disposition: Home or Self Care    Follow Up:   Follow-up Information     Michelle Cuadra MD In 2 weeks.    Specialty: Obstetrics and Gynecology  Contact information:  [address] [place] [address]  [facility] [address]  [phone]                       Patient Instructions:      Diet Adult Regular     Pelvic Rest     Notify your health care provider if you experience any of the following:  temperature >100.4     Notify your health care provider if you experience any of the following:  persistent nausea and vomiting or diarrhea     Notify your health care provider if you experience any of the following:  severe uncontrolled pain     Notify your health care provider if you experience any of the following:  redness, tenderness, or signs of infection (pain, swelling, redness, odor or green/yellow discharge around incision site)     Notify your health care provider if you experience any of the following:  difficulty breathing or increased cough     Notify your health care provider if you experience any of the following:  severe persistent headache     Notify your health care provider if you experience any of the following:  worsening rash     Notify your health care provider if you experience any of the following:   "persistent dizziness, light-headedness, or visual disturbances     Notify your health care provider if you experience any of the following:  increased confusion or weakness     Medications:  Discharge Medication List as of 1/10/2022  2:56 PM      START taking these medications    Details   ferrous sulfate (FEOSOL) Tab tablet Take 1 tablet (1 each total) by mouth 2 (two) times daily., Starting Mon 1/10/2022, Until Tue 1/10/2023, Normal      HYDROcodone-acetaminophen (NORCO) 7.5-325 mg per tablet Take 1 tablet by mouth every 6 (six) hours as needed for Pain., Starting Mon 1/10/2022, Normal      ibuprofen (ADVIL,MOTRIN) 800 MG tablet Take 1 tablet (800 mg total) by mouth every 8 (eight) hours as needed for Pain., Starting Mon 1/10/2022, Normal         CONTINUE these medications which have NOT CHANGED    Details   labetaloL (NORMODYNE) 100 MG tablet Take 1 tablet (100 mg total) by mouth 2 (two) times daily., Starting Mon 10/25/2021, Until Tue 10/25/2022, Normal      mvn-min75-iron-iron ps-om3-dha (CONCEPT DHA) 35-1-200 mg Cap Take 1 capsule by mouth once daily., Historical Med      valACYclovir (VALTREX) 1000 MG tablet Take 1 tablet (1,000 mg total) by mouth once daily., Starting Mon 6/14/2021, Until Tue 6/14/2022, Normal         STOP taking these medications       blood sugar diagnostic Strp Comments:   Reason for Stopping:         blood-glucose meter Misc Comments:   Reason for Stopping:         insulin NPH (HUMULIN N NPH U-100 INSULIN) 100 unit/mL injection Comments:   Reason for Stopping:         insulin regular 100 unit/mL Inj injection Comments:   Reason for Stopping:         insulin syringe-needle U-100 0.3 mL 31 gauge x 15/64" Syrg Comments:   Reason for Stopping:         lancets (ACCU-CHEK SOFTCLIX LANCETS) Misc Comments:   Reason for Stopping:               Michelle Cuadra MD  Obstetrics  [facility] -  Labor and Delivery  "

## 2022-02-17 ENCOUNTER — OFFICE VISIT (OUTPATIENT)
Dept: OBSTETRICS AND GYNECOLOGY | Facility: CLINIC | Age: 22
End: 2022-02-17
Payer: MEDICAID

## 2022-02-17 VITALS
WEIGHT: 218 LBS | DIASTOLIC BLOOD PRESSURE: 80 MMHG | HEIGHT: 65 IN | HEART RATE: 69 BPM | RESPIRATION RATE: 18 BRPM | TEMPERATURE: 97 F | BODY MASS INDEX: 36.32 KG/M2 | SYSTOLIC BLOOD PRESSURE: 127 MMHG

## 2022-02-17 DIAGNOSIS — Z30.9 ENCOUNTER FOR CONTRACEPTIVE MANAGEMENT, UNSPECIFIED TYPE: Primary | ICD-10-CM

## 2022-02-17 DIAGNOSIS — Z30.011 ENCOUNTER FOR INITIAL PRESCRIPTION OF CONTRACEPTIVE PILLS: ICD-10-CM

## 2022-02-17 LAB
B-HCG UR QL: NEGATIVE
CTP QC/QA: YES

## 2022-02-17 PROCEDURE — 3079F PR MOST RECENT DIASTOLIC BLOOD PRESSURE 80-89 MM HG: ICD-10-PCS | Mod: CPTII,,, | Performed by: OBSTETRICS & GYNECOLOGY

## 2022-02-17 PROCEDURE — 81025 URINE PREGNANCY TEST: CPT | Mod: QW,,, | Performed by: OBSTETRICS & GYNECOLOGY

## 2022-02-17 PROCEDURE — 3008F PR BODY MASS INDEX (BMI) DOCUMENTED: ICD-10-PCS | Mod: CPTII,,, | Performed by: OBSTETRICS & GYNECOLOGY

## 2022-02-17 PROCEDURE — 0503F POSTPARTUM CARE VISIT: CPT | Mod: ,,, | Performed by: OBSTETRICS & GYNECOLOGY

## 2022-02-17 PROCEDURE — 81025 POCT URINE PREGNANCY: ICD-10-PCS | Mod: QW,,, | Performed by: OBSTETRICS & GYNECOLOGY

## 2022-02-17 PROCEDURE — 3079F DIAST BP 80-89 MM HG: CPT | Mod: CPTII,,, | Performed by: OBSTETRICS & GYNECOLOGY

## 2022-02-17 PROCEDURE — 0503F PR POSTPARTUM CARE VISIT: ICD-10-PCS | Mod: ,,, | Performed by: OBSTETRICS & GYNECOLOGY

## 2022-02-17 PROCEDURE — 3074F PR MOST RECENT SYSTOLIC BLOOD PRESSURE < 130 MM HG: ICD-10-PCS | Mod: CPTII,,, | Performed by: OBSTETRICS & GYNECOLOGY

## 2022-02-17 PROCEDURE — 3074F SYST BP LT 130 MM HG: CPT | Mod: CPTII,,, | Performed by: OBSTETRICS & GYNECOLOGY

## 2022-02-17 PROCEDURE — 1159F MED LIST DOCD IN RCRD: CPT | Mod: CPTII,,, | Performed by: OBSTETRICS & GYNECOLOGY

## 2022-02-17 PROCEDURE — 1159F PR MEDICATION LIST DOCUMENTED IN MEDICAL RECORD: ICD-10-PCS | Mod: CPTII,,, | Performed by: OBSTETRICS & GYNECOLOGY

## 2022-02-17 PROCEDURE — 3008F BODY MASS INDEX DOCD: CPT | Mod: CPTII,,, | Performed by: OBSTETRICS & GYNECOLOGY

## 2022-02-17 RX ORDER — ACETAMINOPHEN AND CODEINE PHOSPHATE 120; 12 MG/5ML; MG/5ML
1 SOLUTION ORAL DAILY
Qty: 30 TABLET | Refills: 11 | Status: SHIPPED | OUTPATIENT
Start: 2022-02-17 | End: 2022-08-15

## 2022-02-17 NOTE — PROGRESS NOTES
Postpartum Visit  Alida Tompkins is a 22 y.o. female  is here for a postpartum visit. She is 5 weeks postpartum following a low cervical transverse  section, of a male infant weighinlb 7.3oz, with Anesthesia: epidural. . The delivery was at 39w 0d.     Pregnancy was complicated by: fetal intolerance.        Postpartum course has been uncomplicated.  Bleeding thin lochia. Bowel/ bladder function is normal. Her last pap was unknown.  Patient is not sexually active. Desired contraception method is oral progesterone-only contraceptive.   Postpartum depression screening: negative.  Baby's course has been uncomplicated. Baby is feeding by both breast and bottle - Enfamil AR.     OB History    Para Term  AB Living   2 2 2     2   SAB IAB Ectopic Multiple Live Births         0 2      # Outcome Date GA Lbr Joshua/2nd Weight Sex Delivery Anes PTL Lv   2 Term 20 39w0d  3.835 kg (8 lb 7.3 oz) M CS-LTranv EPI  JOSE      Complications: Fetal Intolerance   1 Term 15 39w0d  3.402 kg (7 lb 8 oz) F Vag-Spont EPI  JOSE         Review of patient's allergies indicates:  No Known Allergies    Past Medical History:   Diagnosis Date    Gestational diabetes mellitus (GDM) in third trimester 11/15/2021    Gestational hypertension, third trimester 11/15/2021    Herpes simplex virus (HSV) infection      Past Surgical History:   Procedure Laterality Date     SECTION N/A 2022    Procedure:  SECTION;  Surgeon: Michelle Cuadra MD;  Location: [facility] L&D;  Service: OB/GYN;  Laterality: N/A;     OB History        2    Para   2    Term   2            AB        Living   2       SAB        IAB        Ectopic        Multiple   0    Live Births   2               Family History   Problem Relation Age of Onset    Hypertension Father     Diabetes Mother     Hypertension Mother     No Known Problems Brother     Hypertension Brother     No Known Problems Daughter      Social  History     Tobacco Use    Smoking status: Former Smoker    Smokeless tobacco: Never Used   Substance Use Topics    Alcohol use: Not Currently    Drug use: Never       Current Outpatient Medications   Medication Sig    ferrous sulfate (FEOSOL) Tab tablet Take 1 tablet (1 each total) by mouth 2 (two) times daily.    HYDROcodone-acetaminophen (NORCO) 7.5-325 mg per tablet Take 1 tablet by mouth every 6 (six) hours as needed for Pain. (Patient not taking: Reported on 1/20/2022)    ibuprofen (ADVIL,MOTRIN) 800 MG tablet Take 1 tablet (800 mg total) by mouth every 8 (eight) hours as needed for Pain.    labetaloL (NORMODYNE) 100 MG tablet Take 1 tablet (100 mg total) by mouth 2 (two) times daily. (Patient not taking: Reported on 1/20/2022)    mvn-min75-iron-iron ps-om3-dha (CONCEPT DHA) 35-1-200 mg Cap Take 1 capsule by mouth once daily.    norethindrone (MICRONOR) 0.35 mg tablet Take 1 tablet (0.35 mg total) by mouth once daily.    valACYclovir (VALTREX) 1000 MG tablet Take 1 tablet (1,000 mg total) by mouth once daily. (Patient not taking: Reported on 1/20/2022)     No current facility-administered medications for this visit.         ROS:  GENERAL: No fever, chills, fatigability.  VULVAR: No pain, no lesions and no itching.  VAGINAL: No relaxation, no itching, no discharge, no abnormal bleeding and no lesions.  ABDOMEN: No abdominal pain. Denies nausea. Denies vomiting. No diarrhea. No constipation  BREAST: Denies pain. No lumps. No discharge.  URINARY: No incontinence, no nocturia, no frequency and no dysuria.  CARDIOVASCULAR: No chest pain. No shortness of breath. No leg cramps.  NEUROLOGICAL: No headaches. No vision changes.      General appearance - alert, well appearing, and in no distress, oriented to person, place, and time and normal appearing weight  Mental status - alert, oriented to person, place, and time, normal mood, behavior, speech, dress, motor activity, and thought processes  Skin -  coloration normal for race, good turgor, warm to touch, no rashes  Abdomen - soft, nontender, nondistended, no masses or organomegaly  Pfannenstiel incision: Clean, dry, intact - healing well.  Pelvic -   External genitalia postpartum: normal, well-healed, without lesions or masses.  Normal female hair distribution. Adequate perineal body. Urethral meatus without lesions or prolapse. Urethra: no masses, tenderness, or scarring.  Bladder: without tenderness or masses.  Vaginal mucosa moist and pink, normal rugae, without lesions, abnormal discharge, or foul odor.  Cervix pink, no lesions, no cervical motion tenderness.  Uterus: midline, non tender, smooth, not enlarged, not prolapsed  No adnexal masses or tenderness.  Extremities - no edema, redness or tenderness in the calves or thighs      Alida was seen today for postpartum care and consult.    Diagnoses and all orders for this visit:    Encounter for contraceptive management, unspecified type  -     POCT urine pregnancy    Encounter for initial prescription of contraceptive pills    Other orders  -     norethindrone (MICRONOR) 0.35 mg tablet; Take 1 tablet (0.35 mg total) by mouth once daily.        Discussed contraception - pt desires OCP  Counseling regarding resuming normal activities of exercise and work.  Postpartum precautions reviewed    Routine follow up in 1 yr

## 2022-02-25 NOTE — PROCEDURES
Procedures   Ultrasound note:    BPD 35 weeks 0 day  Head circumference 36 weeks 3 days  Abdominal circumference 33 weeks 4 day  Femur length 36 weeks 3 day    Fetal heart 144 beats per minute  ROBERT 11.37 cm  Fetal position vertex  Placenta location posterior    Impression:  Estimated delivery January 7, 2022  Estimated gestational age 35 weeks 3 day  Estimated fetal weight 5 lb 9 oz (2532 g)  Corrected gestational age (EFW) 35 weeks 4 day  Sixty-first percentile    Biophysical profile:  Fetal movement-2  Fetal breathing movement-0  Fetal tone-2  Amniotic fluid volume-2  Total-6

## 2022-02-27 NOTE — PROCEDURES
Procedures   Ultrasound note:    BPD 37 weeks 5 day  Head circumference 360 day  Abdominal 36 weeks 3 day  Femur length 31 day    Fetal heart 147 beats per minute  ROBERT 7.58 cm  Fetal position vertex  Location posterior    Impression:  Estimated delivery January 5, 2022  Estimated gestational age 36 week 6 day  Estimated fetal weight 6 lb 15 oz (3140 g)  Corrected gestational age (EFW) 39 weeks 0 day  Ninetieth percentile    Biophysical profile:  Fetal movement-2  Fetal movement-2  Fetal tone-2  Amniotic fluid volume -2  Total-8

## 2022-04-11 PROBLEM — O13.3 GESTATIONAL HYPERTENSION, THIRD TRIMESTER: Status: RESOLVED | Noted: 2021-11-15 | Resolved: 2022-04-11

## 2022-07-26 ENCOUNTER — OFFICE VISIT (OUTPATIENT)
Dept: FAMILY MEDICINE | Facility: CLINIC | Age: 22
End: 2022-07-26
Payer: MEDICAID

## 2022-07-26 VITALS
HEART RATE: 82 BPM | SYSTOLIC BLOOD PRESSURE: 124 MMHG | DIASTOLIC BLOOD PRESSURE: 80 MMHG | RESPIRATION RATE: 20 BRPM | BODY MASS INDEX: 37.88 KG/M2 | WEIGHT: 227.38 LBS | TEMPERATURE: 99 F | OXYGEN SATURATION: 98 % | HEIGHT: 65 IN

## 2022-07-26 DIAGNOSIS — N91.2 AMENORRHEA: ICD-10-CM

## 2022-07-26 DIAGNOSIS — Z86.32 HX OF GESTATIONAL DIABETES MELLITUS, NOT CURRENTLY PREGNANT: ICD-10-CM

## 2022-07-26 DIAGNOSIS — R73.9 ELEVATED BLOOD SUGAR: Primary | ICD-10-CM

## 2022-07-26 PROBLEM — Z3A.39 39 WEEKS GESTATION OF PREGNANCY: Status: RESOLVED | Noted: 2022-01-08 | Resolved: 2022-07-26

## 2022-07-26 PROBLEM — O24.419 GESTATIONAL DIABETES MELLITUS (GDM) IN THIRD TRIMESTER: Status: RESOLVED | Noted: 2021-11-15 | Resolved: 2022-07-26

## 2022-07-26 PROBLEM — Z3A.31 31 WEEKS GESTATION OF PREGNANCY: Status: RESOLVED | Noted: 2021-11-15 | Resolved: 2022-07-26

## 2022-07-26 PROBLEM — Z79.899 HIGH RISK MEDICATION USE: Status: ACTIVE | Noted: 2022-07-26

## 2022-07-26 LAB
B-HCG UR QL: NEGATIVE
CTP QC/QA: YES

## 2022-07-26 PROCEDURE — 3074F PR MOST RECENT SYSTOLIC BLOOD PRESSURE < 130 MM HG: ICD-10-PCS | Mod: CPTII,,, | Performed by: NURSE PRACTITIONER

## 2022-07-26 PROCEDURE — 83036 HEMOGLOBIN GLYCOSYLATED A1C: CPT | Mod: ,,, | Performed by: CLINICAL MEDICAL LABORATORY

## 2022-07-26 PROCEDURE — 83036 HEMOGLOBIN A1C: ICD-10-PCS | Mod: ,,, | Performed by: CLINICAL MEDICAL LABORATORY

## 2022-07-26 PROCEDURE — 99213 PR OFFICE/OUTPT VISIT, EST, LEVL III, 20-29 MIN: ICD-10-PCS | Mod: ,,, | Performed by: NURSE PRACTITIONER

## 2022-07-26 PROCEDURE — 3079F DIAST BP 80-89 MM HG: CPT | Mod: CPTII,,, | Performed by: NURSE PRACTITIONER

## 2022-07-26 PROCEDURE — 3079F PR MOST RECENT DIASTOLIC BLOOD PRESSURE 80-89 MM HG: ICD-10-PCS | Mod: CPTII,,, | Performed by: NURSE PRACTITIONER

## 2022-07-26 PROCEDURE — 3008F PR BODY MASS INDEX (BMI) DOCUMENTED: ICD-10-PCS | Mod: CPTII,,, | Performed by: NURSE PRACTITIONER

## 2022-07-26 PROCEDURE — 1159F PR MEDICATION LIST DOCUMENTED IN MEDICAL RECORD: ICD-10-PCS | Mod: CPTII,,, | Performed by: NURSE PRACTITIONER

## 2022-07-26 PROCEDURE — 3008F BODY MASS INDEX DOCD: CPT | Mod: CPTII,,, | Performed by: NURSE PRACTITIONER

## 2022-07-26 PROCEDURE — 3074F SYST BP LT 130 MM HG: CPT | Mod: CPTII,,, | Performed by: NURSE PRACTITIONER

## 2022-07-26 PROCEDURE — 99213 OFFICE O/P EST LOW 20 MIN: CPT | Mod: ,,, | Performed by: NURSE PRACTITIONER

## 2022-07-26 PROCEDURE — 81025 URINE PREGNANCY TEST: CPT | Mod: RHCUB | Performed by: NURSE PRACTITIONER

## 2022-07-26 PROCEDURE — 1159F MED LIST DOCD IN RCRD: CPT | Mod: CPTII,,, | Performed by: NURSE PRACTITIONER

## 2022-07-26 NOTE — PROGRESS NOTES
Farrah Bradford NP   [facility]  [address] [address] [place]     PATIENT NAME: Alida Tompkins  : 2000  DATE: 22  MRN: [id number]      Billing Provider: Farrah Bradford NP  Level of Service: MI OFFICE/OUTPT VISIT, EST, LEVL III, 20-29 MIN  Patient PCP Information     Provider PCP Type    Farrah Bradford NP General          Reason for Visit / Chief Complaint: Follow-up (Pt states she gave birth 6 months ago,  was taking the depo inj. Before she got pregnant.  Would like to start back on Depo inj. For birth control.)       Update PCP  Update Chief Complaint         History of Present Illness / Problem Focused Workflow     Alida Tompkins presents to the clinic pt has not had menses in 2 months, has 6 month old baby and wants to start back on depo injection, has not had pap since having baby, infomred that she needed this before depo started, pt in agreement. Also wants A1c checked due to having gestational diabetes      Review of Systems     Review of Systems   Constitutional: Negative for chills, fatigue and fever.   HENT: Negative for nasal congestion, ear pain, facial swelling, hearing loss, mouth dryness, mouth sores, postnasal drip, rhinorrhea, sinus pressure/congestion and goiter.    Eyes: Negative for discharge and itching.   Respiratory: Negative for cough, shortness of breath and wheezing.    Cardiovascular: Negative for chest pain and leg swelling.   Gastrointestinal: Negative for abdominal pain, change in bowel habit and change in bowel habit.   Genitourinary: Positive for menstrual irregularity and menstrual problem. Negative for difficulty urinating, dysuria, enuresis, frequency, hematuria and urgency.   Neurological: Negative for dizziness, vertigo, syncope, weakness and headaches.   Psychiatric/Behavioral: Negative for decreased concentration.        Medical / Social / Family History     Past Medical History:   Diagnosis Date    Gestational diabetes mellitus (GDM) in third trimester 11/15/2021  "   Gestational diabetes mellitus (GDM) in third trimester 11/15/2021    Gestational hypertension, third trimester 11/15/2021    Herpes simplex virus (HSV) infection        Past Surgical History:   Procedure Laterality Date     SECTION N/A 2022    Procedure:  SECTION;  Surgeon: Michelle Cuadra MD;  Location: [facility] L&D;  Service: OB/GYN;  Laterality: N/A;       Social History  Ms.  reports that she has quit smoking. She has never used smokeless tobacco. She reports previous alcohol use. She reports that she does not use drugs.    Family History  Ms.'s family history includes Diabetes in her mother; Hypertension in her brother, father, and mother; No Known Problems in her brother and daughter.    Medications and Allergies     Medications  No outpatient medications have been marked as taking for the 22 encounter (Office Visit) with Farrah Bradford NP.       Allergies  Review of patient's allergies indicates:  No Known Allergies    Physical Examination     Vitals:    22 1429   BP: 124/80   BP Location: Left arm   Patient Position: Sitting   BP Method: Medium (Manual)   Pulse: 82   Resp: 20   Temp: 98.5 °F (36.9 °C)   TempSrc: Oral   SpO2: 98%   Weight: 103.1 kg (227 lb 6 oz)   Height: 5' 5" (1.651 m)      Physical Exam  Constitutional:       Appearance: Normal appearance.   HENT:      Head: Normocephalic.      Right Ear: Tympanic membrane, ear canal and external ear normal.      Left Ear: Tympanic membrane, ear canal and external ear normal.      Nose: Nose normal.      Mouth/Throat:      Mouth: Mucous membranes are moist.      Pharynx: Oropharynx is clear.   Eyes:      Extraocular Movements: Extraocular movements intact.      Conjunctiva/sclera: Conjunctivae normal.      Pupils: Pupils are equal, round, and reactive to light.   Cardiovascular:      Rate and Rhythm: Normal rate and regular rhythm.      Pulses: Normal pulses.      Heart sounds: Normal heart sounds.   Pulmonary: "      Effort: Pulmonary effort is normal.      Breath sounds: Normal breath sounds.   Abdominal:      General: Bowel sounds are normal.      Palpations: Abdomen is soft.   Musculoskeletal:         General: Normal range of motion.   Skin:     General: Skin is warm and dry.      Capillary Refill: Capillary refill takes less than 2 seconds.   Neurological:      General: No focal deficit present.      Mental Status: She is alert and oriented to person, place, and time.   Psychiatric:         Mood and Affect: Mood normal.         Behavior: Behavior normal.          Assessment and Plan (including Health Maintenance)      Problem List  Smart Sets  Document Outside HM   :    Plan: schedule pap smear, for Monday, return to [facility] as needed. And as scheduled    Elevated blood sugar    Hx of gestational diabetes mellitus, not currently pregnant  -     Hemoglobin A1C; Future; Expected date: 07/26/2022    Amenorrhea  -     POCT urine pregnancy            Health Maintenance Due   Topic Date Due    Hepatitis C Screening  Never done    Lipid Panel  Never done    COVID-19 Vaccine (1) Never done    HPV Vaccines (1 - 2-dose series) Never done    Chlamydia Screening  Never done    TETANUS VACCINE  Never done    Pap Smear  Never done       Problem List Items Addressed This Visit        Renal/    Amenorrhea    Relevant Orders    POCT urine pregnancy (Completed)       Endocrine    Elevated blood sugar - Primary    Hx of gestational diabetes mellitus, not currently pregnant    Relevant Orders    Hemoglobin A1C            Health Maintenance Topics with due status: Not Due       Topic Last Completion Date    Influenza Vaccine Not Due       Procedures     Future Appointments   Date Time Provider Department Center   8/1/2022  3:00 PM Farrah Bradford NP [facility]        Follow up for monday for pap smear.       Signature:  Farrah Bradford NP    Date of encounter: 7/26/22

## 2022-07-27 LAB
EST. AVERAGE GLUCOSE BLD GHB EST-MCNC: 134 MG/DL
HBA1C MFR BLD HPLC: 6.6 % (ref 4.5–6.6)

## 2022-08-15 ENCOUNTER — OFFICE VISIT (OUTPATIENT)
Dept: FAMILY MEDICINE | Facility: CLINIC | Age: 22
End: 2022-08-15
Payer: MEDICAID

## 2022-08-15 VITALS
DIASTOLIC BLOOD PRESSURE: 74 MMHG | OXYGEN SATURATION: 99 % | TEMPERATURE: 98 F | HEIGHT: 65 IN | BODY MASS INDEX: 37.15 KG/M2 | SYSTOLIC BLOOD PRESSURE: 122 MMHG | RESPIRATION RATE: 20 BRPM | HEART RATE: 96 BPM | WEIGHT: 223 LBS

## 2022-08-15 DIAGNOSIS — Z00.00 VISIT FOR PREVENTIVE HEALTH EXAMINATION: Primary | ICD-10-CM

## 2022-08-15 DIAGNOSIS — Z30.015 ENCOUNTER FOR INITIAL PRESCRIPTION OF VAGINAL RING HORMONAL CONTRACEPTIVE: ICD-10-CM

## 2022-08-15 DIAGNOSIS — Z01.419 ENCOUNTER FOR CERVICAL PAP SMEAR WITH PELVIC EXAM: ICD-10-CM

## 2022-08-15 PROCEDURE — 3008F BODY MASS INDEX DOCD: CPT | Mod: CPTII,,, | Performed by: NURSE PRACTITIONER

## 2022-08-15 PROCEDURE — 3078F PR MOST RECENT DIASTOLIC BLOOD PRESSURE < 80 MM HG: ICD-10-PCS | Mod: CPTII,,, | Performed by: NURSE PRACTITIONER

## 2022-08-15 PROCEDURE — 99395 PREV VISIT EST AGE 18-39: CPT | Mod: ,,, | Performed by: NURSE PRACTITIONER

## 2022-08-15 PROCEDURE — 3074F PR MOST RECENT SYSTOLIC BLOOD PRESSURE < 130 MM HG: ICD-10-PCS | Mod: CPTII,,, | Performed by: NURSE PRACTITIONER

## 2022-08-15 PROCEDURE — 99395 PR PREVENTIVE VISIT,EST,18-39: ICD-10-PCS | Mod: ,,, | Performed by: NURSE PRACTITIONER

## 2022-08-15 PROCEDURE — 87591 CHLAMYDIA/GC, PCR (THINPREP): ICD-10-PCS | Mod: ,,, | Performed by: CLINICAL MEDICAL LABORATORY

## 2022-08-15 PROCEDURE — 87491 CHLMYD TRACH DNA AMP PROBE: CPT | Mod: ,,, | Performed by: CLINICAL MEDICAL LABORATORY

## 2022-08-15 PROCEDURE — 87624 HPV HI-RISK TYP POOLED RSLT: CPT | Mod: ,,, | Performed by: CLINICAL MEDICAL LABORATORY

## 2022-08-15 PROCEDURE — 87591 N.GONORRHOEAE DNA AMP PROB: CPT | Mod: ,,, | Performed by: CLINICAL MEDICAL LABORATORY

## 2022-08-15 PROCEDURE — 3074F SYST BP LT 130 MM HG: CPT | Mod: CPTII,,, | Performed by: NURSE PRACTITIONER

## 2022-08-15 PROCEDURE — 3008F PR BODY MASS INDEX (BMI) DOCUMENTED: ICD-10-PCS | Mod: CPTII,,, | Performed by: NURSE PRACTITIONER

## 2022-08-15 PROCEDURE — 3078F DIAST BP <80 MM HG: CPT | Mod: CPTII,,, | Performed by: NURSE PRACTITIONER

## 2022-08-15 PROCEDURE — 88142 CYTOPATH C/V THIN LAYER: CPT | Mod: GCY | Performed by: NURSE PRACTITIONER

## 2022-08-15 PROCEDURE — 87491 CHLAMYDIA/GC, PCR (THINPREP): ICD-10-PCS | Mod: ,,, | Performed by: CLINICAL MEDICAL LABORATORY

## 2022-08-15 PROCEDURE — 87624 HUMAN PAPILLOMAVIRUS (HPV): ICD-10-PCS | Mod: ,,, | Performed by: CLINICAL MEDICAL LABORATORY

## 2022-08-15 PROCEDURE — 1159F PR MEDICATION LIST DOCUMENTED IN MEDICAL RECORD: ICD-10-PCS | Mod: CPTII,,, | Performed by: NURSE PRACTITIONER

## 2022-08-15 PROCEDURE — 3044F PR MOST RECENT HEMOGLOBIN A1C LEVEL <7.0%: ICD-10-PCS | Mod: CPTII,,, | Performed by: NURSE PRACTITIONER

## 2022-08-15 PROCEDURE — 1159F MED LIST DOCD IN RCRD: CPT | Mod: CPTII,,, | Performed by: NURSE PRACTITIONER

## 2022-08-15 PROCEDURE — 3044F HG A1C LEVEL LT 7.0%: CPT | Mod: CPTII,,, | Performed by: NURSE PRACTITIONER

## 2022-08-15 RX ORDER — ETONOGESTREL AND ETHINYL ESTRADIOL VAGINAL RING .015; .12 MG/D; MG/D
1 RING VAGINAL
Qty: 3 EACH | Refills: 3 | Status: SHIPPED | OUTPATIENT
Start: 2022-08-15 | End: 2022-09-19

## 2022-08-15 NOTE — PROGRESS NOTES
Farrah Bradford NP   [facility]  [address]  [place]     PATIENT NAME: Alida Tompkins  : 2000  DATE: 8/15/22  MRN: [id number]      Billing Provider: Farrah Bradford NP  Level of Service:   Patient PCP Information     Provider PCP Type    Farrah Bradford NP General          Reason for Visit / Chief Complaint: Wellness exam (Here for Wellness visit / papsmear)       Update PCP  Update Chief Complaint         History of Present Illness / Problem Focused Workflow     Alida Tompkins presents to the clinic here for wellness vist/pap smear, voices n/c, wants to start on another form of contraception      Review of Systems     Review of Systems   Constitutional: Negative for chills, fatigue and fever.   HENT: Negative for nasal congestion, ear pain, facial swelling, hearing loss, mouth dryness, mouth sores, postnasal drip, rhinorrhea, sinus pressure/congestion and goiter.    Eyes: Negative for discharge and itching.   Respiratory: Negative for cough, shortness of breath and wheezing.    Cardiovascular: Negative for chest pain and leg swelling.   Gastrointestinal: Negative for abdominal pain, change in bowel habit and change in bowel habit.   Genitourinary: Negative for difficulty urinating, dysuria, enuresis, frequency, hematuria and urgency.   Neurological: Negative for dizziness, vertigo, syncope, weakness and headaches.   Psychiatric/Behavioral: Negative for decreased concentration.        Medical / Social / Family History     Past Medical History:   Diagnosis Date    Gestational diabetes mellitus (GDM) in third trimester 11/15/2021    Gestational diabetes mellitus (GDM) in third trimester 11/15/2021    Gestational hypertension, third trimester 11/15/2021    Herpes simplex virus (HSV) infection        Past Surgical History:   Procedure Laterality Date     SECTION N/A 2022    Procedure:  SECTION;  Surgeon: Michelle Cuadra MD;  Location: [facility] L&D;  Service: OB/GYN;   "Laterality: N/A;       Social History  Ms.  reports that she has quit smoking. She has never used smokeless tobacco. She reports previous alcohol use. She reports that she does not use drugs.    Family History  Ms.'s family history includes Diabetes in her mother; Hypertension in her brother, father, and mother; No Known Problems in her brother and daughter.    Medications and Allergies     Medications  No outpatient medications have been marked as taking for the 8/15/22 encounter (Office Visit) with Farrah Bradford NP.       Allergies  Review of patient's allergies indicates:  No Known Allergies    Physical Examination     Vitals:    08/15/22 1438   BP: 122/74   BP Location: Left arm   Patient Position: Sitting   BP Method: Medium (Manual)   Pulse: 96   Resp: 20   Temp: 97.7 °F (36.5 °C)   TempSrc: Oral   SpO2: 99%   Weight: 101.2 kg (223 lb)   Height: 5' 5" (1.651 m)      Physical Exam  Constitutional:       Appearance: Normal appearance.   HENT:      Head: Normocephalic.      Right Ear: Tympanic membrane, ear canal and external ear normal.      Left Ear: Tympanic membrane, ear canal and external ear normal.      Nose: Nose normal.      Mouth/Throat:      Mouth: Mucous membranes are moist.      Pharynx: Oropharynx is clear.   Eyes:      Extraocular Movements: Extraocular movements intact.      Conjunctiva/sclera: Conjunctivae normal.      Pupils: Pupils are equal, round, and reactive to light.   Cardiovascular:      Rate and Rhythm: Normal rate and regular rhythm.      Pulses: Normal pulses.      Heart sounds: Normal heart sounds.   Pulmonary:      Effort: Pulmonary effort is normal.      Breath sounds: Normal breath sounds.   Abdominal:      General: Bowel sounds are normal.      Palpations: Abdomen is soft.      Hernia: There is no hernia in the left inguinal area or right inguinal area.   Genitourinary:     General: Normal vulva.      Exam position: Knee-chest position.      Luis Miguel stage (genital): 5.      " Labia:         Right: No rash, tenderness, lesion or injury.         Left: No rash, tenderness, lesion or injury.       Urethra: No prolapse, urethral pain or urethral swelling.      Vagina: No vaginal discharge.      Rectum: Normal.      Comments: Pap smear obtained and sent to lab  Musculoskeletal:         General: Normal range of motion.   Lymphadenopathy:      Lower Body: No right inguinal adenopathy. No left inguinal adenopathy.   Skin:     General: Skin is warm and dry.      Capillary Refill: Capillary refill takes less than 2 seconds.   Neurological:      General: No focal deficit present.      Mental Status: She is alert and oriented to person, place, and time.   Psychiatric:         Mood and Affect: Mood normal.         Behavior: Behavior normal.          Assessment and Plan (including Health Maintenance)      Problem List  Smart Sets  Document Outside HM   :    Plan: cont meds as ordered, reaturn to clinic as needed, use anoterh form of contraceptive up to 2 weeks after inserting ring.     Encounter for cervical Pap smear with pelvic exam  -     ThinPrep Pap Test; Future; Expected date: 08/15/2022    Encounter for initial prescription of vaginal ring hormonal contraceptive  -     etonogestreL-ethinyl estradioL (NUVARING) 0.12-0.015 mg/24 hr vaginal ring; Place 1 each vaginally every 28 days.  Dispense: 3 each; Refill: 3            Health Maintenance Due   Topic Date Due    Hepatitis C Screening  Never done    Lipid Panel  Never done    COVID-19 Vaccine (1) Never done    HPV Vaccines (1 - 2-dose series) Never done    Chlamydia Screening  Never done    TETANUS VACCINE  Never done    Pap Smear  Never done       Problem List Items Addressed This Visit    None     Visit Diagnoses     Encounter for cervical Pap smear with pelvic exam    -  Primary    Relevant Orders    ThinPrep Pap Test    Encounter for initial prescription of vaginal ring hormonal contraceptive        Relevant Medications     etonogestreL-ethinyl estradioL (NUVARING) 0.12-0.015 mg/24 hr vaginal ring            Health Maintenance Topics with due status: Not Due       Topic Last Completion Date    Influenza Vaccine Not Due       Procedures     Future Appointments   Date Time Provider Department Center   8/16/2023  1:45 PM Farrah Bradford NP [facility]        No follow-ups on file.       Signature:  Farrah Bradford NP    Date of encounter: 8/15/22

## 2022-08-17 LAB
GH SERPL-MCNC: NORMAL NG/ML
INSULIN SERPL-ACNC: NORMAL U[IU]/ML
LAB AP CLINICAL INFORMATION: NORMAL
LAB AP GYN INTERPRETATION: NEGATIVE
LAB AP PAP DISCLAIMER COMMENTS: NORMAL
RENIN PLAS-CCNC: NORMAL NG/ML/H

## 2022-08-19 LAB
CHLAMYDIA BY PCR: NEGATIVE
HPV 16: NEGATIVE
HPV 18: NEGATIVE
HPV OTHER: NEGATIVE
N. GONORRHOEAE (GC) BY PCR: NEGATIVE

## 2022-09-19 ENCOUNTER — OFFICE VISIT (OUTPATIENT)
Dept: FAMILY MEDICINE | Facility: CLINIC | Age: 22
End: 2022-09-19
Payer: MEDICAID

## 2022-09-19 VITALS
SYSTOLIC BLOOD PRESSURE: 126 MMHG | DIASTOLIC BLOOD PRESSURE: 84 MMHG | HEART RATE: 73 BPM | WEIGHT: 223.38 LBS | HEIGHT: 65 IN | RESPIRATION RATE: 20 BRPM | TEMPERATURE: 99 F | BODY MASS INDEX: 37.22 KG/M2 | OXYGEN SATURATION: 99 %

## 2022-09-19 DIAGNOSIS — N76.0 BACTERIAL VAGINOSIS: Primary | ICD-10-CM

## 2022-09-19 DIAGNOSIS — B96.89 BACTERIAL VAGINOSIS: Primary | ICD-10-CM

## 2022-09-19 DIAGNOSIS — N89.8 VAGINAL DISCHARGE: ICD-10-CM

## 2022-09-19 LAB
B-HCG UR QL: NEGATIVE
BILIRUB SERPL-MCNC: NEGATIVE MG/DL
BLOOD URINE, POC: NEGATIVE
COLOR, POC UA: YELLOW
CTP QC/QA: YES
GLUCOSE UR QL STRIP: NEGATIVE
KETONES UR QL STRIP: NEGATIVE
LEUKOCYTE ESTERASE URINE, POC: NEGATIVE
NITRITE, POC UA: NEGATIVE
PH, POC UA: 5.5
PROTEIN, POC: NEGATIVE
SPECIFIC GRAVITY, POC UA: >1.03
UROBILINOGEN, POC UA: 0.2

## 2022-09-19 PROCEDURE — 3079F DIAST BP 80-89 MM HG: CPT | Mod: CPTII,,, | Performed by: NURSE PRACTITIONER

## 2022-09-19 PROCEDURE — 3044F PR MOST RECENT HEMOGLOBIN A1C LEVEL <7.0%: ICD-10-PCS | Mod: CPTII,,, | Performed by: NURSE PRACTITIONER

## 2022-09-19 PROCEDURE — 1159F PR MEDICATION LIST DOCUMENTED IN MEDICAL RECORD: ICD-10-PCS | Mod: CPTII,,, | Performed by: NURSE PRACTITIONER

## 2022-09-19 PROCEDURE — 87210 WET PREP, GENITAL: ICD-10-PCS | Mod: ,,, | Performed by: CLINICAL MEDICAL LABORATORY

## 2022-09-19 PROCEDURE — 99214 PR OFFICE/OUTPT VISIT, EST, LEVL IV, 30-39 MIN: ICD-10-PCS | Mod: ,,, | Performed by: NURSE PRACTITIONER

## 2022-09-19 PROCEDURE — 87210 SMEAR WET MOUNT SALINE/INK: CPT | Mod: ,,, | Performed by: CLINICAL MEDICAL LABORATORY

## 2022-09-19 PROCEDURE — 81025 URINE PREGNANCY TEST: CPT | Mod: RHCUB | Performed by: NURSE PRACTITIONER

## 2022-09-19 PROCEDURE — 3074F PR MOST RECENT SYSTOLIC BLOOD PRESSURE < 130 MM HG: ICD-10-PCS | Mod: CPTII,,, | Performed by: NURSE PRACTITIONER

## 2022-09-19 PROCEDURE — 3008F BODY MASS INDEX DOCD: CPT | Mod: CPTII,,, | Performed by: NURSE PRACTITIONER

## 2022-09-19 PROCEDURE — 3079F PR MOST RECENT DIASTOLIC BLOOD PRESSURE 80-89 MM HG: ICD-10-PCS | Mod: CPTII,,, | Performed by: NURSE PRACTITIONER

## 2022-09-19 PROCEDURE — 3008F PR BODY MASS INDEX (BMI) DOCUMENTED: ICD-10-PCS | Mod: CPTII,,, | Performed by: NURSE PRACTITIONER

## 2022-09-19 PROCEDURE — 3044F HG A1C LEVEL LT 7.0%: CPT | Mod: CPTII,,, | Performed by: NURSE PRACTITIONER

## 2022-09-19 PROCEDURE — 81003 URINALYSIS AUTO W/O SCOPE: CPT | Mod: RHCUB | Performed by: NURSE PRACTITIONER

## 2022-09-19 PROCEDURE — 1159F MED LIST DOCD IN RCRD: CPT | Mod: CPTII,,, | Performed by: NURSE PRACTITIONER

## 2022-09-19 PROCEDURE — 99214 OFFICE O/P EST MOD 30 MIN: CPT | Mod: ,,, | Performed by: NURSE PRACTITIONER

## 2022-09-19 PROCEDURE — 3074F SYST BP LT 130 MM HG: CPT | Mod: CPTII,,, | Performed by: NURSE PRACTITIONER

## 2022-09-19 RX ORDER — METRONIDAZOLE 500 MG/1
500 TABLET ORAL EVERY 12 HOURS
Qty: 14 TABLET | Refills: 0 | Status: SHIPPED | OUTPATIENT
Start: 2022-09-19 | End: 2023-07-12 | Stop reason: ALTCHOICE

## 2022-09-19 NOTE — PROGRESS NOTES
Farrah Bradford NP   [facility]  [address] [address]  [place]     PATIENT NAME: Alida Tompkins  : 2000  DATE: 22  MRN: [id number]      Billing Provider: Farrah Bradford NP  Level of Service:   Patient PCP Information       Provider PCP Type    Farrah Bradford NP General            Reason for Visit / Chief Complaint: Vaginal Discharge (White vaginal discharge w/ fishy smell. Last intercourse 2 weeks ago. Noticed pink tinged discharge and itching thereafter. Treated for yeast infection w/ 1 day Monostat w/o relief. )       Update PCP  Update Chief Complaint         History of Present Illness / Problem Focused Workflow     Alida Tompkins presents to the clinic c/o vaginal discharge, , white with fish smell, last intercourse 2 weeks ago with old boyfriend, noticed pink ginged dischar and itchering thereafter, treated for yeast infection with 1 day monostat without relief      Review of Systems     Review of Systems   Constitutional:  Negative for chills, fatigue and fever.   HENT:  Negative for nasal congestion, ear pain, facial swelling, hearing loss, mouth dryness, mouth sores, postnasal drip, rhinorrhea, sinus pressure/congestion and goiter.    Eyes:  Negative for discharge and itching.   Respiratory:  Negative for cough, shortness of breath and wheezing.    Cardiovascular:  Negative for chest pain and leg swelling.   Gastrointestinal:  Negative for abdominal pain, change in bowel habit and change in bowel habit.   Genitourinary:  Positive for vaginal discharge. Negative for difficulty urinating, dysuria, enuresis, frequency, hematuria and urgency.   Neurological:  Negative for dizziness, vertigo, syncope, weakness and headaches.   Psychiatric/Behavioral:  Negative for decreased concentration.       Medical / Social / Family History     Past Medical History:   Diagnosis Date    Gestational diabetes mellitus (GDM) in third trimester 11/15/2021    Gestational diabetes mellitus (GDM) in third trimester  "11/15/2021    Gestational hypertension, third trimester 11/15/2021    Herpes simplex virus (HSV) infection        Past Surgical History:   Procedure Laterality Date     SECTION N/A 2022    Procedure:  SECTION;  Surgeon: Michelle Cuadra MD;  Location: [facility] L&D;  Service: OB/GYN;  Laterality: N/A;       Social History  Ms.  reports that she has quit smoking. She has never used smokeless tobacco. She reports that she does not currently use alcohol. She reports that she does not use drugs.    Family History  Ms.'s family history includes Diabetes in her mother; Hypertension in her brother, father, and mother; No Known Problems in her brother and daughter.    Medications and Allergies     Medications  No outpatient medications have been marked as taking for the 22 encounter (Office Visit) with Farrah Bradford NP.       Allergies  Review of patient's allergies indicates:  No Known Allergies    Physical Examination     Vitals:    22 1337   BP: 126/84   BP Location: Right arm   Patient Position: Sitting   Pulse: 73   Resp: 20   Temp: 99.1 °F (37.3 °C)   TempSrc: Oral   SpO2: 99%   Weight: 101.3 kg (223 lb 6.4 oz)   Height: 5' 5" (1.651 m)      Physical Exam  Constitutional:       Appearance: Normal appearance.   HENT:      Head: Normocephalic.      Right Ear: Tympanic membrane, ear canal and external ear normal.      Left Ear: Tympanic membrane, ear canal and external ear normal.      Nose: Nose normal.      Mouth/Throat:      Mouth: Mucous membranes are moist.      Pharynx: Oropharynx is clear.   Eyes:      Extraocular Movements: Extraocular movements intact.      Conjunctiva/sclera: Conjunctivae normal.      Pupils: Pupils are equal, round, and reactive to light.   Cardiovascular:      Rate and Rhythm: Normal rate and regular rhythm.      Pulses: Normal pulses.      Heart sounds: Normal heart sounds.   Pulmonary:      Effort: Pulmonary effort is normal.      Breath sounds: Normal " breath sounds.   Abdominal:      General: Bowel sounds are normal.      Palpations: Abdomen is soft.   Genitourinary:     Vagina: Vaginal discharge (stes it is white with fish odor, occ pink tinged and a lot of vag itching) present.      Comments: Deferred, obtained own wet preg/ if not better in a week, will return for pelvic exam  Musculoskeletal:         General: Normal range of motion.      Cervical back: Normal range of motion and neck supple.   Skin:     General: Skin is warm and dry.      Capillary Refill: Capillary refill takes less than 2 seconds.   Neurological:      General: No focal deficit present.      Mental Status: She is alert and oriented to person, place, and time.   Psychiatric:         Mood and Affect: Mood normal.         Behavior: Behavior normal.        Assessment and Plan (including Health Maintenance)      Problem List  Smart Sets  Document Outside HM   :    Plan: instructed to use protection while having sex, meds as ordered, if not better in 1 week, return to clinic for pelvic exam    Vaginal discharge  -     Wet Prep, Genital; Future; Expected date: 09/19/2022  -     POCT URINALYSIS W/O SCOPE  -     POCT urine pregnancy            Health Maintenance Due   Topic Date Due    Hepatitis C Screening  Never done    Lipid Panel  Never done    COVID-19 Vaccine (1) Never done    HPV Vaccines (1 - 2-dose series) Never done    Chlamydia Screening  Never done    TETANUS VACCINE  Never done    Influenza Vaccine (1) Never done       Problem List Items Addressed This Visit    None  Visit Diagnoses       Vaginal discharge    -  Primary    Relevant Orders    Wet Prep, Genital    POCT URINALYSIS W/O SCOPE (Completed)    POCT urine pregnancy              Health Maintenance Topics with due status: Not Due       Topic Last Completion Date    Pap Smear 08/15/2022       Procedures     Future Appointments   Date Time Provider Department Center   9/19/2022  2:30 PM Farrah Bradford NP [facility]    2/15/2023  9:00 AM Farrah Bradford [place] [facility] [place]   8/16/2023  1:45 PM [facility] [place]        No follow-ups on file.       Signature:  Farrah Bradford NP    Date of encounter: 9/19/22

## 2022-09-20 LAB
BACTERIA VAG QL WET PREP: ABNORMAL /HPF
CLUE CELLS VAG QL WET PREP: ABNORMAL /HPF
RBC #/AREA VAG WET PREP: ABNORMAL /HPF
SQUAMOUS EPITHELIALS WET WOUNT, GENITAL: ABNORMAL /HPF
T VAGINALIS VAG QL WET PREP: ABNORMAL /HPF
WBC CLUMPS WET MOUNT, GENITAL: ABNORMAL /HPF
WBC VAG QL WET PREP: ABNORMAL /HPF
YEAST VAG QL WET PREP: ABNORMAL /HPF

## 2022-11-14 PROBLEM — Z00.00 VISIT FOR PREVENTIVE HEALTH EXAMINATION: Status: RESOLVED | Noted: 2022-08-15 | Resolved: 2022-11-14

## 2023-03-08 ENCOUNTER — HOSPITAL ENCOUNTER (OUTPATIENT)
Dept: RADIOLOGY | Facility: HOSPITAL | Age: 23
Discharge: HOME | End: 2023-03-08
Attending: INTERNAL MEDICINE
Payer: MEDICAID

## 2023-03-08 DIAGNOSIS — R79.89 ABNORMAL LFTS (LIVER FUNCTION TESTS): ICD-10-CM

## 2023-03-08 PROCEDURE — 76705 ECHO EXAM OF ABDOMEN: CPT | Mod: TC

## 2023-04-17 ENCOUNTER — HOSPITAL ENCOUNTER (EMERGENCY)
Facility: HOSPITAL | Age: 23
Discharge: HOME | End: 2023-04-17
Attending: FAMILY MEDICINE
Payer: MEDICAID

## 2023-04-17 VITALS
HEIGHT: 63 IN | BODY MASS INDEX: 38.98 KG/M2 | HEART RATE: 134 BPM | RESPIRATION RATE: 16 BRPM | TEMPERATURE: 98 F | OXYGEN SATURATION: 98 % | WEIGHT: 220 LBS

## 2023-04-17 DIAGNOSIS — S62.339A BOXER'S FRACTURE, CLOSED, INITIAL ENCOUNTER: Primary | ICD-10-CM

## 2023-04-17 PROCEDURE — 99284 PR EMERGENCY DEPT VISIT,LEVEL IV: ICD-10-PCS | Mod: 25,,, | Performed by: FAMILY MEDICINE

## 2023-04-17 PROCEDURE — 99284 EMERGENCY DEPT VISIT MOD MDM: CPT

## 2023-04-17 PROCEDURE — 29125 APPL SHORT ARM SPLINT STATIC: CPT | Mod: 54,LT,, | Performed by: FAMILY MEDICINE

## 2023-04-17 PROCEDURE — 29125 PR APPLY FOREARM SPLINT,STATIC: ICD-10-PCS | Mod: 54,LT,, | Performed by: FAMILY MEDICINE

## 2023-04-17 PROCEDURE — 90471 IMMUNIZATION ADMIN: CPT | Performed by: FAMILY MEDICINE

## 2023-04-17 PROCEDURE — 90715 TDAP VACCINE 7 YRS/> IM: CPT | Performed by: FAMILY MEDICINE

## 2023-04-17 PROCEDURE — 96372 THER/PROPH/DIAG INJ SC/IM: CPT | Performed by: FAMILY MEDICINE

## 2023-04-17 PROCEDURE — 63600175 PHARM REV CODE 636 W HCPCS: Performed by: FAMILY MEDICINE

## 2023-04-17 PROCEDURE — 99284 EMERGENCY DEPT VISIT MOD MDM: CPT | Mod: 25,,, | Performed by: FAMILY MEDICINE

## 2023-04-17 RX ORDER — ATENOLOL 25 MG/1
25 TABLET ORAL DAILY
COMMUNITY

## 2023-04-17 RX ORDER — MORPHINE SULFATE 8 MG/ML
2 INJECTION INTRAMUSCULAR; INTRAVENOUS; SUBCUTANEOUS
Status: COMPLETED | OUTPATIENT
Start: 2023-04-17 | End: 2023-04-17

## 2023-04-17 RX ORDER — ONDANSETRON 2 MG/ML
4 INJECTION INTRAMUSCULAR; INTRAVENOUS
Status: COMPLETED | OUTPATIENT
Start: 2023-04-17 | End: 2023-04-17

## 2023-04-17 RX ADMIN — ONDANSETRON 4 MG: 2 INJECTION INTRAMUSCULAR; INTRAVENOUS at 01:04

## 2023-04-17 RX ADMIN — MORPHINE SULFATE 2 MG: 8 INJECTION INTRAVENOUS at 01:04

## 2023-04-17 RX ADMIN — TETANUS TOXOID, REDUCED DIPHTHERIA TOXOID AND ACELLULAR PERTUSSIS VACCINE, ADSORBED 0.5 ML: 5; 2.5; 8; 8; 2.5 SUSPENSION INTRAMUSCULAR at 11:04

## 2023-04-17 NOTE — ED TRIAGE NOTES
Received patient crying, right hand with noted edema to 5th knuckle with open scratches noted to third, fourth and fifth digits. Patient stated she hit a wall with her fist approximately 25 minutes prior to arrival

## 2023-04-17 NOTE — ED PROVIDER NOTES
Encounter Date: 2023       History     Chief Complaint   Patient presents with    Hand Pain     left     PT HERE WITH LEFT HAND PAIN.     The history is provided by the patient.   Review of patient's allergies indicates:  No Known Allergies  Past Medical History:   Diagnosis Date    Gestational diabetes mellitus (GDM) in third trimester 11/15/2021    Gestational diabetes mellitus (GDM) in third trimester 11/15/2021    Gestational hypertension, third trimester 11/15/2021    Herpes simplex virus (HSV) infection      Past Surgical History:   Procedure Laterality Date     SECTION N/A 2022    Procedure:  SECTION;  Surgeon: Michelle Cuadra MD;  Location: [facility] L&D;  Service: OB/GYN;  Laterality: N/A;     Family History   Problem Relation Age of Onset    Hypertension Father     Diabetes Mother     Hypertension Mother     No Known Problems Brother     Hypertension Brother     No Known Problems Daughter      Social History     Tobacco Use    Smoking status: Former    Smokeless tobacco: Never   Substance Use Topics    Alcohol use: Not Currently    Drug use: Never     Review of Systems   Musculoskeletal:         LEFT HAND PAIN, LIEFT FINGER SWELLING     Physical Exam     Initial Vitals [23 1120]   BP Pulse Resp Temp SpO2   -- (!) 134 (!) 180 98.1 °F (36.7 °C) 98 %      MAP       --         Physical Exam    Constitutional: She appears well-developed and well-nourished.   HENT:   Head: Normocephalic and atraumatic.   Eyes: EOM are normal. Pupils are equal, round, and reactive to light.   Neck: Neck supple.   Normal range of motion.  Cardiovascular:  Normal rate and regular rhythm.           Pulmonary/Chest: Breath sounds normal.   Abdominal: Abdomen is soft.   Musculoskeletal:      Cervical back: Normal range of motion and neck supple.      Comments: SWELLING LEFT SMALL FINGER, ABRASIONS LEFT KNUCKLES     Neurological: She is alert and oriented to person, place, and time.   Psychiatric:  She has a normal mood and affect. Thought content normal.       Medical Screening Exam   See Full Note    ED Course   Procedures  Labs Reviewed - No data to display       Imaging Results              X-Ray Hand 3 view Left (Final result)  Result time 04/17/23 11:53:50      Final result by Soren Lopez DO (04/17/23 11:53:50)                   Impression:      As above.    Point of Service: [facility]      Electronically signed by: Soren Lopez  Date:    04/17/2023  Time:    11:53               Narrative:    EXAMINATION:  XR HAND COMPLETE 3 VIEW LEFT    CLINICAL HISTORY:  hand pain;    COMPARISON:  None    TECHNIQUE:  Frontal, lateral, and oblique views of the left hand.    FINDINGS:  Acute, mildly displaced fracture involving the distal aspect of the 5th metacarpal with moderate dorsal apex angulation.                                       Medications   Tdap (BOOSTRIX) vaccine injection 0.5 mL (0.5 mLs Intramuscular Given 4/17/23 1131)   morphine injection 2 mg (2 mg Intramuscular Given 4/17/23 1318)   ondansetron injection 4 mg (4 mg Intramuscular Given 4/17/23 1317)     Medical Decision Making:   ED Management:  LEFT HAND PAIN AFTER PUNCHING A WALL.   UNSURE LAST TETANUS VACCINE.  ABRASIONS AND LEFT HAND SWELLING AND PAIN.  BOXERS' FRACTURE LEFT HAND, XRAY  DISCUSSED WITH DR. FABIENNE LEE, ULNAR GUTTER SPLINT, FOLLOW UP WITH HIM IN CLINIC                 Clinical Impression:   Final diagnoses:  [S62.339A] Boxer's fracture, closed, initial encounter (Primary)        ED Disposition Condition    Discharge Stable          ED Prescriptions    None       Follow-up Information       Follow up With Specialties Details Why Contact Info    Fabienne Lee MD Orthopedic Surgery   [address] [place] [id number]  [phone]               Idalia Gray MD  04/17/23 4064

## 2023-04-18 ENCOUNTER — OFFICE VISIT (OUTPATIENT)
Dept: ORTHOPEDICS | Facility: CLINIC | Age: 23
End: 2023-04-18
Payer: MEDICAID

## 2023-04-18 VITALS — WEIGHT: 293 LBS | HEIGHT: 63 IN | BODY MASS INDEX: 51.91 KG/M2

## 2023-04-18 DIAGNOSIS — S62.337A DISPLACED FRACTURE OF NECK OF FIFTH METACARPAL BONE, LEFT HAND, INITIAL ENCOUNTER FOR CLOSED FRACTURE: Primary | ICD-10-CM

## 2023-04-18 PROCEDURE — 99203 OFFICE O/P NEW LOW 30 MIN: CPT | Mod: S$PBB,57,, | Performed by: ORTHOPAEDIC SURGERY

## 2023-04-18 PROCEDURE — 3008F BODY MASS INDEX DOCD: CPT | Mod: CPTII,,, | Performed by: ORTHOPAEDIC SURGERY

## 2023-04-18 PROCEDURE — 26605 TREAT METACARPAL FRACTURE: CPT | Mod: S$PBB,LT,, | Performed by: ORTHOPAEDIC SURGERY

## 2023-04-18 PROCEDURE — 26605 TREAT METACARPAL FRACTURE: CPT | Mod: PBBFAC | Performed by: ORTHOPAEDIC SURGERY

## 2023-04-18 PROCEDURE — 99213 OFFICE O/P EST LOW 20 MIN: CPT | Mod: PBBFAC | Performed by: ORTHOPAEDIC SURGERY

## 2023-04-18 PROCEDURE — 99203 PR OFFICE/OUTPT VISIT, NEW, LEVL III, 30-44 MIN: ICD-10-PCS | Mod: S$PBB,57,, | Performed by: ORTHOPAEDIC SURGERY

## 2023-04-18 PROCEDURE — 26605 PR CLOSED RX METACARPAL FX,MANIP: ICD-10-PCS | Mod: S$PBB,LT,, | Performed by: ORTHOPAEDIC SURGERY

## 2023-04-18 PROCEDURE — 3008F PR BODY MASS INDEX (BMI) DOCUMENTED: ICD-10-PCS | Mod: CPTII,,, | Performed by: ORTHOPAEDIC SURGERY

## 2023-04-18 NOTE — PROGRESS NOTES
"    HPI:   Alida Tompkins is a pleasant 23 y.o. patient who reports to clinic for evaluation of left hand injury. States she hit a wall yesterday, went to [place]'s ED and was referred to ortho.  Diagnosed with a 5th metacarpal fracture placed in a splint     Injury onset and description: hit a wall  Patient's occupation: Homemaker  This is not a work related injury.   This injury has been non-responsive to conservative care. The pain is worse with repetitive use, and strenuous activity is very difficult.  her pain improves with rest.  she rates pain as a  5/10on the Visual Analog Scale.        PAST MEDICAL HISTORY:   Past Medical History:   Diagnosis Date    Gestational diabetes mellitus (GDM) in third trimester 11/15/2021    Gestational diabetes mellitus (GDM) in third trimester 11/15/2021    Gestational hypertension, third trimester 11/15/2021    Herpes simplex virus (HSV) infection      PAST SURGICAL HISTORY:   Past Surgical History:   Procedure Laterality Date     SECTION N/A 2022    Procedure:  SECTION;  Surgeon: Michelle Cuadra MD;  Location: [facility] L&D;  Service: OB/GYN;  Laterality: N/A;     MEDICATIONS:    Current Outpatient Medications:     atenoloL (TENORMIN) 25 MG tablet, Take 25 mg by mouth once daily. Unsure of dose, Disp: , Rfl:     metroNIDAZOLE (FLAGYL) 500 MG tablet, Take 1 tablet (500 mg total) by mouth every 12 (twelve) hours., Disp: 14 tablet, Rfl: 0  ALLERGIES:   Review of patient's allergies indicates:  No Known Allergies      PHYSICAL EXAM:  VITAL SIGNS: Ht 5' 3" (1.6 m)   Wt (!) 190 kg (418 lb 14 oz)   BMI 74.20 kg/m²   General: Well-developed well-nourished 23 y.o. femalein no acute distress;Cardiovascular: Regular rhythm by palpation of distal pulse, normal color and temperature, no concerning varicosities on symptomatic side Lungs: No labored breathing or wheezing appreciated Neuro: Alert and oriented ×3 Psychiatric: well oriented to person, place and time, " demonstrates normal mood and affect Skin: No rashes, lesions or ulcers, normal temperature, turgor, and texture on uninvolved extremity    Ortho/SPM Exam  Inspect her left hand and she has marked tenderness to palpation along the 5th metacarpal head neck region.  Slight deformity is noted.  Moderate ecchymosis.  No breaks in the skin.    IMAGING:  X-Ray Hand 3 view Left    Result Date: 4/17/2023  EXAMINATION: XR HAND COMPLETE 3 VIEW LEFT CLINICAL HISTORY: hand pain; COMPARISON: None TECHNIQUE: Frontal, lateral, and oblique views of the left hand. FINDINGS: Acute, mildly displaced fracture involving the distal aspect of the 5th metacarpal with moderate dorsal apex angulation.     As above. Point of Service: [facility] Electronically signed by: Soren Lopez Date:    04/17/2023 Time:    11:53        ASSESSMENT:      ICD-10-CM ICD-9-CM   1. Displaced fracture of neck of fifth metacarpal bone, left hand, initial encounter for closed fracture  S62.337A 815.04       PLAN:     -Findings and treatment options were discussed with the patient  -All questions answered  She was placed into a short-arm ulnar gutter fiberglass cast today and manipulation was performed utilizing a Jahss maneuver in order to restore length alignment rotation of the fracture.  Recommend immobilization for 4 weeks will see back in 4 weeks which point we will discontinue cast repeat x-rays    There are no Patient Instructions on file for this visit.  No orders of the defined types were placed in this encounter.    Procedures

## 2023-04-20 ENCOUNTER — TELEPHONE (OUTPATIENT)
Dept: EMERGENCY MEDICINE | Facility: HOSPITAL | Age: 23
End: 2023-04-20
Payer: MEDICAID

## 2023-05-01 ENCOUNTER — TELEPHONE (OUTPATIENT)
Dept: ORTHOPEDICS | Facility: CLINIC | Age: 23
End: 2023-05-01
Payer: MEDICAID

## 2023-05-01 DIAGNOSIS — S62.337A DISPLACED FRACTURE OF NECK OF FIFTH METACARPAL BONE, LEFT HAND, INITIAL ENCOUNTER FOR CLOSED FRACTURE: Primary | ICD-10-CM

## 2023-05-01 NOTE — TELEPHONE ENCOUNTER
----- Message from Lorraine Santoyo sent at 5/1/2023  1:58 PM CDT -----  PT RETURNING MISSED CALL FROM NURSE - CALL BACK # [phone]

## 2023-05-10 ENCOUNTER — TELEPHONE (OUTPATIENT)
Dept: ORTHOPEDICS | Facility: CLINIC | Age: 23
End: 2023-05-10
Payer: MEDICAID

## 2023-05-10 NOTE — TELEPHONE ENCOUNTER
I spoke to Alida's mother who said Alida's phone was broken so she left her call back number. I asked about swelling and her mother said she did not mention swelling to her. I let her know we would be happy to take a look for her today if she would like. Her mother said she was headed out of town and wasn't sure she would be back in time to come in today, but asked if she could go somewhere closer to have them check. I told her she could check with her PCP and follow back up with us if she felt something needed to be done to her cast. Her mother said she would relay this information and they would give us a call tomorrow.     ----- Message from Marie Brown sent at 5/10/2023  9:47 AM CDT -----  Patient try to return your call her call back number is [phone]

## 2023-05-11 ENCOUNTER — OFFICE VISIT (OUTPATIENT)
Dept: FAMILY MEDICINE | Facility: CLINIC | Age: 23
End: 2023-05-11
Payer: MEDICAID

## 2023-05-11 VITALS
WEIGHT: 195 LBS | HEART RATE: 70 BPM | BODY MASS INDEX: 34.55 KG/M2 | RESPIRATION RATE: 18 BRPM | HEIGHT: 63 IN | OXYGEN SATURATION: 98 % | DIASTOLIC BLOOD PRESSURE: 85 MMHG | TEMPERATURE: 98 F | SYSTOLIC BLOOD PRESSURE: 135 MMHG

## 2023-05-11 DIAGNOSIS — Z71.1 PHYSICALLY WELL BUT WORRIED: Primary | ICD-10-CM

## 2023-05-11 PROCEDURE — 99212 OFFICE O/P EST SF 10 MIN: CPT | Mod: ,,, | Performed by: NURSE PRACTITIONER

## 2023-05-11 PROCEDURE — 3008F PR BODY MASS INDEX (BMI) DOCUMENTED: ICD-10-PCS | Mod: CPTII,,, | Performed by: NURSE PRACTITIONER

## 2023-05-11 PROCEDURE — 3075F SYST BP GE 130 - 139MM HG: CPT | Mod: CPTII,,, | Performed by: NURSE PRACTITIONER

## 2023-05-11 PROCEDURE — 3008F BODY MASS INDEX DOCD: CPT | Mod: CPTII,,, | Performed by: NURSE PRACTITIONER

## 2023-05-11 PROCEDURE — 1159F PR MEDICATION LIST DOCUMENTED IN MEDICAL RECORD: ICD-10-PCS | Mod: CPTII,,, | Performed by: NURSE PRACTITIONER

## 2023-05-11 PROCEDURE — 99212 PR OFFICE/OUTPT VISIT, EST, LEVL II, 10-19 MIN: ICD-10-PCS | Mod: ,,, | Performed by: NURSE PRACTITIONER

## 2023-05-11 PROCEDURE — 3079F PR MOST RECENT DIASTOLIC BLOOD PRESSURE 80-89 MM HG: ICD-10-PCS | Mod: CPTII,,, | Performed by: NURSE PRACTITIONER

## 2023-05-11 PROCEDURE — 3079F DIAST BP 80-89 MM HG: CPT | Mod: CPTII,,, | Performed by: NURSE PRACTITIONER

## 2023-05-11 PROCEDURE — 1159F MED LIST DOCD IN RCRD: CPT | Mod: CPTII,,, | Performed by: NURSE PRACTITIONER

## 2023-05-11 PROCEDURE — 1160F PR REVIEW ALL MEDS BY PRESCRIBER/CLIN PHARMACIST DOCUMENTED: ICD-10-PCS | Mod: CPTII,,, | Performed by: NURSE PRACTITIONER

## 2023-05-11 PROCEDURE — 1160F RVW MEDS BY RX/DR IN RCRD: CPT | Mod: CPTII,,, | Performed by: NURSE PRACTITIONER

## 2023-05-11 PROCEDURE — 3075F PR MOST RECENT SYSTOLIC BLOOD PRESS GE 130-139MM HG: ICD-10-PCS | Mod: CPTII,,, | Performed by: NURSE PRACTITIONER

## 2023-05-11 RX ORDER — DULAGLUTIDE 0.75 MG/.5ML
0.75 INJECTION, SOLUTION SUBCUTANEOUS
COMMUNITY
Start: 2023-04-07

## 2023-05-11 RX ORDER — ERGOCALCIFEROL 1.25 MG/1
50000 CAPSULE ORAL
COMMUNITY
Start: 2023-04-07

## 2023-05-11 RX ORDER — PHENTERMINE HYDROCHLORIDE 37.5 MG/1
37.5 TABLET ORAL
COMMUNITY
Start: 2023-04-07

## 2023-05-11 NOTE — PROGRESS NOTES
ISRAEL Dhillon   [facility]  [address]  [place] [address]     PATIENT NAME: Alida Tompkins  : 2000  DATE: 23  MRN: [id number]      Billing Provider: ISRAEL Dhillon  Level of Service:   Patient PCP Information       Provider PCP Type    Farrah Bradford NP General            Reason for Visit / Chief Complaint: Numbness (Broke hand 3 weeks ago and now ring finger/pinky finger number. Tingling feeling. Can still move them.)   Health Maintenance Due   Topic Date Due    Hepatitis C Screening  Never done    Lipid Panel  Never done    COVID-19 Vaccine (1) Never done    HPV Vaccines (1 - 2-dose series) Never done          Update PCP  Update Chief Complaint         History of Present Illness / Problem Focused Workflow     Alida Tompkins presents to the clinic finger is tingling feels numb she is worried    Hemoglobin A1C   Date Value Ref Range Status   2022 6.6 4.5 - 6.6 % Final     Comment:       Normal:               <5.7%  Pre-Diabetic:       5.7% to 6.4%  Diabetic:             >6.4%  Diabetic Goal:     <7%   2021 5.5 4.5 - 6.6 % Final     Comment:       Normal:               <5.7%  Pre-Diabetic:       5.7% to 6.4%  Diabetic:             >6.4%  Diabetic Goal:     <7%        CMP  Sodium   Date Value Ref Range Status   2022 138 136 - 145 mmol/L Final     Potassium   Date Value Ref Range Status   2022 4.0 3.5 - 5.1 mmol/L Final     Chloride   Date Value Ref Range Status   2022 103 98 - 107 mmol/L Final     CO2   Date Value Ref Range Status   2022 23 21 - 32 mmol/L Final     Glucose   Date Value Ref Range Status   2022 72 (L) 74 - 106 mg/dL Final     BUN   Date Value Ref Range Status   2022 10 7 - 18 mg/dL Final     Creatinine   Date Value Ref Range Status   2022 0.57 0.55 - 1.02 mg/dL Final     Calcium   Date Value Ref Range Status   2022 8.8 8.5 - 10.1 mg/dL Final     Total Protein   Date Value Ref Range Status    2022 7.5 6.4 - 8.2 g/dL Final     Albumin   Date Value Ref Range Status   2022 2.9 (L) 3.5 - 5.0 g/dL Final     Bilirubin, Total   Date Value Ref Range Status   2022 0.4 0.0 - 1.2 mg/dL Final     Alk Phos   Date Value Ref Range Status   2022 131 (H) 37 - 98 U/L Final     AST   Date Value Ref Range Status   2022 17 15 - 37 U/L Final     ALT   Date Value Ref Range Status   2022 25 13 - 56 U/L Final     Anion Gap   Date Value Ref Range Status   2022 16 7 - 16 mmol/L Final        Lab Results   Component Value Date    WBC 9.03 2022    RBC 3.95 (L) 2022    HGB 9.3 (L) 2022    HCT 29.0 (L) 2022    MCV 73.4 (L) 2022    MCH 23.5 (L) 2022    MCHC 32.1 2022    RDW 14.4 2022     2022    MPV 10.6 2022    LYMPH 12.1 (L) 2022    LYMPH 1.09 2022    MONO 6.8 (H) 2022    EOS 0.04 2022    BASO 0.02 2022    EOSINOPHIL 0.4 (L) 2022    BASOPHIL 0.2 2022        No results found for: CHOL  No results found for: HDL  No results found for: LDLCALC  No results found for: TRIG  No results found for: CHOLHDL     Wt Readings from Last 3 Encounters:   23 0935 88.5 kg (195 lb)   23 1313 (!) 190 kg (418 lb 14 oz)   23 1120 99.8 kg (220 lb)        BP Readings from Last 3 Encounters:   23 135/85   22 126/84   08/15/22 122/74        Review of Systems     Review of Systems   Integumentary:  Negative for rash.   Neurological:  Positive for numbness. Negative for weakness.      Medical / Social / Family History     Past Medical History:   Diagnosis Date    Gestational diabetes mellitus (GDM) in third trimester 11/15/2021    Gestational diabetes mellitus (GDM) in third trimester 11/15/2021    Gestational hypertension, third trimester 11/15/2021    Herpes simplex virus (HSV) infection        Past Surgical History:   Procedure Laterality Date     SECTION N/A 2022     "Procedure:  SECTION;  Surgeon: Michelle Cuadra MD;  [facility]: [facility] L&D;  Service: OB/GYN;  Laterality: N/A;       Social History  Ms.  reports that she has quit smoking. She has never used smokeless tobacco. She reports that she does not currently use alcohol. She reports that she does not use drugs.    Family History  Ms.'s family history includes Diabetes in her mother; Hypertension in her brother, father, and mother; No Known Problems in her brother and daughter.    Medications and Allergies     Medications  Outpatient Medications Marked as Taking for the 23 encounter (Office Visit) with Glory Hernandez [facility]   Medication Sig Dispense Refill    atenoloL (TENORMIN) 25 MG tablet Take 25 mg by mouth once daily. Unsure of dose      ergocalciferol (ERGOCALCIFEROL) 50,000 unit Cap Take 50,000 Units by mouth every 7 days.      metroNIDAZOLE (FLAGYL) 500 MG tablet Take 1 tablet (500 mg total) by mouth every 12 (twelve) hours. 14 tablet 0    phentermine (ADIPEX-P) 37.5 mg tablet Take 37.5 mg by mouth.      TRULICITY 0.75 mg/0.5 mL pen injector Inject 0.75 mg into the skin every 7 days.         Allergies  Review of patient's allergies indicates:  No Known Allergies    Physical Examination     Vitals:    23 0935   BP: 135/85   BP Location: Left arm   Patient Position: Sitting   Pulse: 70   Resp: 18   Temp: 98.3 °F (36.8 °C)   TempSrc: Oral   SpO2: 98%   Weight: 88.5 kg (195 lb)   Height: 5' 3" (1.6 m)      Physical Exam  Vitals and nursing note reviewed.   Constitutional:       Appearance: Normal appearance.   Eyes:      Pupils: Pupils are equal, round, and reactive to light.   Pulmonary:      Effort: Pulmonary effort is normal.   Musculoskeletal:      Left hand: Normal. No tenderness. Normal range of motion. Normal capillary refill.      Comments: Brisk refill no cyanosis swelling and warm to touch   Skin:     Capillary Refill: Capillary refill takes less than 2 seconds.   Neurological: "      Mental Status: She is alert and oriented to person, place, and time.   Psychiatric:         Behavior: Behavior normal.        Assessment and Plan (including Health Maintenance)      Problem List  Smart Sets  Document Outside HM   :    Plan:     Patient Instructions   Reassurance good capillary refill finger warm without swelling keep appointment with ortho next week   Health Maintenance Due   Topic Date Due    Hepatitis C Screening  Never done    Lipid Panel  Never done    COVID-19 Vaccine (1) Never done    HPV Vaccines (1 - 2-dose series) Never done        Health Maintenance Due   Topic Date Due    Hepatitis C Screening  Never done    Lipid Panel  Never done    COVID-19 Vaccine (1) Never done    HPV Vaccines (1 - 2-dose series) Never done       Problem List Items Addressed This Visit    None  Visit Diagnoses       Physically well but worried    -  Primary          Physically well but worried       Health Maintenance Topics with due status: Not Due       Topic Last Completion Date    Pap Smear 08/15/2022    TETANUS VACCINE 04/17/2023    Influenza Vaccine Not Due       Procedures   Health Maintenance Due   Topic Date Due    Hepatitis C Screening  Never done    Lipid Panel  Never done    COVID-19 Vaccine (1) Never done    HPV Vaccines (1 - 2-dose series) Never done        Future Appointments   Date Time Provider Department Center   5/11/2023 10:45 AM ISRAEL Dhillon [facility] TARA [place]   5/16/2023  9:30 AM Shankar Lee MD [facility]   8/16/2023  1:45 PM Farrah Bradford NP [facility]        No follow-ups on file.    Health Maintenance Due   Topic Date Due    Hepatitis C Screening  Never done    Lipid Panel  Never done    COVID-19 Vaccine (1) Never done    HPV Vaccines (1 - 2-dose series) Never done        Signature:  ISRAEL Dhillon    Date of encounter: 5/11/235/11/2023

## 2023-05-11 NOTE — PATIENT INSTRUCTIONS
Mom w/no medical hx, not taking any medications Reassurance good capillary refill finger warm without swelling keep appointment with ortho next week

## 2023-05-12 DIAGNOSIS — S62.337A DISPLACED FRACTURE OF NECK OF FIFTH METACARPAL BONE, LEFT HAND, INITIAL ENCOUNTER FOR CLOSED FRACTURE: Primary | ICD-10-CM

## 2023-05-16 ENCOUNTER — HOSPITAL ENCOUNTER (OUTPATIENT)
Dept: RADIOLOGY | Facility: HOSPITAL | Age: 23
Discharge: HOME | End: 2023-05-16
Attending: ORTHOPAEDIC SURGERY
Payer: MEDICAID

## 2023-05-16 ENCOUNTER — OFFICE VISIT (OUTPATIENT)
Dept: ORTHOPEDICS | Facility: CLINIC | Age: 23
End: 2023-05-16
Payer: MEDICAID

## 2023-05-16 DIAGNOSIS — S62.337A DISPLACED FRACTURE OF NECK OF FIFTH METACARPAL BONE, LEFT HAND, INITIAL ENCOUNTER FOR CLOSED FRACTURE: ICD-10-CM

## 2023-05-16 DIAGNOSIS — S62.337D CLOSED DISPLACED FRACTURE OF NECK OF FIFTH METACARPAL BONE OF LEFT HAND WITH ROUTINE HEALING, SUBSEQUENT ENCOUNTER: Primary | ICD-10-CM

## 2023-05-16 PROCEDURE — 1160F RVW MEDS BY RX/DR IN RCRD: CPT | Mod: CPTII,,, | Performed by: ORTHOPAEDIC SURGERY

## 2023-05-16 PROCEDURE — 1160F PR REVIEW ALL MEDS BY PRESCRIBER/CLIN PHARMACIST DOCUMENTED: ICD-10-PCS | Mod: CPTII,,, | Performed by: ORTHOPAEDIC SURGERY

## 2023-05-16 PROCEDURE — 99213 OFFICE O/P EST LOW 20 MIN: CPT | Mod: PBBFAC | Performed by: ORTHOPAEDIC SURGERY

## 2023-05-16 PROCEDURE — 1159F MED LIST DOCD IN RCRD: CPT | Mod: CPTII,,, | Performed by: ORTHOPAEDIC SURGERY

## 2023-05-16 PROCEDURE — 99024 POSTOP FOLLOW-UP VISIT: CPT | Mod: ,,, | Performed by: ORTHOPAEDIC SURGERY

## 2023-05-16 PROCEDURE — 99024 PR POST-OP FOLLOW-UP VISIT: ICD-10-PCS | Mod: ,,, | Performed by: ORTHOPAEDIC SURGERY

## 2023-05-16 PROCEDURE — 73130 X-RAY EXAM OF HAND: CPT | Mod: TC,LT

## 2023-05-16 PROCEDURE — 73130 X-RAY EXAM OF HAND: CPT | Mod: 26,LT,, | Performed by: ORTHOPAEDIC SURGERY

## 2023-05-16 PROCEDURE — 1159F PR MEDICATION LIST DOCUMENTED IN MEDICAL RECORD: ICD-10-PCS | Mod: CPTII,,, | Performed by: ORTHOPAEDIC SURGERY

## 2023-05-16 PROCEDURE — 73130 XR HAND COMPLETE 3 VIEW LEFT: ICD-10-PCS | Mod: 26,LT,, | Performed by: ORTHOPAEDIC SURGERY

## 2023-05-16 NOTE — PROGRESS NOTES
23 y.o. Female returns to clinic for a follow up visit regarding     ICD-10-CM ICD-9-CM   1. Closed displaced fracture of neck of fifth metacarpal bone of left hand with routine healing, subsequent encounter  S62.337D V54.19        Patient reports she is still having numbness in her ring and small finger.   She reports she has not had much pain.        Past Medical History:   Diagnosis Date    Gestational diabetes mellitus (GDM) in third trimester 11/15/2021    Gestational diabetes mellitus (GDM) in third trimester 11/15/2021    Gestational hypertension, third trimester 11/15/2021    Herpes simplex virus (HSV) infection      Past Surgical History:   Procedure Laterality Date     SECTION N/A 2022    Procedure:  SECTION;  Surgeon: Michelle Cuadra MD;  Location: [facility] L&D;  Service: OB/GYN;  Laterality: N/A;         PHYSICAL EXAMINATION:    Ortho/SPM Exam  Full ROM left hand, 4/5  strength    IMAGING:  X-Ray Hand 3 view Left    Result Date: 2023  See Procedure Notes for results. IMPRESSION: Please see Ortho procedure notes for report.  This procedure was auto-finalized by: Virtual Radiologist  3 views left hand demonstrate a healing 5th MC in reasonable overall alignment.  Slight flexion seen of the Metacarpal neck fracture.   ASSESSMENT:      ICD-10-CM ICD-9-CM   1. Closed displaced fracture of neck of fifth metacarpal bone of left hand with routine healing, subsequent encounter  S62.337D V54.19       PLAN:     -Findings and treatment options were discussed with the patient  -All questions answered  Dc all braces at this time. Ok to advance WB and ROM.  See back in 4 weeks with xrays.         There are no Patient Instructions on file for this visit.      No orders of the defined types were placed in this encounter.        Procedures

## 2023-06-13 ENCOUNTER — PATIENT MESSAGE (OUTPATIENT)
Dept: RESEARCH | Facility: HOSPITAL | Age: 23
End: 2023-06-13

## 2023-06-13 DIAGNOSIS — S62.337D CLOSED DISPLACED FRACTURE OF NECK OF FIFTH METACARPAL BONE OF LEFT HAND WITH ROUTINE HEALING, SUBSEQUENT ENCOUNTER: Primary | ICD-10-CM

## 2023-06-20 ENCOUNTER — PATIENT MESSAGE (OUTPATIENT)
Dept: RESEARCH | Facility: HOSPITAL | Age: 23
End: 2023-06-20

## 2023-07-12 ENCOUNTER — OFFICE VISIT (OUTPATIENT)
Dept: FAMILY MEDICINE | Facility: CLINIC | Age: 23
End: 2023-07-12
Payer: COMMERCIAL

## 2023-07-12 ENCOUNTER — OFFICE VISIT (OUTPATIENT)
Dept: FAMILY MEDICINE | Facility: CLINIC | Age: 23
End: 2023-07-12
Payer: MEDICAID

## 2023-07-12 VITALS
HEART RATE: 80 BPM | DIASTOLIC BLOOD PRESSURE: 69 MMHG | WEIGHT: 196.63 LBS | SYSTOLIC BLOOD PRESSURE: 107 MMHG | HEIGHT: 63 IN | HEIGHT: 63 IN | SYSTOLIC BLOOD PRESSURE: 107 MMHG | WEIGHT: 196.63 LBS | HEART RATE: 80 BPM | DIASTOLIC BLOOD PRESSURE: 69 MMHG | OXYGEN SATURATION: 97 % | BODY MASS INDEX: 34.84 KG/M2 | OXYGEN SATURATION: 97 % | BODY MASS INDEX: 34.84 KG/M2

## 2023-07-12 DIAGNOSIS — Z20.9 SEXUAL BEHAVIOR WITH HIGH RISK OF EXPOSURE TO COMMUNICABLE DISEASE: Primary | ICD-10-CM

## 2023-07-12 DIAGNOSIS — Z72.51 SEXUAL BEHAVIOR WITH HIGH RISK OF EXPOSURE TO COMMUNICABLE DISEASE: Primary | ICD-10-CM

## 2023-07-12 DIAGNOSIS — Z02.0 ENCOUNTER FOR SCHOOL HISTORY AND PHYSICAL EXAMINATION: Primary | ICD-10-CM

## 2023-07-12 PROBLEM — Z30.015 ENCOUNTER FOR INITIAL PRESCRIPTION OF VAGINAL RING HORMONAL CONTRACEPTIVE: Status: RESOLVED | Noted: 2022-08-15 | Resolved: 2023-07-12

## 2023-07-12 PROBLEM — R73.9 ELEVATED BLOOD SUGAR: Status: RESOLVED | Noted: 2022-07-26 | Resolved: 2023-07-12

## 2023-07-12 PROBLEM — N89.8 VAGINAL DISCHARGE: Status: RESOLVED | Noted: 2022-09-19 | Resolved: 2023-07-12

## 2023-07-12 PROBLEM — N76.0 BACTERIAL VAGINOSIS: Status: RESOLVED | Noted: 2022-09-19 | Resolved: 2023-07-12

## 2023-07-12 PROBLEM — Z98.891 S/P CESAREAN SECTION: Status: RESOLVED | Noted: 2022-01-09 | Resolved: 2023-07-12

## 2023-07-12 PROBLEM — B96.89 BACTERIAL VAGINOSIS: Status: RESOLVED | Noted: 2022-09-19 | Resolved: 2023-07-12

## 2023-07-12 PROBLEM — Z01.419 ENCOUNTER FOR CERVICAL PAP SMEAR WITH PELVIC EXAM: Status: RESOLVED | Noted: 2022-08-15 | Resolved: 2023-07-12

## 2023-07-12 PROBLEM — Z79.899 HIGH RISK MEDICATION USE: Status: RESOLVED | Noted: 2022-07-26 | Resolved: 2023-07-12

## 2023-07-12 LAB
B-HCG UR QL: NEGATIVE
BILIRUB SERPL-MCNC: NEGATIVE MG/DL
BLOOD URINE, POC: NEGATIVE
CANDIDA SPECIES: NEGATIVE
COLOR, POC UA: YELLOW
CTP QC/QA: YES
GARDNERELLA: POSITIVE
GLUCOSE UR QL STRIP: NEGATIVE
KETONES UR QL STRIP: NORMAL
LEUKOCYTE ESTERASE URINE, POC: NEGATIVE
NITRITE, POC UA: NEGATIVE
PH, POC UA: 7
PROTEIN, POC: NORMAL
SPECIFIC GRAVITY, POC UA: 1.02
TRICHOMONAS: NEGATIVE
UROBILINOGEN, POC UA: 0.2

## 2023-07-12 PROCEDURE — 99499 NO LOS: ICD-10-PCS | Mod: ,,, | Performed by: NURSE PRACTITIONER

## 2023-07-12 PROCEDURE — 87660 TRICHOMONAS VAGIN DIR PROBE: CPT | Mod: ,,, | Performed by: CLINICAL MEDICAL LABORATORY

## 2023-07-12 PROCEDURE — 81025 URINE PREGNANCY TEST: CPT | Mod: RHCUB | Performed by: NURSE PRACTITIONER

## 2023-07-12 PROCEDURE — 87591 N.GONORRHOEAE DNA AMP PROB: CPT | Mod: ,,, | Performed by: CLINICAL MEDICAL LABORATORY

## 2023-07-12 PROCEDURE — 87480 BACTERIAL VAGINOSIS: ICD-10-PCS | Mod: ,,, | Performed by: CLINICAL MEDICAL LABORATORY

## 2023-07-12 PROCEDURE — 3008F BODY MASS INDEX DOCD: CPT | Mod: ICN,,, | Performed by: NURSE PRACTITIONER

## 2023-07-12 PROCEDURE — 81003 URINALYSIS AUTO W/O SCOPE: CPT | Mod: QW,,, | Performed by: NURSE PRACTITIONER

## 2023-07-12 PROCEDURE — 1159F PR MEDICATION LIST DOCUMENTED IN MEDICAL RECORD: ICD-10-PCS | Mod: ICN,,, | Performed by: NURSE PRACTITIONER

## 2023-07-12 PROCEDURE — 87480 CANDIDA DNA DIR PROBE: CPT | Mod: ,,, | Performed by: CLINICAL MEDICAL LABORATORY

## 2023-07-12 PROCEDURE — 87510 BACTERIAL VAGINOSIS: ICD-10-PCS | Mod: ,,, | Performed by: CLINICAL MEDICAL LABORATORY

## 2023-07-12 PROCEDURE — 99213 PR OFFICE/OUTPT VISIT, EST, LEVL III, 20-29 MIN: ICD-10-PCS | Mod: 25,ICN,, | Performed by: NURSE PRACTITIONER

## 2023-07-12 PROCEDURE — 3008F PR BODY MASS INDEX (BMI) DOCUMENTED: ICD-10-PCS | Mod: ICN,,, | Performed by: NURSE PRACTITIONER

## 2023-07-12 PROCEDURE — 3074F PR MOST RECENT SYSTOLIC BLOOD PRESSURE < 130 MM HG: ICD-10-PCS | Mod: ICN,,, | Performed by: NURSE PRACTITIONER

## 2023-07-12 PROCEDURE — 81003 URINALYSIS AUTO W/O SCOPE: CPT | Mod: RHCUB | Performed by: NURSE PRACTITIONER

## 2023-07-12 PROCEDURE — 87510 GARDNER VAG DNA DIR PROBE: CPT | Mod: ,,, | Performed by: CLINICAL MEDICAL LABORATORY

## 2023-07-12 PROCEDURE — 87491 CHLAMYDIA/GONORRHOEAE(GC), PCR: ICD-10-PCS | Mod: ,,, | Performed by: CLINICAL MEDICAL LABORATORY

## 2023-07-12 PROCEDURE — 81025 URINE PREGNANCY TEST: CPT | Mod: QW,,, | Performed by: NURSE PRACTITIONER

## 2023-07-12 PROCEDURE — 81025 PR  URINE PREGNANCY TEST: ICD-10-PCS | Mod: QW,,, | Performed by: NURSE PRACTITIONER

## 2023-07-12 PROCEDURE — 96372 THER/PROPH/DIAG INJ SC/IM: CPT | Mod: ICN,,, | Performed by: NURSE PRACTITIONER

## 2023-07-12 PROCEDURE — 81003 PR URINALYSIS, AUTO, W/O SCOPE: ICD-10-PCS | Mod: QW,,, | Performed by: NURSE PRACTITIONER

## 2023-07-12 PROCEDURE — 99499 UNLISTED E&M SERVICE: CPT | Mod: ,,, | Performed by: NURSE PRACTITIONER

## 2023-07-12 PROCEDURE — 87591 CHLAMYDIA/GONORRHOEAE(GC), PCR: ICD-10-PCS | Mod: ,,, | Performed by: CLINICAL MEDICAL LABORATORY

## 2023-07-12 PROCEDURE — 1159F MED LIST DOCD IN RCRD: CPT | Mod: ICN,,, | Performed by: NURSE PRACTITIONER

## 2023-07-12 PROCEDURE — 87491 CHLMYD TRACH DNA AMP PROBE: CPT | Mod: ,,, | Performed by: CLINICAL MEDICAL LABORATORY

## 2023-07-12 PROCEDURE — 3078F PR MOST RECENT DIASTOLIC BLOOD PRESSURE < 80 MM HG: ICD-10-PCS | Mod: ICN,,, | Performed by: NURSE PRACTITIONER

## 2023-07-12 PROCEDURE — 3078F DIAST BP <80 MM HG: CPT | Mod: ICN,,, | Performed by: NURSE PRACTITIONER

## 2023-07-12 PROCEDURE — 99213 OFFICE O/P EST LOW 20 MIN: CPT | Mod: 25,ICN,, | Performed by: NURSE PRACTITIONER

## 2023-07-12 PROCEDURE — 3074F SYST BP LT 130 MM HG: CPT | Mod: ICN,,, | Performed by: NURSE PRACTITIONER

## 2023-07-12 PROCEDURE — 87660 BACTERIAL VAGINOSIS: ICD-10-PCS | Mod: ,,, | Performed by: CLINICAL MEDICAL LABORATORY

## 2023-07-12 PROCEDURE — 96372 PR INJECTION,THERAP/PROPH/DIAG2ST, IM OR SUBCUT: ICD-10-PCS | Mod: ICN,,, | Performed by: NURSE PRACTITIONER

## 2023-07-12 RX ORDER — AZITHROMYCIN 250 MG/1
500 TABLET, FILM COATED ORAL 2 TIMES DAILY
Qty: 6 TABLET | Refills: 0 | Status: SHIPPED | OUTPATIENT
Start: 2023-07-12

## 2023-07-12 RX ORDER — CEFTRIAXONE 1 G/1
1 INJECTION, POWDER, FOR SOLUTION INTRAMUSCULAR; INTRAVENOUS
Status: COMPLETED | OUTPATIENT
Start: 2023-07-12 | End: 2023-07-12

## 2023-07-12 RX ADMIN — CEFTRIAXONE 1 G: 1 INJECTION, POWDER, FOR SOLUTION INTRAMUSCULAR; INTRAVENOUS at 10:07

## 2023-07-12 NOTE — PROGRESS NOTES
ISRAEL Danielle    [address] [place] [address]     PATIENT NAME: Alida Tompkins  : 2000  DATE: 23  MRN: [id number]      Billing Provider: ISRAEL Danielle  Level of Service: NO LOS      Assessment and Plan (including Health Maintenance)     Problem List Items Addressed This Visit    None  Visit Diagnoses       Encounter for school history and physical examination    -  Primary            Health Maintenance Topics with due status: Not Due       Topic Last Completion Date    Pap Smear 08/15/2022    TETANUS VACCINE 2023    Influenza Vaccine Not Due       Future Appointments   Date Time Provider Department Center   2023  1:45 PM [facility]      No follow-ups on file.   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 )     Here for school physical  Patient's history reviewed  UTD on immunization except flu vaccine  See attached immunization record  No physical limitation nor mental limitations to report      Review of Systems     Review of Systems   Constitutional: Negative.    All other systems reviewed and are negative.     Medical / Social / Family History     Past Medical History:   Diagnosis Date    Bacterial vaginosis 2022    Elevated blood sugar 2022    Encounter for cervical Pap smear with pelvic exam 8/15/2022    Encounter for initial prescription of vaginal ring hormonal contraceptive 8/15/2022    Gestational diabetes mellitus (GDM) in third trimester 11/15/2021     Gestational diabetes mellitus (GDM) in third trimester 11/15/2021    Gestational hypertension, third trimester 11/15/2021    Herpes simplex virus (HSV) infection     High risk medication use 2022    S/P  section 2022    Vaginal discharge 2022       Past Surgical History:   Procedure Laterality Date     SECTION N/A 2022    Procedure:  SECTION;  Surgeon: Michelle Cuadra MD;  Location: [facility] L&D;  Service: OB/GYN;  Laterality: N/A;       Social History  Alida Tompkins  reports that she has never smoked. She has never been exposed to tobacco smoke. She has never used smokeless tobacco. She reports that she does not currently use alcohol. She reports that she does not use drugs.    Family History  Alida Tompkins's family history includes Diabetes in her mother; Hypertension in her brother, father, and mother; No Known Problems in her brother and daughter.    Medications and Allergies   Medications  No outpatient medications have been marked as taking for the 23 encounter (Office Visit) with ISRAEL Rascon.     Current Facility-Administered Medications for the 23 encounter (Office Visit) with ISRAEL Rascon   Medication Dose Route Frequency Provider Last Rate Last Admin    [COMPLETED] cefTRIAXone injection 1 g  1 g Intramuscular 1 time in Clinic/HOD ISRAEL Rascon   1 g at 23 1035       Allergies  Review of patient's allergies indicates:  No Known Allergies    Physical Examination     Vitals:    23 0909   BP: 107/69   Pulse: 80    Patient's last menstrual period was 2023 (approximate).  Physical Exam  Constitutional:       Appearance: Normal appearance.   HENT:      Head: Normocephalic.      Right Ear: Tympanic membrane, ear canal and external ear normal.      Left Ear: Tympanic membrane, ear canal and external ear normal.      Nose: Nose normal.      Mouth/Throat:      Mouth: Mucous membranes are moist.   Eyes:       Extraocular Movements: Extraocular movements intact.      Pupils: Pupils are equal, round, and reactive to light.      Comments: Vision Screening  Right eye - Without correction:   With correction: 20/30  Left eye - Without correction:   With correction: 20/25  Both eyes - Without correction:   With correction: 20/25      Cardiovascular:      Rate and Rhythm: Normal rate and regular rhythm.      Pulses: Normal pulses.      Heart sounds: Normal heart sounds.   Pulmonary:      Effort: Pulmonary effort is normal.      Breath sounds: Normal breath sounds.   Abdominal:      General: Bowel sounds are normal.      Palpations: Abdomen is soft.   Genitourinary:     Comments: deferred  Musculoskeletal:         General: Normal range of motion.      Cervical back: Normal range of motion and neck supple.   Skin:     General: Skin is warm and dry.      Capillary Refill: Capillary refill takes less than 2 seconds.   Neurological:      General: No focal deficit present.      Mental Status: She is alert and oriented to person, place, and time. Mental status is at baseline.   Psychiatric:         Mood and Affect: Mood normal.         Behavior: Behavior normal.         Thought Content: Thought content normal.        LABS:     I have reviewed old labs below:  Lab Results   Component Value Date    WBC 9.03 01/09/2022    HGB 9.3 (L) 01/09/2022    HCT 29.0 (L) 01/09/2022    MCV 73.4 (L) 01/09/2022     01/09/2022     01/08/2022    K 4.0 01/08/2022     01/08/2022    CALCIUM 8.8 01/08/2022    CO2 23 01/08/2022    GLU 72 (L) 01/08/2022    BUN 10 01/08/2022    CREATININE 0.57 01/08/2022    ANIONGAP 16 01/08/2022    EGFRNONAA 140 01/08/2022    PROT 7.5 01/08/2022    ALBUMIN 2.9 (L) 01/08/2022    BILITOT 0.4 01/08/2022    ALKPHOS 131 (H) 01/08/2022    ALT 25 01/08/2022    AST 17 01/08/2022    HGBA1C 6.6 07/26/2022       Signature:  Jania Austin, ISRAEL  [facility]  [address] [place] [address] [place]  [address]  Phone #: [phone]  Fax #: [phone]       Date of encounter: 7/12/23    There are no Patient Instructions on file for this visit.

## 2023-07-12 NOTE — LETTER
Name: Alida Tompkins   Age: 23 y.o.  Address: [address] [place] [address] [place] [address]  : 2000   Phone: [phone]   Company: [employer]    Date of Exam: 2023  Type of Exam: [x] Pre-Placement  []  Periodic  []  Return to Work     Patient History:  Past Medical History:   Diagnosis Date    Bacterial vaginosis 2022    Elevated blood sugar 2022    Encounter for cervical Pap smear with pelvic exam 8/15/2022    Encounter for initial prescription of vaginal ring hormonal contraceptive 8/15/2022    Gestational diabetes mellitus (GDM) in third trimester 11/15/2021    Gestational diabetes mellitus (GDM) in third trimester 11/15/2021    Gestational hypertension, third trimester 11/15/2021    Herpes simplex virus (HSV) infection     High risk medication use 2022    S/P  section 2022    Vaginal discharge 2022     Family History   Problem Relation Age of Onset    Hypertension Father     Diabetes Mother     Hypertension Mother     No Known Problems Brother     Hypertension Brother     No Known Problems Daughter      Past Surgical History:   Procedure Laterality Date     SECTION N/A 2022    Procedure:  SECTION;  Surgeon: Michelle Cuadra MD;  Location: [facility] L&D;  Service: OB/GYN;  Laterality: N/A;         No flowsheet data found.   .    WARNING; PURSUANT TO [place]-RS 23:1208.1, Alida Tompkins UNDERSTANDS THAT THE FAILURE TO ANSWER TRUTHFULLY ANY OF THE ABOVE QUESTIONS MAY RESULT IN FORFEITURE OF ANY RIGHT THE PATIENT OR ANY DEPENDENTS MAY HAVE TO WORKERS' COMPENSATION BENEFITS, INCLUDING MEDICAL TREATMENT AND EXPENSES.  Patient hereby authorizes any and all information gained from this examination to be transmitted to the above company.  Patient has not withheld information or given any false data and understands that any recommendations to the above named company are based on the limited medical studies that the provider has been authorized to  perform.  Patient understands that this is a limited examination and does not eliminate all possibilities of physical impairment, disease, or capabilities.  Patient will not hold the examining physician responsible for any physical condition, which may or may not be exposed by this limited examination.    Patient consent: yes    Job title: Data Unavailable    Outpatient Encounter Medications as of 7/12/2023   Medication Sig Dispense Refill    atenoloL (TENORMIN) 25 MG tablet Take 25 mg by mouth once daily. Unsure of dose      ergocalciferol (ERGOCALCIFEROL) 50,000 unit Cap Take 50,000 Units by mouth every 7 days.      phentermine (ADIPEX-P) 37.5 mg tablet Take 37.5 mg by mouth.      TRULICITY 0.75 mg/0.5 mL pen injector Inject 0.75 mg into the skin every 7 days.      [DISCONTINUED] metroNIDAZOLE (FLAGYL) 500 MG tablet Take 1 tablet (500 mg total) by mouth every 12 (twelve) hours. (Patient not taking: Reported on 7/12/2023) 14 tablet 0     No facility-administered encounter medications on file as of 7/12/2023.        Allergic to any medication? has No Known Allergies.    Any auto accidents or injuries? No    Has a doctor ever treated you for any back, neck, knee, or other orthopedic problems? No.    Social History     Tobacco Use   Smoking Status Never    Passive exposure: Never   Smokeless Tobacco Never       Social History     Substance and Sexual Activity   Alcohol Use Not Currently       Last menstrual period: Patient's last menstrual period was 06/27/2023 (approximate).    Vitals:    07/12/23 0909   BP: 107/69   Pulse: 80       Urine:   Glucose, UA   Date Value Ref Range Status   07/12/2023 negative  Final       No results found for: PROTEINUR    Spec Grav UA   Date Value Ref Range Status   07/12/2023 1.020  Final     Other: patient has had fracture of extremity in the past/ cleared by Ortho    Vision Screening    Right eye Left eye Both eyes   Without correction      With correction 20/30 20/25 20/25        HPI  Here for school physical  Patient's history reviewed  UTD on immunization except flu vaccine  See attached immunization record  No physical limitation nor mental limitations to report  ROS   Review of Systems   Constitutional: Negative.    All other systems reviewed and are negative.   Physical Exam    Physical Exam  Constitutional:       Appearance: Normal appearance.   HENT:      Head: Normocephalic.      Right Ear: Tympanic membrane, ear canal and external ear normal.      Left Ear: Tympanic membrane, ear canal and external ear normal.      Nose: Nose normal.      Mouth/Throat:      Mouth: Mucous membranes are moist.   Eyes:      Extraocular Movements: Extraocular movements intact.      Pupils: Pupils are equal, round, and reactive to light.      Comments: Vision Screening  Right eye - Without correction:   With correction: 20/30  Left eye - Without correction:   With correction: 20/25  Both eyes - Without correction:   With correction: 20/25      Cardiovascular:      Rate and Rhythm: Normal rate and regular rhythm.      Pulses: Normal pulses.      Heart sounds: Normal heart sounds.   Pulmonary:      Effort: Pulmonary effort is normal.      Breath sounds: Normal breath sounds.   Abdominal:      General: Bowel sounds are normal.      Palpations: Abdomen is soft.   Genitourinary:     Comments: deferred  Musculoskeletal:         General: Normal range of motion.      Cervical back: Normal range of motion and neck supple.   Skin:     General: Skin is warm and dry.      Capillary Refill: Capillary refill takes less than 2 seconds.   Neurological:      General: No focal deficit present.      Mental Status: She is alert and oriented to person, place, and time. Mental status is at baseline.   Psychiatric:         Mood and Affect: Mood normal.         Behavior: Behavior normal.         Thought Content: Thought content normal.   Ancillary:  No orders of the defined types were placed in this encounter.  Strength &  Flexibility Score:  good    LIFT/STRENGTH/CARRYING RESULTS:  full duty    Immunizations:  Immunization History   Administered Date(s) Administered    COVID-19, MRNA, LN-S, PF (MODERNA FULL 0.5 ML DOSE) 08/10/2021, 09/07/2021    DTaP 2000, 1998, 1998, 10/07/1999, 03/19/2002    HPV Quadrivalent 09/20/2011, 07/03/2012    Hepatitis A, Pediatric/Adolescent, 2 Dose 07/23/2012    Hepatitis B, Pediatric/Adolescent 1998, 1998, 1998    HiB PRP-OMP 1998, 1998, 1998, 10/07/1999    MMR 02/21/1999, 03/19/2002    Meningococcal Conjugate (MCV4P) 09/20/2011    OPV 1998, 1998, 10/07/1999, 03/19/2002    Tdap 09/20/2011, 04/17/2023    Varicella 03/19/2002, 08/07/2007   Details of abnormal or significant findings on physical exam:  N/A  Examined by: Jania Austin

## 2023-07-12 NOTE — PATIENT INSTRUCTIONS
Avoid sexual intercourse when taking the prescribed medication and avoid sexual intercourse until both partners have completed medication therapy  Be sure your sex partner has been notified and seeks treatment  It is recommended to be re-screened in 3-6 months to ensure you have not been re-infected/exposed and all infection is clear  Always use protection during sexual activity to avoid exposure to infections/bacteria and to reduce risk of pregnancy if you are female  The health department will be notified if your test is positive for chlamydia, gonorrhea, or certain other sexual transmitted infections   Avoid sexual intercourse when taking the prescribed medication and avoid sexual intercourse until both partners have completed medication therapy  Be sure your sex partner has been notified and seeks treatment  It is recommended to be re-screened in 3-6 months to ensure you have not been re-infected/exposed and all infection is clear  Always use protection during sexual activity to avoid exposure to infections/bacteria and to reduce risk of pregnancy if you are female  The health department will be notified if your test is positive for chlamydia, gonorrhea, or certain other sexual transmitted infections

## 2023-07-12 NOTE — ASSESSMENT & PLAN NOTE
Rocephin 1 gm im today  Azithromycin 250 mg two pills bid until completed #6  Will contact patient with results when available  Avoid sexual intercourse when taking the prescribed medication and avoid sexual intercourse until both partners have completed medication therapy  Be sure your sex partner has been notified and seeks treatment  It is recommended to be re-screened in 3-6 months to ensure you have not been re-infected/exposed and all infection is clear  Always use protection during sexual activity to avoid exposure to infections/bacteria and to reduce risk of pregnancy if you are female  The health department will be notified if your test is positive for chlamydia, gonorrhea, or certain other sexual transmitted infections

## 2023-07-12 NOTE — PROGRESS NOTES
ISRAEL Danielle    [address] [place] [address]     PATIENT NAME: Alida Tompkins  : 2000  DATE: 23  MRN: [id number]      Billing Provider: ISRAEL Danielle  Level of Service: SD OFFICE/OUTPT VISIT, EST, LEVL III, 20-29 MIN      Assessment and Plan (including Health Maintenance)     Problem List Items Addressed This Visit          Other    Sexual behavior with high risk of exposure to communicable disease - Primary    Current Assessment & Plan     Rocephin 1 gm im today  Azithromycin 250 mg two pills bid until completed #6  Will contact patient with results when available  Avoid sexual intercourse when taking the prescribed medication and avoid sexual intercourse until both partners have completed medication therapy  Be sure your sex partner has been notified and seeks treatment  It is recommended to be re-screened in 3-6 months to ensure you have not been re-infected/exposed and all infection is clear  Always use protection during sexual activity to avoid exposure to infections/bacteria and to reduce risk of pregnancy if you are female  The health department will be notified if your test is positive for chlamydia, gonorrhea, or certain other sexual transmitted infections               Relevant Medications    cefTRIAXone injection 1 g (Completed)    azithromycin (Z-BEULAH) 250 MG tablet    Other Relevant Orders    Bacterial Vaginosis    Chlamydia/GC, PCR    POCT urine pregnancy (Completed)    POCT URINALYSIS W/O SCOPE (Completed)       Health Maintenance Topics with due status: Not Due       Topic Last Completion Date    Pap Smear 08/15/2022    TETANUS VACCINE 2023    Influenza Vaccine Not Due       Future Appointments   Date Time Provider Department Center   2023  1:45 PM Farrah Bradford NP [facility]      Follow up in about 3 months (around 10/12/2023) for recheck.   Health Maintenance Due   Topic Date Due    Hepatitis C  Screening  Never done    Lipid Panel  Never done    COVID-19 Vaccine (3 - Moderna series) 2021         Reason for Visit / Chief Complaint:   check up (Pt is here wanting to get a STD Panel done. She recently had a new sexual partner and wants to get checked to be safe. )     History of Present Illness / Problem Focused Workflow     Alida Tompkins presents to the clinic with check up (Pt is here wanting to get a STD Panel done. She recently had a new sexual partner and wants to get checked to be safe. )     HX: gonorrhea/chlamydia/herpes  Not on suppression therapy for herpes  LMP 2023  New partner/ reports using condomns    Vaginal Discharge  The patient's pertinent negatives include no genital itching, genital lesions, genital odor, genital rash, missed menses, pelvic pain, vaginal bleeding or vaginal discharge. This is a new problem. The current episode started 1 to 4 weeks ago. The problem has been waxing and waning. The patient is experiencing no pain. She is not pregnant. Pertinent negatives include no abdominal pain, anorexia, back pain, chills, constipation, diarrhea, discolored urine, dysuria, fever, flank pain, frequency, headaches, hematuria, joint pain, joint swelling, nausea, painful intercourse, rash, sore throat, urgency or vomiting. Nothing aggravates the symptoms. She has tried nothing for the symptoms. The treatment provided no relief. She is sexually active. It is unknown whether or not her partner has an STD. She uses condoms for contraception. Her menstrual history has been regular. Her past medical history is significant for a  section and an STD.     Review of Systems     Review of Systems   Constitutional:  Negative for chills and fever.   HENT:  Negative for sore throat.    Eyes: Negative.    Respiratory: Negative.     Gastrointestinal:  Negative for abdominal pain, anorexia, constipation, diarrhea, nausea and vomiting.   Genitourinary:  Negative for dysuria, flank pain,  frequency, hematuria, missed menses, pelvic pain, urgency and vaginal discharge.   Musculoskeletal:  Negative for back pain and joint pain.   Integumentary:  Negative for rash.   Neurological:  Negative for headaches.      Medical / Social / Family History     Past Medical History:   Diagnosis Date    Bacterial vaginosis 2022    Elevated blood sugar 2022    Encounter for cervical Pap smear with pelvic exam 8/15/2022    Encounter for initial prescription of vaginal ring hormonal contraceptive 8/15/2022    Gestational diabetes mellitus (GDM) in third trimester 11/15/2021    Gestational diabetes mellitus (GDM) in third trimester 11/15/2021    Gestational hypertension, third trimester 11/15/2021    Herpes simplex virus (HSV) infection     High risk medication use 2022    S/P  section 2022    Vaginal discharge 2022       Past Surgical History:   Procedure Laterality Date     SECTION N/A 2022    Procedure:  SECTION;  Surgeon: Michelle Cuadra MD;  Location: [facility] L&D;  Service: OB/GYN;  Laterality: N/A;       Social History  Alida Tompkins  reports that she has never smoked. She has never been exposed to tobacco smoke. She has never used smokeless tobacco. She reports that she does not currently use alcohol. She reports that she does not use drugs.    Family History  Alida Tompkins's family history includes Diabetes in her mother; Hypertension in her brother, father, and mother; No Known Problems in her brother and daughter.    Medications and Allergies   Medications  No outpatient medications have been marked as taking for the 23 encounter (Office Visit) with ISRAEL Rascon.     Current Facility-Administered Medications for the 23 encounter (Office Visit) with ISRAEL Rascon   Medication Dose Route Frequency Provider Last Rate Last Admin    [COMPLETED] cefTRIAXone injection 1 g  1 g Intramuscular 1 time in Clinic/HOD ISRAEL Rascon   1  g at 07/12/23 1035       Allergies  Review of patient's allergies indicates:  No Known Allergies    Physical Examination     Vitals:    07/12/23 0932   BP: 107/69   Pulse: 80    Patient's last menstrual period was 06/27/2023 (approximate).  Physical Exam  Constitutional:       Appearance: She is obese.   Cardiovascular:      Rate and Rhythm: Normal rate and regular rhythm.      Pulses: Normal pulses.      Heart sounds: Normal heart sounds.   Pulmonary:      Effort: Pulmonary effort is normal.      Breath sounds: Normal breath sounds.   Musculoskeletal:      Cervical back: Neck supple.   Skin:     General: Skin is warm.      Capillary Refill: Capillary refill takes 2 to 3 seconds.   Neurological:      Mental Status: She is alert and oriented to person, place, and time.   Psychiatric:         Mood and Affect: Mood normal.         Behavior: Behavior normal.        LABS:     I have reviewed old labs below:  Lab Results   Component Value Date    WBC 9.03 01/09/2022    HGB 9.3 (L) 01/09/2022    HCT 29.0 (L) 01/09/2022    MCV 73.4 (L) 01/09/2022     01/09/2022     01/08/2022    K 4.0 01/08/2022     01/08/2022    CALCIUM 8.8 01/08/2022    CO2 23 01/08/2022    GLU 72 (L) 01/08/2022    BUN 10 01/08/2022    CREATININE 0.57 01/08/2022    ANIONGAP 16 01/08/2022    EGFRNONAA 140 01/08/2022    PROT 7.5 01/08/2022    ALBUMIN 2.9 (L) 01/08/2022    BILITOT 0.4 01/08/2022    ALKPHOS 131 (H) 01/08/2022    ALT 25 01/08/2022    AST 17 01/08/2022    HGBA1C 6.6 07/26/2022       Signature:  ISRAEL Danielle  [address] [place] [address]  Phone #: [phone]  Fax #: [phone]       Date of encounter: 7/12/23    Patient Instructions   Avoid sexual intercourse when taking the prescribed medication and avoid sexual intercourse until both partners have completed medication therapy  Be sure your sex partner has been notified and seeks treatment  It is recommended to be  re-screened in 3-6 months to ensure you have not been re-infected/exposed and all infection is clear  Always use protection during sexual activity to avoid exposure to infections/bacteria and to reduce risk of pregnancy if you are female  The health department will be notified if your test is positive for chlamydia, gonorrhea, or certain other sexual transmitted infections   Avoid sexual intercourse when taking the prescribed medication and avoid sexual intercourse until both partners have completed medication therapy  Be sure your sex partner has been notified and seeks treatment  It is recommended to be re-screened in 3-6 months to ensure you have not been re-infected/exposed and all infection is clear  Always use protection during sexual activity to avoid exposure to infections/bacteria and to reduce risk of pregnancy if you are female  The health department will be notified if your test is positive for chlamydia, gonorrhea, or certain other sexual transmitted infections

## 2023-07-13 DIAGNOSIS — N76.0 GARDNERELLA VAGINALIS INFECTION: Primary | ICD-10-CM

## 2023-07-13 DIAGNOSIS — B96.89 GARDNERELLA VAGINALIS INFECTION: Primary | ICD-10-CM

## 2023-07-13 LAB
CHLAMYDIA BY PCR: NEGATIVE
N. GONORRHOEAE (GC) BY PCR: NEGATIVE

## 2023-07-13 RX ORDER — METRONIDAZOLE 7.5 MG/G
1 GEL VAGINAL NIGHTLY
Qty: 70 G | Refills: 0 | Status: SHIPPED | OUTPATIENT
Start: 2023-07-13 | End: 2023-07-20

## 2023-07-19 ENCOUNTER — PATIENT MESSAGE (OUTPATIENT)
Dept: RESEARCH | Facility: HOSPITAL | Age: 23
End: 2023-07-19

## 2023-07-24 ENCOUNTER — PATIENT MESSAGE (OUTPATIENT)
Dept: RESEARCH | Facility: HOSPITAL | Age: 23
End: 2023-07-24

## 2024-04-29 ENCOUNTER — TELEPHONE (OUTPATIENT)
Dept: OBSTETRICS AND GYNECOLOGY | Facility: CLINIC | Age: 24
End: 2024-04-29
Payer: MEDICAID

## 2024-04-29 DIAGNOSIS — N91.1 SECONDARY AMENORRHEA: Primary | ICD-10-CM

## 2024-05-16 ENCOUNTER — HOSPITAL ENCOUNTER (OUTPATIENT)
Dept: RADIOLOGY | Facility: HOSPITAL | Age: 24
Discharge: HOME | End: 2024-05-16
Attending: ADVANCED PRACTICE MIDWIFE
Payer: MEDICAID

## 2024-05-16 ENCOUNTER — INITIAL PRENATAL (OUTPATIENT)
Dept: OBSTETRICS AND GYNECOLOGY | Facility: CLINIC | Age: 24
End: 2024-05-16
Payer: MEDICAID

## 2024-05-16 VITALS
BODY MASS INDEX: 36.49 KG/M2 | WEIGHT: 206 LBS | SYSTOLIC BLOOD PRESSURE: 118 MMHG | HEART RATE: 88 BPM | DIASTOLIC BLOOD PRESSURE: 78 MMHG

## 2024-05-16 DIAGNOSIS — Z86.32 HISTORY OF GESTATIONAL DIABETES: ICD-10-CM

## 2024-05-16 DIAGNOSIS — Z32.01 POSITIVE PREGNANCY TEST: ICD-10-CM

## 2024-05-16 DIAGNOSIS — Z3A.08 8 WEEKS GESTATION OF PREGNANCY: ICD-10-CM

## 2024-05-16 DIAGNOSIS — E55.9 VITAMIN D DEFICIENCY: ICD-10-CM

## 2024-05-16 DIAGNOSIS — Z86.19 HISTORY OF HERPES GENITALIS: ICD-10-CM

## 2024-05-16 DIAGNOSIS — O34.219 PREVIOUS CESAREAN DELIVERY AFFECTING PREGNANCY: ICD-10-CM

## 2024-05-16 DIAGNOSIS — N91.1 SECONDARY AMENORRHEA: Primary | ICD-10-CM

## 2024-05-16 DIAGNOSIS — N91.1 SECONDARY AMENORRHEA: ICD-10-CM

## 2024-05-16 DIAGNOSIS — Z11.3 SCREENING FOR STD (SEXUALLY TRANSMITTED DISEASE): ICD-10-CM

## 2024-05-16 PROCEDURE — 87077 CULTURE AEROBIC IDENTIFY: CPT | Mod: ,,, | Performed by: CLINICAL MEDICAL LABORATORY

## 2024-05-16 PROCEDURE — 87661 TRICHOMONAS VAGINALIS AMPLIF: CPT | Mod: ,,, | Performed by: CLINICAL MEDICAL LABORATORY

## 2024-05-16 PROCEDURE — 76801 OB US < 14 WKS SINGLE FETUS: CPT | Mod: TC

## 2024-05-16 PROCEDURE — 87491 CHLMYD TRACH DNA AMP PROBE: CPT | Mod: ,,, | Performed by: CLINICAL MEDICAL LABORATORY

## 2024-05-16 PROCEDURE — 87591 N.GONORRHOEAE DNA AMP PROB: CPT | Mod: ,,, | Performed by: CLINICAL MEDICAL LABORATORY

## 2024-05-16 PROCEDURE — 76801 OB US < 14 WKS SINGLE FETUS: CPT | Mod: 26,,, | Performed by: RADIOLOGY

## 2024-05-16 PROCEDURE — 99203 OFFICE O/P NEW LOW 30 MIN: CPT | Mod: S$PBB,TH,, | Performed by: ADVANCED PRACTICE MIDWIFE

## 2024-05-16 PROCEDURE — 87186 SC STD MICRODIL/AGAR DIL: CPT | Mod: ,,, | Performed by: CLINICAL MEDICAL LABORATORY

## 2024-05-16 PROCEDURE — 87086 URINE CULTURE/COLONY COUNT: CPT | Mod: ,,, | Performed by: CLINICAL MEDICAL LABORATORY

## 2024-05-16 PROCEDURE — 99213 OFFICE O/P EST LOW 20 MIN: CPT | Mod: PBBFAC,25 | Performed by: ADVANCED PRACTICE MIDWIFE

## 2024-05-16 RX ORDER — VALACYCLOVIR HYDROCHLORIDE 1 G/1
1000 TABLET, FILM COATED ORAL DAILY
Qty: 30 TABLET | Refills: 11 | Status: SHIPPED | OUTPATIENT
Start: 2024-05-16 | End: 2025-05-16

## 2024-05-16 NOTE — PROGRESS NOTES
Initial OB Visit    Subjective:      Alida Tompkins is being seen today for her first obstetrical visit.  This is not a planned pregnancy. She is at  8w 1d gestation. Her obstetrical history is significant for   x1 , hx of gestational hypertension and gestational diabetes with second pregnancy, hx HSV  . Relationship with FOB: spouse, living together. Patient  undecided  on breast feeding. Denies vaginal bleeding, abd pain or cramping.  Reports hx HSV and currently has symptoms.  Does not have frequent outbreaks.  Rx for Valtrex sent today with instructions on how to take.  Pt states she took a Plan B    Pregnancy history reviewed.  Problem list updated.    Menstrual History:  OB History          3    Para   2    Term   2            AB        Living   2         SAB        IAB        Ectopic        Multiple   0    Live Births   2             Vaginal Delivery with 1st pregnancy, no complications. Proven pelvis up to 7#8oz   2nd pregnancy for fetal distress with confirmed LCTV uterine incision per op report.    Patient's last menstrual period was 2024.  EDC by LMP 24  US today 24  FHTs 146  Single IUP w/ FHT's  Cervix 3.9cm        The following portions of the patient's history were reviewed and updated as appropriate: allergies, current medications, past family history, past medical history, past social history, past surgical history, and problem list.    Review of Systems  Pertinent items are noted in HPI.      Objective:      /78   Pulse 88   Wt 93.4 kg (206 lb)   LMP 2024   BMI 36.49 kg/m²   General appearance: alert, appears stated age, cooperative, and no distress  Head: Normocephalic, without obvious abnormality, atraumatic  Lungs: clear to auscultation bilaterally and even/unlabored  Heart: regular rate and rhythm  Abdomen: soft, non-tender  Extremities: extremities normal, atraumatic, no cyanosis or edema  Skin: Skin color, texture, turgor normal. No  rashes or lesions      Assessment:     Secondary Amenorrhea   Pregnancy at 8 and 1/7 weeks   STD Screening   Hx GDM, insulin controlled  Hx GHTN  Previous  x 1  Hx HSV    Plan:      Initial labs drawn.  Benedict Screen discussed.  Prenatal vitamins daily.  ASA 81mg daily  Early 1hr GTT  New OB booklet given for pt to review.    Discussed midwifery care in collaboration with Dr Pritchett and Dr Santoyo.    Reviewed healthy diet, exercise during pregnancy and weight gain recommendations.    Safe OTC med list given and reviewed.    Discussed danger s/s to report including bleeding precautions.      Breastfeeding  - Discussed benefits of breastfeeding for mother and baby and limitations of formula  - Educated mother on milk and milk production  - Encouraged to feed infant on demand  - Needs 8-12 feeds in 24 hrs  - Does not need to go more then 4-5 hours with out a feed  - Reviewed feeding cues, feeding patterns and positions  - Encouraged patient to review pregnancy guide for additional information    Encouraged [employer] Access      Follow up in about 4 weeks (around 2024) for OBV.

## 2024-05-17 ENCOUNTER — PATIENT MESSAGE (OUTPATIENT)
Dept: OBSTETRICS AND GYNECOLOGY | Facility: CLINIC | Age: 24
End: 2024-05-17
Payer: MEDICAID

## 2024-05-17 LAB
CHLAMYDIA BY PCR: POSITIVE
N. GONORRHOEAE (GC) BY PCR: NEGATIVE
TRICHOMONAS NAT: NEGATIVE

## 2024-05-18 LAB — UA COMPLETE W REFLEX CULTURE PNL UR: ABNORMAL

## 2024-05-28 RX ORDER — AZITHROMYCIN 500 MG/1
1000 TABLET, FILM COATED ORAL DAILY
Qty: 2 TABLET | Refills: 0 | Status: SHIPPED | OUTPATIENT
Start: 2024-05-28 | End: 2024-05-29

## 2024-05-30 ENCOUNTER — CLINICAL SUPPORT (OUTPATIENT)
Dept: OBSTETRICS AND GYNECOLOGY | Facility: CLINIC | Age: 24
End: 2024-05-30
Payer: MEDICAID

## 2024-05-30 ENCOUNTER — TELEPHONE (OUTPATIENT)
Dept: OBSTETRICS AND GYNECOLOGY | Facility: CLINIC | Age: 24
End: 2024-05-30
Payer: MEDICAID

## 2024-05-30 VITALS — WEIGHT: 209 LBS | BODY MASS INDEX: 37.02 KG/M2

## 2024-05-30 DIAGNOSIS — N39.0 URINARY TRACT INFECTION WITHOUT HEMATURIA, SITE UNSPECIFIED: Primary | ICD-10-CM

## 2024-05-30 PROCEDURE — 96372 THER/PROPH/DIAG INJ SC/IM: CPT | Mod: PBBFAC | Performed by: ADVANCED PRACTICE MIDWIFE

## 2024-05-30 PROCEDURE — 99999PBSHW PR PBB SHADOW TECHNICAL ONLY FILED TO HB: Mod: PBBFAC,,,

## 2024-05-30 PROCEDURE — 99212 OFFICE O/P EST SF 10 MIN: CPT | Mod: PBBFAC,25

## 2024-05-30 RX ORDER — CEFTRIAXONE 1 G/1
1 INJECTION, POWDER, FOR SOLUTION INTRAMUSCULAR; INTRAVENOUS
Status: COMPLETED | OUTPATIENT
Start: 2024-05-30 | End: 2024-05-30

## 2024-05-30 RX ADMIN — CEFTRIAXONE SODIUM 1 G: 1 INJECTION, POWDER, FOR SOLUTION INTRAMUSCULAR; INTRAVENOUS at 02:05

## 2024-05-30 NOTE — TELEPHONE ENCOUNTER
Patient notified of lab results and to  RX from pharmacy for treatment, also no SA with current partner until 2 wks after he has been treated also. Patient is coming for Rocephin injection today to treat UTI

## 2024-05-30 NOTE — TELEPHONE ENCOUNTER
----- Message from Rosa Gamboa CNM sent at 5/28/2024  7:23 PM CDT -----  Call patient and let her know she is positive for Chlamydia.  Rx for Zithromax has been sent to her pharmacy.  NO SA with current partner until 2 weeks after he has been treated.  She also has a UTI and needs to come in for a Rocephin Injection to treat this. Please get her on the nurse schedule.

## 2024-06-13 ENCOUNTER — ROUTINE PRENATAL (OUTPATIENT)
Dept: OBSTETRICS AND GYNECOLOGY | Facility: CLINIC | Age: 24
End: 2024-06-13
Payer: MEDICAID

## 2024-06-13 VITALS
WEIGHT: 211 LBS | BODY MASS INDEX: 37.38 KG/M2 | DIASTOLIC BLOOD PRESSURE: 70 MMHG | HEART RATE: 70 BPM | SYSTOLIC BLOOD PRESSURE: 120 MMHG

## 2024-06-13 DIAGNOSIS — Z3A.12 12 WEEKS GESTATION OF PREGNANCY: ICD-10-CM

## 2024-06-13 DIAGNOSIS — R35.0 URINARY FREQUENCY: ICD-10-CM

## 2024-06-13 DIAGNOSIS — O34.219 PREVIOUS CESAREAN DELIVERY AFFECTING PREGNANCY: ICD-10-CM

## 2024-06-13 DIAGNOSIS — Z86.32 HISTORY OF GESTATIONAL DIABETES: Primary | ICD-10-CM

## 2024-06-13 DIAGNOSIS — Z86.19 HISTORY OF HERPES GENITALIS: ICD-10-CM

## 2024-06-13 LAB
BILIRUB SERPL-MCNC: NORMAL MG/DL
BLOOD URINE, POC: NORMAL
COLOR, POC UA: YELLOW
GLUCOSE UR QL STRIP: NORMAL
KETONES UR QL STRIP: NORMAL
LEUKOCYTE ESTERASE URINE, POC: NORMAL
NITRITE, POC UA: NORMAL
PH, POC UA: 5
PROTEIN, POC: NORMAL
SPECIFIC GRAVITY, POC UA: 1.02
UROBILINOGEN, POC UA: NORMAL

## 2024-06-13 PROCEDURE — 81003 URINALYSIS AUTO W/O SCOPE: CPT | Mod: PBBFAC | Performed by: ADVANCED PRACTICE MIDWIFE

## 2024-06-13 PROCEDURE — 99213 OFFICE O/P EST LOW 20 MIN: CPT | Mod: S$PBB,TH,, | Performed by: ADVANCED PRACTICE MIDWIFE

## 2024-06-13 PROCEDURE — 99999 PR PBB SHADOW E&M-EST. PATIENT-LVL III: CPT | Mod: PBBFAC,,, | Performed by: ADVANCED PRACTICE MIDWIFE

## 2024-06-13 PROCEDURE — 99999PBSHW POCT URINALYSIS W/O SCOPE: Mod: PBBFAC,,,

## 2024-06-13 PROCEDURE — 99213 OFFICE O/P EST LOW 20 MIN: CPT | Mod: PBBFAC | Performed by: ADVANCED PRACTICE MIDWIFE

## 2024-06-13 NOTE — PROGRESS NOTES
Return OB Visit    24 y.o. female  at 12w1d   She denies any complaints. Benedict screen drawn.  Denies any vaginal bleeding, leakage of fluid, cramping, contractions, or pressure.   Total weight gain/weight loss in pregnancy: 2.268 kg (5 lb)     Vitals  BP: 120/70  Pulse: 70  Weight: 95.7 kg (211 lb)  Prenatal  Fetal Heart Rate: 150  Movement: Absent  Edema  LLE Edema: None  RLE Edema: None  Facial: None  Additional Edema?: No    Prenatal Labs:  Lab Results   Component Value Date    GROUPTRH AB POS 2024    HGB 13.1 2024    HCT 41.0 2024     2024    SICKLE Negative 2024    HEPBSAG Non-Reactive 2024    [id number] Non-Reactive 2024    LABNGO Negative 2024    LABURIN 20,000 Enterobacter aerogenes (A) 2024       A: 12w1d           ICD-10-CM ICD-9-CM    1. History of gestational diabetes  Z86.32 V12.21       2. History of herpes genitalis  Z86.19 V12.09       3. Previous  delivery affecting pregnancy  O34.219 654.20       4. 12 weeks gestation of pregnancy  Z3A.12 V22.2 POCT URINALYSIS W/O SCOPE      5. Urinary frequency  R35.0 788.41 POCT URINALYSIS W/O SCOPE          The following were addressed during this visit:    1-8 Weeks  - Lifestyle Discussion   - Warning Signs   - Course of Care   - Physiology of Pregnancy   - Nutrition and Supplements   - Domestic Abuse Screen   - HIV Counseling   - Smoking Intervention   - SPAAD/Insurance Verification   - Importance of Exclusive Breastfeeding for First 6 Months   - Continuation of Breastfeeding of Complimentary after intro of solid foods   - Benefits of Breastfeeding     8-12 Weeks  - Review lab tests   - Genetic Counseling (NT/CVS/Amino)   - Non-pharmacologic Pain Relief Methods for Labor & Birth     -Benefits of breastfeeding   -Rooming In and Skin to Skin    P: Bleeding and  labor/labor precautions discussed.    Questions answered to desired level of satisfaction  Verbalized understanding to  all information and instructions provided.  Follow up in about 4 weeks (around 7/11/2024) for OBV.    Rosa Gamboa CNM, TERRENCE-BC

## 2024-07-17 ENCOUNTER — ROUTINE PRENATAL (OUTPATIENT)
Dept: OBSTETRICS AND GYNECOLOGY | Facility: CLINIC | Age: 24
End: 2024-07-17
Payer: MEDICAID

## 2024-07-17 VITALS
DIASTOLIC BLOOD PRESSURE: 70 MMHG | SYSTOLIC BLOOD PRESSURE: 110 MMHG | WEIGHT: 221 LBS | HEART RATE: 75 BPM | BODY MASS INDEX: 39.15 KG/M2

## 2024-07-17 DIAGNOSIS — Z3A.17 17 WEEKS GESTATION OF PREGNANCY: ICD-10-CM

## 2024-07-17 DIAGNOSIS — R35.0 URINARY FREQUENCY: ICD-10-CM

## 2024-07-17 DIAGNOSIS — Z86.19 HISTORY OF HERPES GENITALIS: ICD-10-CM

## 2024-07-17 DIAGNOSIS — O34.219 PREVIOUS CESAREAN DELIVERY AFFECTING PREGNANCY: ICD-10-CM

## 2024-07-17 DIAGNOSIS — Z86.32 HISTORY OF GESTATIONAL DIABETES: Primary | ICD-10-CM

## 2024-07-17 LAB
BILIRUB SERPL-MCNC: NORMAL MG/DL
BLOOD URINE, POC: NORMAL
CLARITY, POC UA: CLEAR
COLOR, POC UA: YELLOW
GLUCOSE UR QL STRIP: NORMAL
KETONES UR QL STRIP: NORMAL
LEUKOCYTE ESTERASE URINE, POC: NORMAL
NITRITE, POC UA: NORMAL
PH, POC UA: 5
PROTEIN, POC: NORMAL
SPECIFIC GRAVITY, POC UA: 1.02
UROBILINOGEN, POC UA: NORMAL

## 2024-07-17 PROCEDURE — 81003 URINALYSIS AUTO W/O SCOPE: CPT | Mod: PBBFAC | Performed by: ADVANCED PRACTICE MIDWIFE

## 2024-07-17 PROCEDURE — 99999PBSHW POCT URINALYSIS W/O SCOPE: Mod: PBBFAC,,,

## 2024-07-17 PROCEDURE — 99999 PR PBB SHADOW E&M-EST. PATIENT-LVL III: CPT | Mod: PBBFAC,,, | Performed by: ADVANCED PRACTICE MIDWIFE

## 2024-07-17 PROCEDURE — 99213 OFFICE O/P EST LOW 20 MIN: CPT | Mod: PBBFAC | Performed by: ADVANCED PRACTICE MIDWIFE

## 2024-07-17 PROCEDURE — 99213 OFFICE O/P EST LOW 20 MIN: CPT | Mod: S$PBB,TH,, | Performed by: ADVANCED PRACTICE MIDWIFE

## 2024-07-17 NOTE — PROGRESS NOTES
Return OB Visit    24 y.o. female  at 17w0d   She denies any complaints.  Denies any vaginal bleeding, leakage of fluid, cramping, contractions, or pressure.   Total weight gain/weight loss in pregnancy: 6.804 kg (15 lb)     Vitals  BP: 110/70  Pulse: 75  Weight: 100.2 kg (221 lb)  Prenatal  Fetal Heart Rate: 150  Movement: Absent  Edema  LLE Edema: None  RLE Edema: None  Facial: None  Additional Edema?: No    Prenatal Labs:  Lab Results   Component Value Date    GROUPTRH AB POS 2024    HGB 13.1 2024    HCT 41.0 2024     2024    SICKLE Negative 2024    HEPBSAG Non-Reactive 2024    [id number] Non-Reactive 2024    LABNGO Negative 2024    LABURIN 20,000 Enterobacter aerogenes (A) 2024       A: 17w0d           ICD-10-CM ICD-9-CM    1. History of gestational diabetes  Z86.32 V12.21 US OB 14+ Wks, TransAbd, Single Gestation      Glucose, 1Hr Post Prandial      2. History of herpes genitalis  Z86.19 V12.09 US OB 14+ Wks, TransAbd, Single Gestation      3. Previous  delivery affecting pregnancy  O34.219 654.20 US OB 14+ Wks, TransAbd, Single Gestation      4. 17 weeks gestation of pregnancy  Z3A.17 V22.2 POCT URINALYSIS W/O SCOPE      5. Urinary frequency  R35.0 788.41 POCT URINALYSIS W/O SCOPE          The following were addressed during this visit:    13-16 Weeks  - Anatomy Ultrasound   - Breastfeeding Concerns & Resources   - Importance of Early Skin to Skin Contact     -Benefits of breastfeeding   -Rooming In and Skin to Skin    P: Bleeding and  labor/labor precautions discussed.  Early 1hr GTT  Continue ASA 81mg daily    Questions answered to desired level of satisfaction  Verbalized understanding to all information and instructions provided.  Follow up in about 2 weeks (around 2024) for OBV, Ultrasound with Pupa, 1hr GTT.    Rosa Gamboa CNM, WHNP-BC

## 2024-07-31 ENCOUNTER — PATIENT MESSAGE (OUTPATIENT)
Dept: OBSTETRICS AND GYNECOLOGY | Facility: CLINIC | Age: 24
End: 2024-07-31
Payer: MEDICAID

## 2024-07-31 DIAGNOSIS — O24.419 GESTATIONAL DIABETES MELLITUS (GDM) IN SECOND TRIMESTER, GESTATIONAL DIABETES METHOD OF CONTROL UNSPECIFIED: Primary | ICD-10-CM

## 2024-08-01 ENCOUNTER — HOSPITAL ENCOUNTER (OUTPATIENT)
Dept: RADIOLOGY | Facility: HOSPITAL | Age: 24
Discharge: HOME | End: 2024-08-01
Attending: ADVANCED PRACTICE MIDWIFE
Payer: MEDICAID

## 2024-08-01 ENCOUNTER — ROUTINE PRENATAL (OUTPATIENT)
Dept: OBSTETRICS AND GYNECOLOGY | Facility: CLINIC | Age: 24
End: 2024-08-01
Payer: MEDICAID

## 2024-08-01 VITALS
BODY MASS INDEX: 39.15 KG/M2 | HEART RATE: 78 BPM | DIASTOLIC BLOOD PRESSURE: 70 MMHG | WEIGHT: 221 LBS | SYSTOLIC BLOOD PRESSURE: 110 MMHG

## 2024-08-01 DIAGNOSIS — Z3A.19 19 WEEKS GESTATION OF PREGNANCY: ICD-10-CM

## 2024-08-01 DIAGNOSIS — R35.0 URINARY FREQUENCY: ICD-10-CM

## 2024-08-01 DIAGNOSIS — O24.419 GESTATIONAL DIABETES MELLITUS (GDM) IN SECOND TRIMESTER, GESTATIONAL DIABETES METHOD OF CONTROL UNSPECIFIED: Primary | ICD-10-CM

## 2024-08-01 DIAGNOSIS — O34.219 PREVIOUS CESAREAN DELIVERY AFFECTING PREGNANCY: ICD-10-CM

## 2024-08-01 DIAGNOSIS — Z86.32 HISTORY OF GESTATIONAL DIABETES: ICD-10-CM

## 2024-08-01 DIAGNOSIS — Z86.19 HISTORY OF HERPES GENITALIS: ICD-10-CM

## 2024-08-01 LAB
BILIRUB SERPL-MCNC: NORMAL MG/DL
BLOOD URINE, POC: NORMAL
CLARITY, POC UA: CLEAR
COLOR, POC UA: YELLOW
GLUCOSE UR QL STRIP: NORMAL
KETONES UR QL STRIP: NORMAL
LEUKOCYTE ESTERASE URINE, POC: NORMAL
NITRITE, POC UA: NORMAL
PH, POC UA: 6
PROTEIN, POC: NORMAL
SPECIFIC GRAVITY, POC UA: 1.03
UROBILINOGEN, POC UA: NORMAL

## 2024-08-01 PROCEDURE — 99999PBSHW POCT URINALYSIS W/O SCOPE: Mod: PBBFAC,,,

## 2024-08-01 PROCEDURE — 76805 OB US >/= 14 WKS SNGL FETUS: CPT | Mod: TC

## 2024-08-01 PROCEDURE — 99999 PR PBB SHADOW E&M-EST. PATIENT-LVL III: CPT | Mod: PBBFAC,,, | Performed by: ADVANCED PRACTICE MIDWIFE

## 2024-08-01 PROCEDURE — 81003 URINALYSIS AUTO W/O SCOPE: CPT | Mod: PBBFAC | Performed by: ADVANCED PRACTICE MIDWIFE

## 2024-08-01 PROCEDURE — 59425 ANTEPARTUM CARE ONLY: CPT | Mod: S$PBB,TH,, | Performed by: ADVANCED PRACTICE MIDWIFE

## 2024-08-01 PROCEDURE — 99213 OFFICE O/P EST LOW 20 MIN: CPT | Mod: PBBFAC,25 | Performed by: ADVANCED PRACTICE MIDWIFE

## 2024-08-01 RX ORDER — METFORMIN HYDROCHLORIDE 500 MG/1
500 TABLET ORAL 2 TIMES DAILY WITH MEALS
Qty: 60 TABLET | Refills: 3 | Status: SHIPPED | OUTPATIENT
Start: 2024-08-01 | End: 2024-11-29

## 2024-08-01 NOTE — PROGRESS NOTES
Return OB Visit    24 y.o. female  at 19w1d   She denies any complaints.  Discussed glucose test results.  Pt has an appt with Diabetes Education.  Discussed tracking glucose levels fasting and 2 hour postprandial. Pt states she had to start insulin with previous pregnancy.   Reports good fetal movement or fluttering. Denies any vaginal bleeding, leakage of fluid, cramping, contractions, or pressure.   Total weight gain/weight loss in pregnancy: 6.804 kg (15 lb)     Vitals  BP: 110/70  Pulse: 78  Weight: 100.2 kg (221 lb)  Prenatal  Fetal Heart Rate: 140  Movement: Present  Edema  LLE Edema: None  RLE Edema: None  Facial: None  Additional Edema?: No    Prenatal Labs:  Lab Results   Component Value Date    GROUPTRH AB POS 2024    HGB 13.1 2024    HCT 41.0 2024     2024    SICKLE Negative 2024    HEPBSAG Non-Reactive 2024    [id number] Non-Reactive 2024    LABNGO Negative 2024    LABURIN 20,000 Enterobacter aerogenes (A) 2024       A: 19w1d           ICD-10-CM ICD-9-CM    1. Gestational diabetes mellitus (GDM) in second trimester, gestational diabetes method of control unspecified  O24.419 648.83 metFORMIN (GLUCOPHAGE) 500 MG tablet      2. Previous  delivery affecting pregnancy  O34.219 654.20       3. 19 weeks gestation of pregnancy  Z3A.19 V22.2 POCT URINALYSIS W/O SCOPE      metFORMIN (GLUCOPHAGE) 500 MG tablet      4. History of herpes genitalis  Z86.19 V12.09       5. Urinary frequency  R35.0 788.41 POCT URINALYSIS W/O SCOPE          The following were addressed during this visit:    17-20 Weeks  - Quickening   - Lifestyle   - Ultrasound   - Importance of Early and Frequent Breastfeeding   - Baby-led Feeding   - Frequent feeding to help assure optimal milk production     -Benefits of breastfeeding   -Rooming In and Skin to Skin    P: Bleeding, daily fetal kick counts, and  labor/labor precautions discussed.  Start Metformin 500mg  BID  Bring BS logs to appointments to review  Referral to MFM if BS not well controlled with diet and Metformin    Questions answered to desired level of satisfaction  Verbalized understanding to all information and instructions provided.  Follow up in about 4 weeks (around 8/29/2024) for OBV.    Rosa Gamboa CNM, LUMA-BC

## 2024-08-12 ENCOUNTER — PATIENT MESSAGE (OUTPATIENT)
Dept: OBSTETRICS AND GYNECOLOGY | Facility: CLINIC | Age: 24
End: 2024-08-12
Payer: MEDICAID

## 2024-08-13 ENCOUNTER — CLINICAL SUPPORT (OUTPATIENT)
Dept: DIABETES | Facility: CLINIC | Age: 24
End: 2024-08-13
Payer: MEDICAID

## 2024-08-13 VITALS — HEIGHT: 63 IN | BODY MASS INDEX: 38.95 KG/M2 | WEIGHT: 219.81 LBS

## 2024-08-13 DIAGNOSIS — O24.419 GESTATIONAL DIABETES MELLITUS (GDM) IN SECOND TRIMESTER, GESTATIONAL DIABETES METHOD OF CONTROL UNSPECIFIED: ICD-10-CM

## 2024-08-13 PROCEDURE — 99213 OFFICE O/P EST LOW 20 MIN: CPT | Mod: PBBFAC

## 2024-08-13 PROCEDURE — 99999 PR PBB SHADOW E&M-EST. PATIENT-LVL III: CPT | Mod: PBBFAC,,,

## 2024-08-13 PROCEDURE — G0108 DIAB MANAGE TRN  PER INDIV: HCPCS | Mod: PBBFAC | Performed by: INTERNAL MEDICINE

## 2024-08-13 PROCEDURE — 99999PBSHW PR PBB SHADOW TECHNICAL ONLY FILED TO HB: Mod: PBBFAC,,,

## 2024-08-13 NOTE — PROGRESS NOTES
"Diabetes Care Specialist Progress Note  Author: ADDIS LOVING RN  Date: 2024    Intake    Program Intake  Reason for Diabetes Program Visit:: Initial Diabetes Assessment (,  2nd  pregnancy with GD (GRACIELA: 2024) 20w6d.)  Current diabetes risk level::  (N/A GD (2nd preg with GD)  In the last 12 months, have you been to the ED or admitted to the hospital?  none  Permission to speak with others about care:: no    Current Diabetes Treatment: Oral Medications  Oral Medication Type/Dose: Metformin 500 MG tablet: 1 tab bid with food    Continuous Glucose Monitoring  Patient has CGM: No    Lab Results   Component Value Date    HGBA1C 5.5 2024     25 y/o referred by Rosa Gamboa CNM for Gestational Diabetes teaching.  She failed OGTT.      Weight: 99.7 kg (219 lb 12.8 oz)   Height: 5' 3" (160 cm)   Body mass index is 38.94 kg/m².    Lifestyle Coping Support & Clinical    Lifestyle/Coping/Support  Does anyone in your family have diabetes or does anyone in your family support you in your diabetes care?: Yes  How do you deal with stress/distress?: talk with supportive system  Learning Barriers:: None  Culture or Jain beliefs that may impact ability to access healthcare: No  Psychosocial/Coping Skills Assessment Completed: : Yes  Assessment indicates:: Adequate understanding  Area of need?: No     Diabetes Self-Management Skills Assessment    Medication Skills Assessment  Patient is able to identify current diabetes medications, dosages, and appropriate timing of medications.: yes  Patient reports problems or concerns with current medication regimen.: no  Patient is  aware that some diabetes medications can cause low blood sugar?: Yes  Medication Skills Assessment Completed:: Yes  Assessment indicates:: Instruction Needed  Area of need?: Yes    Diabetes Disease Process/Treatment Options  Diabetes Type?: Gestational  When were you diagnosed?: recently with failed OGTT  If previous diabetes education, " when/where:: GD teaching 3 years ago at [facility] GD  What are your goals for this education session?: blood glucose controlled, hopes to not require insulin.  Diabetes Disease Process/Treatment Options: Skills Assessment Completed: Yes  Assessment indicates:: Instruction Needed  Area of need?: Yes    Nutrition/Healthy Eating  Meal Plan 24 Hour Recall - Breakfast: eggs, 1/2 pancake with butter no syrup, diet drink  Meal Plan 24 Hour Recall - Lunch: ribs, mashed potatoes with gravy, peas, diet drink  Meal Plan 24 Hour Recall - Dinner: salad with ranch dressing, (letuce, cheese, ranch dressing) diet drink or water  Meal Plan 24 Hour Recall - Snack: bag of chips  Meal Plan 24 Hour Recall - Beverage: water / diet drink  Who shops/cooks?: self  Patient can identify foods that impact blood sugar.: yes  Challenges to healthy eating:: other (see comments) (Picky eater, 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.)  Nutrition/Healthy Eating Skills Assessment Completed:: Yes  Assessment indicates:: Instruction Needed, Knowledge deficit  Area of need?: Yes    Physical Activity/Exercise  Patient's daily activity level:: constantly moving  Patient can identify forms of physical activity.: yes  Physical Activity/Exercise Skills Assessment Completed: : Yes  Assessment indicates:: Instruction Needed  Area of need?: Yes    Home Blood Glucose Monitoring  Patient states that blood sugar is checked at home daily.: yes  Monitoring Method:: home glucometer  Home glucometer meter type:: A True Metrics Glucose meter kit from [employer] was provided today.  A random Blood glucose was 81mg/dl  What are your blood glucose targets?: < 95 fasting and <120 2 hours PP  Home Blood Glucose Monitoring Skills Assessment Completed: : Yes  Assessment indicates:: Instruction Needed  Area of need?: Yes    Acute Complications  Acute Complications Skills Assessment Completed: : Yes  Assessment indicates::  Instruction Needed, Knowledge deficit  Area of need?: Yes    Chronic Complications  Chronic Complications Skills Assessment Completed: : Yes  Assessment indicates:: Knowledge deficit, Instruction Needed  Area of need?: Yes      Assessment Summary and Plan    Based on today's diabetes care assessment, the following areas of need were identified:          2024       Areas of Need   Medications/Current Diabetes Treatment Yes - Provided review of current diabetes medication action, dosage, frequency, side effects, and storage guidelines. Discussed guidelines for preventing, detecting and treating hypoglycemia and hyperglycemia. Reviewed the importance of meal and medication timing with diabetes mediations for prevention of acute complications and maximum drug benefit.  Discussed calling HCP if not tolerating medication or if blood sugar is uncontrolled with current regimen.      Lifestyle Coping Support No - Has good family support     Diabetes Disease Process/Treatment Options Yes - Reviewed gestational diabetes pathophysiology, signs and symptoms, risk factors and treatment guidelines. Handout on Gestational Diabetes provided.      Nutrition/Healthy Eating Yes - See Care Plan     Physical Activity/Exercise Yes -  Discussed role of physical activity on reducing insulin resistance and improvement in overall glycemic control. Provided a handout on GD and Exercise and when it's not safe to exercise and call your HCP.     Home Blood Glucose Monitoring Yes - See Care Plan     Acute Complications Yes Discussed risk to her and baby during pregnancy if blood sugar isn't controlled. Risk to baby: grow too large, blood sugar low at birth, low oxygen, stillbirth, Jaundice, injury during delivery. Risk to her: high blood pressure, infections, preeclampsia, . Handout provided.      Chronic Complications Yes - Discussed future risk to her and baby if blood sugar is not controlled during pregnancy.  Baby: obese and at  risk for diabetes in the future and same for her if she doesn't make realistic lifestyle and behavior changes. Post pregnancy: (Continue eating healthy, exercising regularly, keep weight down.  Discussed prevention by getting blood sugar checked in 4-6 weeks after delivery and having regular checkups by HCP every 1-3 years as recommended.)  Handout provided.        Today's interventions were provided through individual discussion, instruction, and written materials were provided.      Patient verbalized understanding of instruction and written materials.  Pt was able to return back demonstration of instructions today. Patient understood key points, needs reinforcement and further instruction.     Diabetes Self-Management Care Plan:    Today's Diabetes Self-Management Care Plan was developed with Alida's input. Alida has agreed to work toward the following goal(s) to improve his/her overall diabetes control.      Care Plan: Diabetes Management        Problem: Healthy Eating         Goal: Eat 3 meals daily with 30-60g/2-4 servings of carbohydrates per meal    Start Date: 8/13/2024   Expected End Date: 8/29/2024   Barriers: No Barriers Identified   Note:    Reviewed the sources and role of Carbohydrate, Protein, and Fat and how each nutrient impact blood sugar. Provided meal-planning instruction via food lists, plate method, food labels. Reviewed micro/macronutrient effect on health management. Discussed carbohydrate counting and spacing. Reviewed principles of energy metabolism to assist weight and health management. Discussed strategies for healthier dining out and replacing sugary beverages with water and other noncaloric, sugar free options.  A diet on GD was provided including foods recommended and foods not recommended while Pregnant.      Task: Review the importance of balancing carbohydrates with each meal using portion control techniques to count servings of carbohydrate and label reading to identify serving  size and amount of total carbs per serving. Completed 8/13/2024        Task: Provided Sample plate method and reviewed the use of the plate to estimate amounts of carbohydrate per meal. Completed 8/13/2024        Problem: Blood Glucose Self-Monitoring         Goal: Patient agrees to check her blood sugar 4 x day (fasting and 2 hours pp) and log    Start Date: 8/13/2024   Expected End Date: 8/29/2024   Barriers: No Barriers Identified   Note:    Reviewed benefits, techniques of self-monitoring blood glucose. Discussed appropriate timing and frequency to SMBG, education on parameters on when to notify provider and advised patient to bring logs to all appts with PCP/Endocrinologist/Diabetes Care Specialist. Handout provided on factors that affect blood sugar and blood sugar parameters for people with GD. (<95 fasting and <120 2 hours PP)  A random blood sugar was 81 mg/dl in my office.       Task: Provided patient with a meter today and sent Rx request to provider to send to patients pharmacy. Completed 8/13/2024        Task: Instructed on how to self-monitor blood glucose using a home glucometer, how to properly dispose of used strips and lancets after use, and how to appropriately store meter and supplies. Completed 8/13/2024        Task: Provided patient with blood glucose logs, reviewed appropriate timing and frequency to SMBG, education on parameters on when to notify provider and advised patient to bring logs to all appts with PCP/Endocrinologist/Diabetes Care Specialist. Completed 8/13/2024          Follow Up Plan     Follow up in about 16 days (around 8/29/2024).    Today's care plan and follow up schedule was discussed with patient.  Alida verbalized understanding of the care plan, goals, and agrees to follow up plan.        The patient was encouraged to communicate with his/her health care provider/physician and care team regarding his/her condition(s) and treatment.  I provided the patient with my contact  information today and encouraged to contact me via phone or [facility] Patient Portal as needed.     Length of Visit   Total Time: 60 Minutes

## 2024-08-13 NOTE — LETTER
August 13, 2024      Rosa Gamboa CNM  [address] [facility] [place] [address]         Patient: Alida Tompkins   MR Number: [id number]   YOB: 2000   Date of Visit: 8/13/2024       Dear Dr. Gamboa:    Thank you for referring Alida for diabetes self-management education and support services. She was seen today for an initial visit and has a follow up on 8/29/2024.   She set the goals below.     Patient Outcomes:    A1c Status:   Lab Results   Component Value Date    HGBA1C 5.5 05/16/2024     Care Plan: Diabetes Management        Problem: Healthy Eating         Goal: Eat 3 meals daily with 30-60g/2-4 servings of carbohydrates per meal    Start Date: 8/13/2024   Expected End Date: 8/29/2024   Barriers: No Barriers Identified   Note:    Reviewed the sources and role of Carbohydrate, Protein, and Fat and how each nutrient impact blood sugar. Provided meal-planning instruction via food lists, plate method, food labels. Reviewed micro/macronutrient effect on health management. Discussed carbohydrate counting and spacing. Reviewed principles of energy metabolism to assist weight and health management. Discussed strategies for healthier dining out and replacing sugary beverages with water and other noncaloric, sugar free options.  A diet on GD provided including foods recommended and foods not recommended while Pregnant.      Task: Review the importance of balancing carbohydrates with each meal using portion control techniques to count servings of carbohydrate and label reading to identify serving size and amount of total carbs per serving. Completed 8/13/2024        Task: Provided Sample plate method and reviewed the use of the plate to estimate amounts of carbohydrate per meal. Completed 8/13/2024        Problem: Blood Glucose Self-Monitoring         Goal: Patient agrees to check her blood sugar 4 x day (fasting and 2 hours pp) and log    Start Date: 8/13/2024   Expected End Date: 8/29/2024   Barriers: No Barriers  Identified   Note:    Reviewed benefits, techniques of self-monitoring blood glucose. Discussed appropriate timing and frequency to SMBG, education on parameters on when to notify provider and advised patient to bring logs to all appts with PCP/Endocrinologist/Diabetes Care Specialist. Handout provided on factors that affect blood sugar and blood sugar parameters for people with GD. (<95 fasting and <120 2 hours PP)   A random blood sugar was 81 mg/dl in my office.      Task: Provided patient with a meter today and sent Rx request to provider to send to patients pharmacy. Completed 8/13/2024        Task: Instructed on how to self-monitor blood glucose using a home glucometer, how to properly dispose of used strips and lancets after use, and how to appropriately store meter and supplies. Completed 8/13/2024        Task: Provided patient with blood glucose logs, reviewed appropriate timing and frequency to SMBG, education on parameters on when to notify provider and advised patient to bring logs to all appts with PCP/Endocrinologist/Diabetes Care Specialist. Completed 8/13/2024        Follow up:   Alida to attend medical appointments as scheduled  Alida to update you on her DM education progress as needed      If you have questions, please do not hesitate to call me. I look forward to providing additional education and support as needed.    Sincerely,  Agnes Chaparro, JOSE ANGEL  Diabetes Care and

## 2024-08-29 ENCOUNTER — CLINICAL SUPPORT (OUTPATIENT)
Dept: DIABETES | Facility: CLINIC | Age: 24
End: 2024-08-29
Payer: MEDICAID

## 2024-08-29 ENCOUNTER — ROUTINE PRENATAL (OUTPATIENT)
Dept: OBSTETRICS AND GYNECOLOGY | Facility: CLINIC | Age: 24
End: 2024-08-29
Payer: MEDICAID

## 2024-08-29 VITALS — BODY MASS INDEX: 39.48 KG/M2 | HEIGHT: 63 IN | WEIGHT: 222.81 LBS

## 2024-08-29 VITALS
DIASTOLIC BLOOD PRESSURE: 74 MMHG | WEIGHT: 223 LBS | HEART RATE: 94 BPM | BODY MASS INDEX: 39.5 KG/M2 | SYSTOLIC BLOOD PRESSURE: 122 MMHG

## 2024-08-29 DIAGNOSIS — O24.419 GESTATIONAL DIABETES MELLITUS (GDM) IN SECOND TRIMESTER, GESTATIONAL DIABETES METHOD OF CONTROL UNSPECIFIED: Primary | ICD-10-CM

## 2024-08-29 DIAGNOSIS — O24.415 GESTATIONAL DIABETES MELLITUS (GDM) IN THIRD TRIMESTER CONTROLLED ON ORAL HYPOGLYCEMIC DRUG: Primary | ICD-10-CM

## 2024-08-29 DIAGNOSIS — Z3A.23 23 WEEKS GESTATION OF PREGNANCY: ICD-10-CM

## 2024-08-29 DIAGNOSIS — R35.0 URINARY FREQUENCY: ICD-10-CM

## 2024-08-29 DIAGNOSIS — O34.219 PREVIOUS CESAREAN DELIVERY AFFECTING PREGNANCY: ICD-10-CM

## 2024-08-29 DIAGNOSIS — Z86.19 HISTORY OF HERPES GENITALIS: ICD-10-CM

## 2024-08-29 DIAGNOSIS — E55.9 VITAMIN D DEFICIENCY: ICD-10-CM

## 2024-08-29 LAB
BILIRUB SERPL-MCNC: NORMAL MG/DL
BLOOD URINE, POC: NORMAL
CLARITY, POC UA: CLEAR
COLOR, POC UA: NORMAL
GLUCOSE UR QL STRIP: NORMAL
KETONES UR QL STRIP: NORMAL
LEUKOCYTE ESTERASE URINE, POC: NORMAL
NITRITE, POC UA: NORMAL
PH, POC UA: 5
PROTEIN, POC: NORMAL
SPECIFIC GRAVITY, POC UA: 1.03
UROBILINOGEN, POC UA: NORMAL

## 2024-08-29 PROCEDURE — 99212 OFFICE O/P EST SF 10 MIN: CPT | Mod: PBBFAC

## 2024-08-29 PROCEDURE — 99999PBSHW PR PBB SHADOW TECHNICAL ONLY FILED TO HB: Mod: PBBFAC,,,

## 2024-08-29 PROCEDURE — 99999PBSHW POCT URINALYSIS W/O SCOPE: Mod: PBBFAC,,,

## 2024-08-29 PROCEDURE — 99999 PR PBB SHADOW E&M-EST. PATIENT-LVL III: CPT | Mod: PBBFAC,,, | Performed by: ADVANCED PRACTICE MIDWIFE

## 2024-08-29 PROCEDURE — 59425 ANTEPARTUM CARE ONLY: CPT | Mod: S$PBB,TH,, | Performed by: ADVANCED PRACTICE MIDWIFE

## 2024-08-29 PROCEDURE — G0108 DIAB MANAGE TRN  PER INDIV: HCPCS | Mod: PBBFAC | Performed by: INTERNAL MEDICINE

## 2024-08-29 PROCEDURE — 81003 URINALYSIS AUTO W/O SCOPE: CPT | Mod: PBBFAC | Performed by: ADVANCED PRACTICE MIDWIFE

## 2024-08-29 PROCEDURE — 99999 PR PBB SHADOW E&M-EST. PATIENT-LVL II: CPT | Mod: PBBFAC,,,

## 2024-08-29 PROCEDURE — 99213 OFFICE O/P EST LOW 20 MIN: CPT | Mod: PBBFAC | Performed by: ADVANCED PRACTICE MIDWIFE

## 2024-08-29 RX ORDER — ACETAMINOPHEN 500 MG
5000 TABLET ORAL DAILY
Qty: 30 TABLET | Refills: 11 | Status: SHIPPED | OUTPATIENT
Start: 2024-08-29

## 2024-08-29 RX ORDER — ASPIRIN 81 MG/1
81 TABLET ORAL DAILY
COMMUNITY

## 2024-08-29 NOTE — PROGRESS NOTES
"Diabetes Care Specialist Follow-up Note  Author: ADDIS LOVING RN  Date: 2024    Program Intake  Reason for Diabetes Program Visit:: Post Program Follow-Up (Patient verbalizes understanding of GD and will call me as needed.  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    2 hours pp breakfast, lunch, dinner: All < 120.     (, 2nd pregnancy with GD (GRACIELA: 2024) 23w1d.)     23 y/o referred by Rosa Gamboa CNM for Gestational Diabetes teaching.       Weight: 99.7 kg (223 lb)   Height: 5' 3" (160 cm)   Body mass index is 39.50 kg/m².    Clinical    Current Diabetes Treatment: Oral Medications  Oral Medication Type/Dose: Metformin 500 MG tablet: 1 tab bid with food     Nutrition/Healthy Eating  Meal Plan 24 Hour Recall - Breakfast: Sausage and Cheese biscuit , diet coartur  Meal Plan 24 Hour Recall - Lunch: Pizza ([employer] sausage and pepperoni (wasn't thin crust)  diet jameel izaguirre (Discussed getting thin crust in the future)  Meal Plan 24 Hour Recall - Dinner: Skipped dinner upset after having test  (Reports  not usually skipping meals)  Meal Plan 24 Hour Recall - Snack: dill pickles mcmanus  Meal Plan 24 Hour Recall - Beverage: water, diet drinks, Sugar Free sticks  in water  Who shops/cooks?: Self           2024      Areas of Need   Medications/Current Diabetes Treatment Patient verbalizes understanding of diabetes medication (Metformin), dosage, and appropriate timing. She verbalizes understanding of the purpose, denies missing it and  tolerating it without side effects.  She verbalizes understanding to contact HCP if her current " treatment isn't working, or she has concerns.     Diabetes Disease Process/Treatment Options Patient verbalized understanding of (Gestational diabetes), s/s, risk factors and treatment options and how to self-manage it successfully.     Nutrition/Healthy Eating See Care Plan     Physical Activity/Exercise Patient verbalizes understanding that physical activity helps control your blood glucose levels and provides other health benefits during pregnancy and after the baby is born.     Home Blood Glucose Monitoring See Care Plan     Acute Complications Patient verbalizes understanding of risk to her and baby during pregnancy if blood sugar isn't controlled. Risk to baby: grow too large, blood sugar low at birth, low oxygen, stillbirth, Jaundice, injury during delivery. Risk to her:  labor, high blood pressure, infections, preeclampsia.     Chronic Complications Patient verbalizes future risk to her and baby post-delivery:  Baby: obese and at risk for developing diabetes in the future.  She plans to continue with lifestyle changes and to follow up with HCP for blood sugar checks to delay or prevent developing diabetes in the future.          Today's interventions were provided through individual discussion, instruction, and written materials were provided.    Patient verbalized understanding of instruction and written materials.  Pt was able to return back demonstration of instructions today. Patient understood key points, needs reinforcement and further instruction.     Diabetes Self-Management Care Plan Review:  Diabetes Self-Management Care Plan Review and Evaluation of Progress:    During today's follow-up Alida's Diabetes Self-Management Care Plan progress was reviewed and progress was evaluated including his/her input. Alida has agreed to continue his/her journey to improve/maintain overall diabetes control by continuing to set health goals. See care plan progress below.      Care Plan: Diabetes Management         Problem: Healthy Eating         Goal: Eat 3 meals daily with 30-60g/2-4 servings of carbohydrates per meal Completed 8/29/2024   Start Date: 8/13/2024   Expected End Date: 8/29/2024   This Visit's Progress: Met   Barriers: No Barriers Identified   Note:    Patient verbalized understanding on the source and role of Carbohydrate, Protein, and Fat and how each nutrient impacts blood sugar levels during pregnancy.          Problem: Blood Glucose Self-Monitoring         Goal: Patient agrees to check her blood sugar 4 x day (fasting and 2 hours pp) and log Completed 8/29/2024   Start Date: 8/13/2024   Expected End Date: 8/29/2024   This Visit's Progress: Met   Barriers: No Barriers Identified   Note:    Patient verbalizes understanding of appropriate timing and frequency to check SMBG. She verbalized understanding of factors that affect glucose and when to notify provider if parameters are not in range.       Follow Up Plan     Follow up if symptoms worsen or fail to improve.    Today's care plan and follow up schedule was discussed with patient.  Alida verbalized understanding of the care plan, goals, and agrees to follow up plan.        The patient was encouraged to communicate with his/her health care provider/physician and care team regarding his/her condition(s) and treatment.  I provided the patient with my contact information today and encouraged to contact me via phone or [facility] Patient Portal as needed.     Length of Visit   Total Time: 30 Minutes

## 2024-08-29 NOTE — PROGRESS NOTES
Return OB Visit    24 y.o. female  at 23w1d   She denies any complaints. FBS  2hr PP   Reports good fetal movement or fluttering. Denies any vaginal bleeding, leakage of fluid, cramping, contractions, or pressure.   Total weight gain/weight loss in pregnancy: 7.711 kg (17 lb)     Vitals  BP: 122/74  Pulse: 94  Weight: 101.2 kg (223 lb)  Prenatal  Fetal Heart Rate: 150  Movement: Present  Edema  LLE Edema: None  RLE Edema: None  Facial: None  Additional Edema?: No    Current Outpatient Medications:     aspirin (ECOTRIN) 81 MG EC tablet, Take 81 mg by mouth once daily., Disp: , Rfl:     cholecalciferol, vitamin D3, (VITAMIN D3) 125 mcg (5,000 unit) Tab, Take 1 tablet (5,000 Units total) by mouth once daily., Disp: 30 tablet, Rfl: 11    metFORMIN (GLUCOPHAGE) 500 MG tablet, Take 1 tablet (500 mg total) by mouth 2 (two) times daily with meals., Disp: 60 tablet, Rfl: 3    prenatal vit no.124/iron/folic (PRENATAL VITAMIN ORAL), Take 1 each by mouth Daily., Disp: , Rfl:     valACYclovir (VALTREX) 1000 MG tablet, Take 1 tablet (1,000 mg total) by mouth once daily., Disp: 30 tablet, Rfl: 11     Prenatal Labs:  Lab Results   Component Value Date    GROUPTRH AB POS 2024    HGB 13.1 2024    HCT 41.0 2024     2024    SICKLE Negative 2024    HEPBSAG Non-Reactive 2024    [id number] Non-Reactive 2024    LABNGO Negative 2024    LABURIN 20,000 Enterobacter aerogenes (A) 2024     A: 23w1d           ICD-10-CM ICD-9-CM    1. Gestational diabetes mellitus (GDM) in second trimester, gestational diabetes method of control unspecified  O24.419 648.83 Hemoglobin A1C      CBC Auto Differential      cholecalciferol, vitamin D3, (VITAMIN D3) 125 mcg (5,000 unit) Tab      Comprehensive Metabolic Panel      2. Previous  delivery affecting pregnancy  O34.219 654.20       3. 23 weeks gestation of pregnancy  Z3A.23 V22.2 cholecalciferol, vitamin D3, (VITAMIN D3)  125 mcg (5,000 unit) Tab      4. History of herpes genitalis  Z86.19 V12.09       5. Urinary frequency  R35.0 788.41 POCT URINALYSIS W/O SCOPE      6. Vitamin D deficiency  E55.9 268.9 Vitamin D      cholecalciferol, vitamin D3, (VITAMIN D3) 125 mcg (5,000 unit) Tab          The following were addressed during this visit:    21-24 Weeks  -  Labor Signs   - Travel During Pregnancy   - Gestational diabetes screening protocol   - Effective Position and Latch   - Risks of Formula Use   - Risks of pacifier use     -Benefits of breastfeeding   -Rooming In and Skin to Skin    P: Bleeding, daily fetal kick counts, and  labor/labor precautions discussed.  Discussed diet/exercise.  Continue glucose monitoring as recommended.    Questions answered to desired level of satisfaction  Verbalized understanding to all information and instructions provided.  Follow up in about 4 weeks (around 2024) for OBV.    Rosa Gamboa CNM, TERRENCE-BC

## 2024-08-29 NOTE — LETTER
August 29, 2024      Rosa Gamboa CNM  [address] [facility] [place] [address]         Patient: Alida Tompkins   MR Number: [id number]   YOB: 2000   Date of Visit: 8/29/2024       Dear Rosa Gamboa:    Thank you for referring Alida for diabetes self-management education and support services. She has completed all components of our Diabetes Management Program. Below is a summary of her clinical outcomes and goal progress. She plans to call me as needed.  My complete note is in Epic.     Patient Outcomes:    A1c Status:   Lab Results   Component Value Date    HGBA1C 5.5 05/16/2024     Problem: Healthy Eating         Goal: Eat 3 meals daily with 30-60g/2-4 servings of carbohydrates per meal Completed 8/29/2024   Start Date: 8/13/2024   Expected End Date: 8/29/2024   This Visit's Progress: Met   Barriers: No Barriers Identified   Note:    Patient verbalized understanding on the source and role of Carbohydrate, Protein, and Fat and how each nutrient impacts blood sugar levels during pregnancy.          Problem: Blood Glucose Self-Monitoring         Goal: Patient agrees to check her blood sugar 4 x day (fasting and 2 hours pp) and log Completed 8/29/2024   Start Date: 8/13/2024   Expected End Date: 8/29/2024   This Visit's Progress: Met   Barriers: No Barriers Identified   Note:    Patient verbalizes understanding of appropriate timing and frequency to check SMBG. She verbalized understanding of factors that affect glucose and when to notify provider if parameters are not in range.       Follow up:   Alida to attend medical appointments as scheduled  Alida to update you on her DM education progress as needed      If you have questions, please do not hesitate to call me. I look forward to providing additional education and support as needed.    Sincerely,  Agnes Chaparro, JOSE ANGEL  Diabetes Care and

## 2024-09-15 ENCOUNTER — HOSPITAL ENCOUNTER (EMERGENCY)
Facility: HOSPITAL | Age: 24
Discharge: HOME | End: 2024-09-15
Payer: MEDICAID

## 2024-09-15 VITALS
RESPIRATION RATE: 20 BRPM | SYSTOLIC BLOOD PRESSURE: 125 MMHG | TEMPERATURE: 98 F | HEIGHT: 65 IN | HEART RATE: 96 BPM | WEIGHT: 220.63 LBS | DIASTOLIC BLOOD PRESSURE: 74 MMHG | BODY MASS INDEX: 36.76 KG/M2 | OXYGEN SATURATION: 95 %

## 2024-09-15 DIAGNOSIS — K76.0 FATTY LIVER: ICD-10-CM

## 2024-09-15 DIAGNOSIS — R42 DIZZINESS: Primary | ICD-10-CM

## 2024-09-15 DIAGNOSIS — R55 NEAR SYNCOPE: ICD-10-CM

## 2024-09-15 DIAGNOSIS — I10 PRIMARY HYPERTENSION: ICD-10-CM

## 2024-09-15 LAB
BASOPHILS # BLD AUTO: 0.04 K/UL (ref 0–0.2)
BASOPHILS NFR BLD AUTO: 0.3 % (ref 0–1)
DIFFERENTIAL METHOD BLD: ABNORMAL
EOSINOPHIL # BLD AUTO: 0.1 K/UL (ref 0–0.5)
EOSINOPHIL NFR BLD AUTO: 0.8 % (ref 1–4)
ERYTHROCYTE [DISTWIDTH] IN BLOOD BY AUTOMATED COUNT: 13 % (ref 11.5–14.5)
HCT VFR BLD AUTO: 36.6 % (ref 38–47)
HGB BLD-MCNC: 11.7 G/DL (ref 12–16)
IMM GRANULOCYTES # BLD AUTO: 0.12 K/UL (ref 0–0.04)
IMM GRANULOCYTES NFR BLD: 0.9 % (ref 0–0.4)
LYMPHOCYTES # BLD AUTO: 2.33 K/UL (ref 1–4.8)
LYMPHOCYTES NFR BLD AUTO: 18.3 % (ref 27–41)
MCH RBC QN AUTO: 26.5 PG (ref 27–31)
MCHC RBC AUTO-ENTMCNC: 32 G/DL (ref 32–36)
MCV RBC AUTO: 82.8 FL (ref 80–96)
MONOCYTES # BLD AUTO: 1.28 K/UL (ref 0–0.8)
MONOCYTES NFR BLD AUTO: 10.1 % (ref 2–6)
MPC BLD CALC-MCNC: 11.8 FL (ref 9.4–12.4)
NEUTROPHILS # BLD AUTO: 8.83 K/UL (ref 1.8–7.7)
NEUTROPHILS NFR BLD AUTO: 69.6 % (ref 53–65)
NRBC # BLD AUTO: 0 X10E3/UL
NRBC, AUTO (.00): 0 %
PLATELET # BLD AUTO: 250 K/UL (ref 150–400)
RBC # BLD AUTO: 4.42 M/UL (ref 4.2–5.4)
WBC # BLD AUTO: 12.7 K/UL (ref 4.5–11)

## 2024-09-15 PROCEDURE — 96360 HYDRATION IV INFUSION INIT: CPT

## 2024-09-15 PROCEDURE — 99284 EMERGENCY DEPT VISIT MOD MDM: CPT | Mod: 25

## 2024-09-15 PROCEDURE — 85025 COMPLETE CBC W/AUTO DIFF WBC: CPT | Performed by: SPECIALIST

## 2024-09-15 PROCEDURE — 63600175 PHARM REV CODE 636 W HCPCS: Performed by: SPECIALIST

## 2024-09-15 PROCEDURE — 36415 COLL VENOUS BLD VENIPUNCTURE: CPT | Performed by: SPECIALIST

## 2024-09-15 RX ADMIN — SODIUM CHLORIDE, POTASSIUM CHLORIDE, SODIUM LACTATE AND CALCIUM CHLORIDE 1000 ML: 600; 310; 30; 20 INJECTION, SOLUTION INTRAVENOUS at 04:09

## 2024-09-15 NOTE — ED PROVIDER NOTES
Encounter Date: 9/15/2024    AUDREY Physician: Black Felton   Primary OBGYN:Rosa Gamboa CNM    Admit Diagnosis/Chief Complaint:  Dizziness  History     Chief Complaint   Patient presents with    Dizziness     The patient is 24-year-old  3 para 2 with intrauterine pregnancy at 25 weeks who presented with a history of dizziness this afternoon.  The patient had 1 episode this afternoon.  She states that this has been ongoing and has discussed that with her obstetrician.        Obstetric HPI:  Patient reports None contractions, active fetal movement, absent vaginal bleeding , absent loss of fluid      Objective:     Vital Signs (Most Recent):  Temp: 98.3 °F (36.8 °C) (09/15/24 1628)  Pulse: 96 (09/15/24 1628)  Resp: 20 (09/15/24 1628)  BP: 125/74 (09/15/24 1628)  SpO2: 95 % (09/15/24 1628) Vital Signs (24h Range):  Temp:  [98.3 °F (36.8 °C)] 98.3 °F (36.8 °C)  Pulse:  [96] 96  Resp:  [20] 20  SpO2:  [95 %] 95 %  BP: (125)/(74) 125/74     Weight: 100.1 kg (220 lb 9.6 oz)  Body mass index is 36.71 kg/m².    FHT: 130Cat 1 (reassuring)  TOCO:  Q  minutes    No intake or output data in the 24 hours ending 09/15/24 1715    Cervical Exam:  Dilation:  0  Effacement:  0%  Station: -3  Presentation: Vertex     Significant Labs:  Recent Lab Results       None          Review of patient's allergies indicates:  No Known Allergies  Past Medical History:   Diagnosis Date    Bacterial vaginosis 2022    Elevated blood sugar 2022    Encounter for cervical Pap smear with pelvic exam 8/15/2022    Encounter for initial prescription of vaginal ring hormonal contraceptive 8/15/2022    Gestational diabetes mellitus (GDM) in third trimester 11/15/2021    Gestational diabetes mellitus (GDM) in third trimester 11/15/2021    Gestational hypertension, third trimester 11/15/2021    Herpes simplex virus (HSV) infection     High risk medication use 2022    S/P  section 2022    Vaginal discharge 2022     Past  Surgical History:   Procedure Laterality Date     SECTION N/A 2022    Procedure:  SECTION;  Surgeon: Michelle Cuadra MD;  Location: [facility];  Service: OB/GYN;  Laterality: N/A;     Family History   Problem Relation Name Age of Onset    Hypertension Father      Diabetes Mother      Hypertension Mother      No Known Problems Brother      Hypertension Brother      No Known Problems Daughter      Breast cancer Neg Hx      Colon cancer Neg Hx      Ovarian cancer Neg Hx       Social History     Tobacco Use    Smoking status: Never     Passive exposure: Never    Smokeless tobacco: Never   Substance Use Topics    Alcohol use: Not Currently    Drug use: Not Currently     Types: Marijuana     Comment: last time she used was about 2-3 weeks ago     Review of Systems   Constitutional:  Negative for fever.   HENT:  Negative for sore throat.    Respiratory:  Negative for shortness of breath.    Cardiovascular:  Negative for chest pain.   Gastrointestinal:  Negative for nausea.   Genitourinary:  Negative for dysuria.   Musculoskeletal:  Negative for back pain.   Skin:  Negative for rash.   Neurological:  Positive for dizziness. Negative for weakness.   Hematological:  Does not bruise/bleed easily.   Psychiatric/Behavioral: Negative.         Physical Exam     Initial Vitals [09/15/24 1628]   BP Pulse Resp Temp SpO2   125/74 96 20 98.3 °F (36.8 °C) 95 %      MAP       --         Physical Exam    Constitutional: She appears well-developed and well-nourished.   HENT:   Head: Normocephalic and atraumatic.   Eyes: EOM are normal. Pupils are equal, round, and reactive to light.   Neck: Neck supple.   Normal range of motion.  Cardiovascular:  Normal rate, regular rhythm and normal heart sounds.           Pulmonary/Chest: Breath sounds normal.   Abdominal: Abdomen is soft. Bowel sounds are normal.   Musculoskeletal:         General: Normal range of motion.      Cervical back: Normal range of motion and neck  supple.     Neurological: She is alert and oriented to person, place, and time.   Skin: Skin is warm.   Psychiatric: She has a normal mood and affect. Her behavior is normal. Thought content normal.         ED Course     Labs Reviewed   CBC W/ AUTO DIFFERENTIAL    Narrative:     The following orders were created for panel order CBC Auto Differential.  Procedure                               Abnormality         Status                     ---------                               -----------         ------                     CBC with Differential[[id number]]                           In process                   Please view results for these tests on the individual orders.   CBC WITH DIFFERENTIAL        Imaging Results    None          Medical Decision Making  Amount and/or Complexity of Data Reviewed  Labs: ordered.       Medications   lactated ringers bolus 1,000 mL (1,000 mLs Intravenous New Bag 9/15/24 1640)         Patient presents with a history of dizziness.  Plan is to hydrate her.  We will check a complete blood count.                     Clinical Impression:   Final diagnoses:  [R42] Dizziness (Primary)  [R55] Near syncope  [I10] Primary hypertension  [K76.0] Fatty liver               Black Felton MD  09/15/24 4113

## 2024-09-24 ENCOUNTER — ROUTINE PRENATAL (OUTPATIENT)
Dept: OBSTETRICS AND GYNECOLOGY | Facility: CLINIC | Age: 24
End: 2024-09-24
Payer: MEDICAID

## 2024-09-24 VITALS
DIASTOLIC BLOOD PRESSURE: 80 MMHG | HEART RATE: 77 BPM | SYSTOLIC BLOOD PRESSURE: 120 MMHG | WEIGHT: 225 LBS | BODY MASS INDEX: 37.44 KG/M2

## 2024-09-24 DIAGNOSIS — Z3A.26 26 WEEKS GESTATION OF PREGNANCY: ICD-10-CM

## 2024-09-24 DIAGNOSIS — O34.219 PREVIOUS CESAREAN DELIVERY AFFECTING PREGNANCY: ICD-10-CM

## 2024-09-24 DIAGNOSIS — O24.419 GESTATIONAL DIABETES MELLITUS (GDM) IN SECOND TRIMESTER, GESTATIONAL DIABETES METHOD OF CONTROL UNSPECIFIED: Primary | ICD-10-CM

## 2024-09-24 DIAGNOSIS — Z86.19 HISTORY OF HERPES GENITALIS: ICD-10-CM

## 2024-09-24 DIAGNOSIS — R35.0 URINARY FREQUENCY: ICD-10-CM

## 2024-09-24 LAB
BILIRUB SERPL-MCNC: NORMAL MG/DL
BLOOD URINE, POC: NORMAL
CLARITY, UA: CLEAR
COLOR, UA: NORMAL
GLUCOSE UR QL STRIP: NORMAL
KETONES UR QL STRIP: NORMAL
LEUKOCYTE ESTERASE URINE, POC: NORMAL
NITRITE, POC UA: NORMAL
PH, POC UA: 6
PROTEIN, POC: NORMAL
SPECIFIC GRAVITY, POC UA: 1.03
UROBILINOGEN, POC UA: NORMAL

## 2024-09-24 PROCEDURE — 99999 PR PBB SHADOW E&M-EST. PATIENT-LVL III: CPT | Mod: PBBFAC,,, | Performed by: ADVANCED PRACTICE MIDWIFE

## 2024-09-24 PROCEDURE — 59425 ANTEPARTUM CARE ONLY: CPT | Mod: S$PBB,TH,, | Performed by: ADVANCED PRACTICE MIDWIFE

## 2024-09-24 PROCEDURE — 81003 URINALYSIS AUTO W/O SCOPE: CPT | Mod: PBBFAC | Performed by: ADVANCED PRACTICE MIDWIFE

## 2024-09-24 PROCEDURE — 99999PBSHW POCT URINALYSIS W/O SCOPE: Mod: PBBFAC,,,

## 2024-09-24 PROCEDURE — 99213 OFFICE O/P EST LOW 20 MIN: CPT | Mod: PBBFAC | Performed by: ADVANCED PRACTICE MIDWIFE

## 2024-09-24 NOTE — PROGRESS NOTES
Return OB Visit    24 y.o. female  at 26w6d   She denies any complaints. FBS  2hr PP   Pt is in nursing school and studies at night.  States she is snacking on potato chips while studying.  Discussed alternative snacks that are protein based, low carb.  Reports good fetal movement or fluttering. Denies any vaginal bleeding, leakage of fluid, cramping, contractions, or pressure.   Total weight gain/weight loss in pregnancy: 8.618 kg (19 lb)     Vitals  BP: 120/80  Pulse: 77  Weight: 102.1 kg (225 lb)  Prenatal  Fundal Height (cm): 27 cm  Fetal Heart Rate: 140s  Movement: Present  Edema  LLE Edema: None  RLE Edema: None  Facial: None  Additional Edema?: No    Prenatal Labs:  Lab Results   Component Value Date    GROUPTRH AB POS 2024    HGB 11.7 (L) 09/15/2024    HCT 36.6 (L) 09/15/2024     09/15/2024    SICKLE Negative 2024    HEPBSAG Non-Reactive 2024    [id number] Non-Reactive 2024    LABNGO Negative 2024    LABURIN 20,000 Enterobacter aerogenes (A) 2024       A: 26w6d           ICD-10-CM ICD-9-CM    1. Gestational diabetes mellitus (GDM) in second trimester, gestational diabetes method of control unspecified  O24.419 648.83 US OB Follow-up Ea Gestation Transabd      2. Previous  delivery affecting pregnancy  O34.219 654.20 US OB Follow-up Ea Gestation Transabd      3. 26 weeks gestation of pregnancy  Z3A.26 V22.2 POCT URINALYSIS W/O SCOPE      4. History of herpes genitalis  Z86.19 V12.09       5. Urinary frequency  R35.0 788.41 POCT URINALYSIS W/O SCOPE          The following were addressed during this visit:    25-28 Weeks  -  Labor Signs   - Childbirth Education   - Maternity Leave paperwork   - Smoking Intervention   - Weight Gain/Diet/Exercise   - Rooming in baby during your hospital stay     -Benefits of breastfeeding   -Rooming In and Skin to Skin    P: Bleeding, daily fetal kick counts, and  labor/labor precautions  discussed.  Diet changes discussed  Will review BS logs at next visit, consider metformin    Questions answered to desired level of satisfaction  Verbalized understanding to all information and instructions provided.  Follow up in about 2 weeks (around 10/8/2024) for OBV, Ultrasound.    Rosa Gamboa CNM, WHNP-BC

## 2024-10-09 ENCOUNTER — HOSPITAL ENCOUNTER (OUTPATIENT)
Dept: RADIOLOGY | Facility: HOSPITAL | Age: 24
Discharge: HOME | End: 2024-10-09
Attending: ADVANCED PRACTICE MIDWIFE
Payer: MEDICAID

## 2024-10-09 ENCOUNTER — ROUTINE PRENATAL (OUTPATIENT)
Dept: OBSTETRICS AND GYNECOLOGY | Facility: CLINIC | Age: 24
End: 2024-10-09
Attending: ADVANCED PRACTICE MIDWIFE
Payer: MEDICAID

## 2024-10-09 VITALS
SYSTOLIC BLOOD PRESSURE: 118 MMHG | HEART RATE: 78 BPM | BODY MASS INDEX: 37.94 KG/M2 | WEIGHT: 228 LBS | DIASTOLIC BLOOD PRESSURE: 68 MMHG

## 2024-10-09 DIAGNOSIS — Z86.19 HISTORY OF HERPES GENITALIS: ICD-10-CM

## 2024-10-09 DIAGNOSIS — O24.419 GESTATIONAL DIABETES MELLITUS (GDM) IN THIRD TRIMESTER, GESTATIONAL DIABETES METHOD OF CONTROL UNSPECIFIED: Primary | ICD-10-CM

## 2024-10-09 DIAGNOSIS — O24.419 GESTATIONAL DIABETES MELLITUS (GDM) IN SECOND TRIMESTER, GESTATIONAL DIABETES METHOD OF CONTROL UNSPECIFIED: ICD-10-CM

## 2024-10-09 DIAGNOSIS — R35.0 URINARY FREQUENCY: ICD-10-CM

## 2024-10-09 DIAGNOSIS — O34.219 PREVIOUS CESAREAN DELIVERY AFFECTING PREGNANCY: ICD-10-CM

## 2024-10-09 DIAGNOSIS — Z3A.29 29 WEEKS GESTATION OF PREGNANCY: ICD-10-CM

## 2024-10-09 LAB
BILIRUB SERPL-MCNC: NORMAL MG/DL
BLOOD URINE, POC: NORMAL
CLARITY, UA: CLEAR
COLOR, UA: YELLOW
GLUCOSE UR QL STRIP: NORMAL
KETONES UR QL STRIP: NORMAL
LEUKOCYTE ESTERASE URINE, POC: NORMAL
NITRITE, POC UA: NORMAL
PH, POC UA: 5
PROTEIN, POC: NORMAL
SPECIFIC GRAVITY, POC UA: 1.02
UROBILINOGEN, POC UA: NORMAL

## 2024-10-09 PROCEDURE — 81003 URINALYSIS AUTO W/O SCOPE: CPT | Mod: PBBFAC | Performed by: ADVANCED PRACTICE MIDWIFE

## 2024-10-09 PROCEDURE — 59426 ANTEPARTUM CARE ONLY: CPT | Mod: S$PBB,TH,, | Performed by: ADVANCED PRACTICE MIDWIFE

## 2024-10-09 PROCEDURE — 99999 PR PBB SHADOW E&M-EST. PATIENT-LVL III: CPT | Mod: PBBFAC,,, | Performed by: ADVANCED PRACTICE MIDWIFE

## 2024-10-09 PROCEDURE — 99999PBSHW POCT URINALYSIS W/O SCOPE: Mod: PBBFAC,,,

## 2024-10-09 PROCEDURE — 76816 OB US FOLLOW-UP PER FETUS: CPT | Mod: TC

## 2024-10-09 PROCEDURE — 99213 OFFICE O/P EST LOW 20 MIN: CPT | Mod: PBBFAC,25 | Performed by: ADVANCED PRACTICE MIDWIFE

## 2024-10-09 PROCEDURE — 76816 OB US FOLLOW-UP PER FETUS: CPT | Mod: 26,,, | Performed by: RADIOLOGY

## 2024-10-09 NOTE — PROGRESS NOTES
Return OB Visit    24 y.o. female  at 29w0d   She denies complaints. Fasting -115, 2hr PP 90s.  Taking Metformin at 8am and 4pm daily.  Instructed to take 2nd dose at bedtime to see if this will normalize fasting BSs.  States she is eating a snack at bedtime and will eat chips a lot of the time.    Reports good fetal movement or fluttering. Denies any vaginal bleeding, leakage of fluid, cramping, contractions, or pressure.   Total weight gain/weight loss in pregnancy: 9.979 kg (22 lb)     Vitals  BP: 118/68  Pulse: 78  Weight: 103.4 kg (228 lb)  Prenatal  Fetal Heart Rate: 148  Movement: Present  Edema  LLE Edema: None  RLE Edema: None  Facial: None  Additional Edema?: No    Ultrasound today  EFW 3#0oz, ROBERT 19.38cm, vertex position, fundal placenta, CL 6.38cm    Prenatal Labs:  Lab Results   Component Value Date    GROUPTRH AB POS 2024    HGB 11.3 (L) 2024    HCT 35.1 (L) 2024     2024    SICKLE Negative 2024    HEPBSAG Non-Reactive 2024    [id number] Non-Reactive 2024    LABNGO Negative 2024    LABURIN 20,000 Enterobacter aerogenes (A) 2024       A: 29w0d           ICD-10-CM ICD-9-CM    1. Gestational diabetes mellitus (GDM) in third trimester, gestational diabetes method of control unspecified  O24.419 648.83       2. Previous  delivery affecting pregnancy  O34.219 654.20       3. 29 weeks gestation of pregnancy  Z3A.29 V22.2 POCT URINALYSIS W/O SCOPE      4. History of herpes genitalis  Z86.19 V12.09       5. Urinary frequency  R35.0 788.41 POCT URINALYSIS W/O SCOPE          The following were addressed during this visit:    29-32 Weeks  - Circumsision plans   - Fetal Kick Counts/PIH/PTL precautions   - Preeclampsia Education     -Benefits of breastfeeding   -Rooming In and Skin to Skin    P: Bleeding, daily fetal kick counts, and  labor/labor precautions discussed.    Questions answered to desired level of  satisfaction  Verbalized understanding to all information and instructions provided.  Follow up in about 2 weeks (around 10/23/2024) for OBV.    Rosa Gamboa CNM, WHLUMA-BC

## 2024-10-10 ENCOUNTER — PATIENT MESSAGE (OUTPATIENT)
Dept: OBSTETRICS AND GYNECOLOGY | Facility: CLINIC | Age: 24
End: 2024-10-10
Payer: MEDICAID

## 2024-10-15 ENCOUNTER — CLINICAL SUPPORT (OUTPATIENT)
Dept: FAMILY MEDICINE | Facility: CLINIC | Age: 24
End: 2024-10-15
Payer: MEDICAID

## 2024-10-15 DIAGNOSIS — Z23 NEED FOR INFLUENZA VACCINATION: Primary | ICD-10-CM

## 2024-10-15 PROCEDURE — 90471 IMMUNIZATION ADMIN: CPT | Mod: ,,,

## 2024-10-15 PROCEDURE — 90656 IIV3 VACC NO PRSV 0.5 ML IM: CPT | Mod: ,,,

## 2024-10-23 ENCOUNTER — ROUTINE PRENATAL (OUTPATIENT)
Dept: OBSTETRICS AND GYNECOLOGY | Facility: CLINIC | Age: 24
End: 2024-10-23
Payer: MEDICAID

## 2024-10-23 VITALS
BODY MASS INDEX: 38.44 KG/M2 | WEIGHT: 231 LBS | DIASTOLIC BLOOD PRESSURE: 78 MMHG | HEART RATE: 78 BPM | SYSTOLIC BLOOD PRESSURE: 120 MMHG

## 2024-10-23 DIAGNOSIS — O99.213 OTHER OBESITY AFFECTING PREGNANCY IN THIRD TRIMESTER: ICD-10-CM

## 2024-10-23 DIAGNOSIS — O34.219 PREVIOUS CESAREAN DELIVERY AFFECTING PREGNANCY: ICD-10-CM

## 2024-10-23 DIAGNOSIS — Z86.19 HISTORY OF HERPES GENITALIS: ICD-10-CM

## 2024-10-23 DIAGNOSIS — E66.89 OTHER OBESITY AFFECTING PREGNANCY IN THIRD TRIMESTER: ICD-10-CM

## 2024-10-23 DIAGNOSIS — O24.419 GESTATIONAL DIABETES MELLITUS (GDM) IN THIRD TRIMESTER, GESTATIONAL DIABETES METHOD OF CONTROL UNSPECIFIED: Primary | ICD-10-CM

## 2024-10-23 DIAGNOSIS — Z3A.31 31 WEEKS GESTATION OF PREGNANCY: ICD-10-CM

## 2024-10-23 DIAGNOSIS — R35.0 URINARY FREQUENCY: ICD-10-CM

## 2024-10-23 PROCEDURE — 99999 PR PBB SHADOW E&M-EST. PATIENT-LVL III: CPT | Mod: PBBFAC,,, | Performed by: ADVANCED PRACTICE MIDWIFE

## 2024-10-23 PROCEDURE — 99213 OFFICE O/P EST LOW 20 MIN: CPT | Mod: PBBFAC | Performed by: ADVANCED PRACTICE MIDWIFE

## 2024-10-23 PROCEDURE — 59426 ANTEPARTUM CARE ONLY: CPT | Mod: S$PBB,TH,, | Performed by: ADVANCED PRACTICE MIDWIFE

## 2024-10-23 NOTE — PROGRESS NOTES
Return OB Visit    24 y.o. female  at 31w0d   She denies complaints.  Fasting BS 80-90s, 2hr -160 (occasional).  Taking Metformin BID as discussed.  States she knows what she is eating that causes the higher 2hr PP BSs  Reports good fetal movement or fluttering. Denies any vaginal bleeding, leakage of fluid, cramping, contractions, or pressure.   Total weight gain/weight loss in pregnancy: 11.3 kg (25 lb)     Vitals  BP: 120/78  Pulse: 78  Weight: 104.8 kg (231 lb)  Prenatal  Fetal Heart Rate: 140s  Movement: Present  Edema  LLE Edema: None  RLE Edema: None  Facial: None  Additional Edema?: No    Prenatal Labs:  Lab Results   Component Value Date    GROUPTRH AB POS 2024    HGB 11.3 (L) 2024    HCT 35.1 (L) 2024     2024    SICKLE Negative 2024    HEPBSAG Non-Reactive 2024    [id number] Non-Reactive 2024    LABNGO Negative 2024    LABURIN 20,000 Enterobacter aerogenes (A) 2024       A: 31w0d           ICD-10-CM ICD-9-CM    1. Gestational diabetes mellitus (GDM) in third trimester, gestational diabetes method of control unspecified  O24.419 648.83 US OB/GYN Procedure ([facility]) - Extended List - Future      2. Previous  delivery affecting pregnancy  O34.219 654.20       3. 31 weeks gestation of pregnancy  Z3A.31 V22.2 POCT URINALYSIS W/O SCOPE      US OB/GYN Procedure ([facility]) - Extended List - Future      4. History of herpes genitalis  Z86.19 V12.09       5. Urinary frequency  R35.0 788.41 POCT URINALYSIS W/O SCOPE      6. Other obesity affecting pregnancy in third trimester  O99.213 649.13 US OB/GYN Procedure ([facility]) - Extended List - Future    E66.89 278.00           No pregnancy checklist tasks were completed during this visit, and no tasks are pending completion.    -Benefits of breastfeeding   -Rooming In and Skin to Skin    P: Bleeding, daily fetal kick counts, and  labor/labor precautions discussed.    Questions  answered to desired level of satisfaction  Verbalized understanding to all information and instructions provided.  Follow up in about 2 weeks (around 11/6/2024) for OBV, Ultrasound Growth (Gest Diabetes, Med controlled; Obesity).    Rosa Gamboa CNM, TERRENCE-BC

## 2024-11-06 ENCOUNTER — HOSPITAL ENCOUNTER (OUTPATIENT)
Dept: OBSTETRICS AND GYNECOLOGY | Facility: CLINIC | Age: 24
Discharge: HOME | End: 2024-11-06
Payer: MEDICAID

## 2024-11-06 ENCOUNTER — ROUTINE PRENATAL (OUTPATIENT)
Dept: OBSTETRICS AND GYNECOLOGY | Facility: CLINIC | Age: 24
End: 2024-11-06
Payer: MEDICAID

## 2024-11-06 VITALS
BODY MASS INDEX: 38.27 KG/M2 | DIASTOLIC BLOOD PRESSURE: 70 MMHG | SYSTOLIC BLOOD PRESSURE: 128 MMHG | WEIGHT: 230 LBS | HEART RATE: 80 BPM

## 2024-11-06 DIAGNOSIS — O24.419 GESTATIONAL DIABETES MELLITUS (GDM) IN THIRD TRIMESTER, GESTATIONAL DIABETES METHOD OF CONTROL UNSPECIFIED: ICD-10-CM

## 2024-11-06 DIAGNOSIS — O99.213 OTHER OBESITY AFFECTING PREGNANCY IN THIRD TRIMESTER: ICD-10-CM

## 2024-11-06 DIAGNOSIS — O34.219 PREVIOUS CESAREAN DELIVERY AFFECTING PREGNANCY: ICD-10-CM

## 2024-11-06 DIAGNOSIS — Z86.19 HISTORY OF HERPES GENITALIS: ICD-10-CM

## 2024-11-06 DIAGNOSIS — O24.419 GESTATIONAL DIABETES MELLITUS (GDM) IN THIRD TRIMESTER, GESTATIONAL DIABETES METHOD OF CONTROL UNSPECIFIED: Primary | ICD-10-CM

## 2024-11-06 DIAGNOSIS — Z3A.33 33 WEEKS GESTATION OF PREGNANCY: ICD-10-CM

## 2024-11-06 DIAGNOSIS — E66.89 OTHER OBESITY AFFECTING PREGNANCY IN THIRD TRIMESTER: ICD-10-CM

## 2024-11-06 DIAGNOSIS — Z3A.31 31 WEEKS GESTATION OF PREGNANCY: ICD-10-CM

## 2024-11-06 DIAGNOSIS — R35.0 URINARY FREQUENCY: ICD-10-CM

## 2024-11-06 LAB
BILIRUB SERPL-MCNC: NORMAL MG/DL
BLOOD URINE, POC: NORMAL
CLARITY, UA: CLEAR
COLOR, UA: NORMAL
GLUCOSE UR QL STRIP: NORMAL
KETONES UR QL STRIP: NORMAL
LEUKOCYTE ESTERASE URINE, POC: NORMAL
NITRITE, POC UA: NORMAL
PH, POC UA: 6
PROTEIN, POC: NORMAL
SPECIFIC GRAVITY, POC UA: 1.02
UROBILINOGEN, POC UA: NORMAL

## 2024-11-06 PROCEDURE — 76816 OB US FOLLOW-UP PER FETUS: CPT | Mod: 26,,, | Performed by: OBSTETRICS & GYNECOLOGY

## 2024-11-06 PROCEDURE — 99213 OFFICE O/P EST LOW 20 MIN: CPT | Mod: PBBFAC | Performed by: ADVANCED PRACTICE MIDWIFE

## 2024-11-06 PROCEDURE — 59426 ANTEPARTUM CARE ONLY: CPT | Mod: S$PBB,TH,, | Performed by: ADVANCED PRACTICE MIDWIFE

## 2024-11-06 PROCEDURE — 99999 PR PBB SHADOW E&M-EST. PATIENT-LVL III: CPT | Mod: PBBFAC,,, | Performed by: ADVANCED PRACTICE MIDWIFE

## 2024-11-06 PROCEDURE — 99999PBSHW POCT URINALYSIS W/O SCOPE: Mod: PBBFAC,,,

## 2024-11-06 PROCEDURE — 81003 URINALYSIS AUTO W/O SCOPE: CPT | Mod: PBBFAC | Performed by: ADVANCED PRACTICE MIDWIFE

## 2024-11-06 RX ORDER — VALACYCLOVIR HYDROCHLORIDE 500 MG/1
500 TABLET, FILM COATED ORAL DAILY
Qty: 30 TABLET | Refills: 11 | Status: SHIPPED | OUTPATIENT
Start: 2024-11-06 | End: 2025-11-06

## 2024-11-06 NOTE — PROGRESS NOTES
Return OB Visit    24 y.o. female  at 33w0d   She c/o jacquelin christensen. FBS 80-90 2hr -130  Reports good fetal movement or fluttering. Denies any vaginal bleeding, leakage of fluid, cramping,  or pressure.   Total weight gain/weight loss in pregnancy: 10.9 kg (24 lb)     Vitals  BP: 128/70  Pulse: 80  Weight: 104.3 kg (230 lb)  Prenatal  Fetal Heart Rate: 141  Movement: Present  Edema  LLE Edema: None  RLE Edema: None  Facial: None  Additional Edema?: No    Prenatal Labs:  Lab Results   Component Value Date    GROUPTRH AB POS 2024    HGB 11.3 (L) 2024    HCT 35.1 (L) 2024     2024    SICKLE Negative 2024    HEPBSAG Non-Reactive 2024    [id number] Non-Reactive 2024    LABNGO Negative 2024    LABURIN 20,000 Enterobacter aerogenes (A) 2024       A: 33w0d           ICD-10-CM ICD-9-CM    1. Gestational diabetes mellitus (GDM) in third trimester, gestational diabetes method of control unspecified  O24.419 648.83       2. Previous  delivery affecting pregnancy  O34.219 654.20       3. 33 weeks gestation of pregnancy  Z3A.33 V22.2 POCT URINALYSIS W/O SCOPE      4. History of herpes genitalis  Z86.19 V12.09 valACYclovir (VALTREX) 500 MG tablet      5. Urinary frequency  R35.0 788.41 POCT URINALYSIS W/O SCOPE      US today EFW: 4lb 10 oz ROBERT: 15.6cm Vertex     The following were addressed during this visit:    33-36 Weeks  - Childbirth Education/Hospital Tours   - Breastfeeding   - Fetal Kick Counts/PIH/PTL precautions     -Benefits of breastfeeding   -Rooming In and Skin to Skin    P: Bleeding, daily fetal kick counts, and  labor/labor precautions discussed.  Start Valtrex at 36wk    Questions answered to desired level of satisfaction  Verbalized understanding to all information and instructions provided.  Follow up in about 2 weeks (around 2024) for OBV.    Rosa Gamboa CNM, TERRENCE-BC

## 2024-11-21 ENCOUNTER — ROUTINE PRENATAL (OUTPATIENT)
Dept: OBSTETRICS AND GYNECOLOGY | Facility: CLINIC | Age: 24
End: 2024-11-21
Payer: MEDICAID

## 2024-11-21 VITALS
DIASTOLIC BLOOD PRESSURE: 75 MMHG | BODY MASS INDEX: 38.86 KG/M2 | SYSTOLIC BLOOD PRESSURE: 130 MMHG | WEIGHT: 233.5 LBS | HEART RATE: 98 BPM

## 2024-11-21 DIAGNOSIS — O24.419 GESTATIONAL DIABETES MELLITUS (GDM) IN THIRD TRIMESTER, GESTATIONAL DIABETES METHOD OF CONTROL UNSPECIFIED: Primary | ICD-10-CM

## 2024-11-21 DIAGNOSIS — O09.893 HISTORY OF MATERNAL CHLAMYDIA INFECTION, CURRENTLY PREGNANT IN THIRD TRIMESTER: ICD-10-CM

## 2024-11-21 DIAGNOSIS — R35.0 URINARY FREQUENCY: ICD-10-CM

## 2024-11-21 DIAGNOSIS — Z86.19 HISTORY OF HERPES GENITALIS: ICD-10-CM

## 2024-11-21 DIAGNOSIS — O34.219 PREVIOUS CESAREAN DELIVERY AFFECTING PREGNANCY: ICD-10-CM

## 2024-11-21 DIAGNOSIS — Z3A.35 35 WEEKS GESTATION OF PREGNANCY: ICD-10-CM

## 2024-11-21 LAB
CHLAMYDIA BY PCR: POSITIVE
N. GONORRHOEAE (GC) BY PCR: POSITIVE

## 2024-11-21 PROCEDURE — 87086 URINE CULTURE/COLONY COUNT: CPT | Mod: ,,, | Performed by: CLINICAL MEDICAL LABORATORY

## 2024-11-21 PROCEDURE — 87591 N.GONORRHOEAE DNA AMP PROB: CPT | Mod: ,,, | Performed by: CLINICAL MEDICAL LABORATORY

## 2024-11-21 PROCEDURE — 59426 ANTEPARTUM CARE ONLY: CPT | Mod: S$PBB,TH,, | Performed by: ADVANCED PRACTICE MIDWIFE

## 2024-11-21 PROCEDURE — 99213 OFFICE O/P EST LOW 20 MIN: CPT | Mod: PBBFAC | Performed by: ADVANCED PRACTICE MIDWIFE

## 2024-11-21 PROCEDURE — 99999 PR PBB SHADOW E&M-EST. PATIENT-LVL III: CPT | Mod: PBBFAC,,, | Performed by: ADVANCED PRACTICE MIDWIFE

## 2024-11-21 PROCEDURE — 87653 STREP B DNA AMP PROBE: CPT | Mod: ,,, | Performed by: CLINICAL MEDICAL LABORATORY

## 2024-11-21 PROCEDURE — 87491 CHLMYD TRACH DNA AMP PROBE: CPT | Mod: ,,, | Performed by: CLINICAL MEDICAL LABORATORY

## 2024-11-21 NOTE — PROGRESS NOTES
Return OB Visit    24 y.o. female  at 35w1d   She c/o cramping and pelvic pressure that has been constant this morning.  Reports vaginal discharge that began after having intercourse with her partner.  Is concerned about possible chlamydia infection.  FBS:90's 2hr pp 80's per pt report, no logs to review.  C/O nausea and poor appetite.  Recommend Pepcid BID on empty stomach.   GBS collected today  Urine sent for culture and GC/CT  Reports good fetal movement or fluttering. Denies any vaginal bleeding, leakage of fluid, cramping, contractions, or pressure.   Total weight gain/weight loss in pregnancy: 12.5 kg (27 lb 8 oz)     Vitals  BP: 130/75  Pulse: 98  Weight: 105.9 kg (233 lb 8 oz)  Prenatal  Fundal Height (cm): 35 cm  Fetal Heart Rate: 140's  Movement: Present  Edema  LLE Edema: None  RLE Edema: None  Facial: None  Additional Edema?: No  Cervical Exam  Dilation: 1  Effacement (%): 0  Station: Ballotable  Presentation: Vertex  Station (Labor Curve): 9 cm  Dilation/Effacement/Station  Dilation: 1  Effacement (%): 0  Station: Ballotable    Prenatal Labs:  Lab Results   Component Value Date    GROUPTRH AB POS 2024    HGB 11.3 (L) 2024    HCT 35.1 (L) 2024     2024    SICKLE Negative 2024    HEPBSAG Non-Reactive 2024    [id number] Non-Reactive 2024    LABNGO Negative 2024    LABURIN 20,000 Enterobacter aerogenes (A) 2024       A: 35w1d           ICD-10-CM ICD-9-CM    1. Gestational diabetes mellitus (GDM) in third trimester, gestational diabetes method of control unspecified  O24.419 648.83       2. Previous  delivery affecting pregnancy  O34.219 654.20 Strep B Screen, Antepartum      3. 35 weeks gestation of pregnancy  Z3A.35 V22.2 Strep B Screen, Antepartum      CANCELED: POCT URINALYSIS W/O SCOPE      4. History of herpes genitalis  Z86.19 V12.09       5. Urinary frequency  R35.0 788.41 Urine culture      CANCELED: POCT URINALYSIS W/O  SCOPE      6. History of maternal Chlamydia infection, currently pregnant in third trimester  O09.893 V23.49 Chlamydia/GC, PCR          No pregnancy checklist tasks were completed during this visit, and no tasks are pending completion.    -Benefits of breastfeeding   -Rooming In and Skin to Skin    P: Bleeding, daily fetal kick counts, and  labor/labor precautions discussed.  Start Valtrex at 36wk    Questions answered to desired level of satisfaction  Verbalized understanding to all information and instructions provided.  Follow up in about 1 week (around 2024) for OBV.    Rosa Gamboa CNM, TERRENCE-BC

## 2024-11-22 LAB — GROUP B STREP, PCR: POSITIVE

## 2024-11-23 LAB — UA COMPLETE W REFLEX CULTURE PNL UR: NORMAL

## 2024-11-25 ENCOUNTER — TELEPHONE (OUTPATIENT)
Dept: OBSTETRICS AND GYNECOLOGY | Facility: CLINIC | Age: 24
End: 2024-11-25
Payer: MEDICAID

## 2024-11-25 ENCOUNTER — HOSPITAL ENCOUNTER (EMERGENCY)
Facility: HOSPITAL | Age: 24
Discharge: HOME | End: 2024-11-25
Attending: OBSTETRICS & GYNECOLOGY
Payer: MEDICAID

## 2024-11-25 VITALS
BODY MASS INDEX: 40.18 KG/M2 | DIASTOLIC BLOOD PRESSURE: 62 MMHG | HEIGHT: 66 IN | WEIGHT: 250 LBS | RESPIRATION RATE: 20 BRPM | HEART RATE: 101 BPM | SYSTOLIC BLOOD PRESSURE: 127 MMHG | TEMPERATURE: 99 F

## 2024-11-25 DIAGNOSIS — O24.415 GESTATIONAL DIABETES MELLITUS (GDM) IN THIRD TRIMESTER CONTROLLED ON ORAL HYPOGLYCEMIC DRUG: Primary | ICD-10-CM

## 2024-11-25 DIAGNOSIS — O13.3 GESTATIONAL HYPERTENSION W/O SIGNIFICANT PROTEINURIA IN 3RD TRIMESTER: ICD-10-CM

## 2024-11-25 DIAGNOSIS — Z3A.35 35 WEEKS GESTATION OF PREGNANCY: ICD-10-CM

## 2024-11-25 DIAGNOSIS — O98.213 GONORRHEA AFFECTING PREGNANCY IN THIRD TRIMESTER: ICD-10-CM

## 2024-11-25 LAB
ALBUMIN SERPL BCP-MCNC: 2.6 G/DL (ref 3.5–5)
ALBUMIN/GLOB SERPL: 0.6 {RATIO}
ALP SERPL-CCNC: 86 U/L (ref 40–150)
ALT SERPL W P-5'-P-CCNC: 8 U/L
ANION GAP SERPL CALCULATED.3IONS-SCNC: 11 MMOL/L (ref 7–16)
AST SERPL W P-5'-P-CCNC: 12 U/L (ref 5–34)
BACTERIA #/AREA URNS HPF: ABNORMAL /HPF
BASOPHILS # BLD AUTO: 0.02 K/UL (ref 0–0.2)
BASOPHILS NFR BLD AUTO: 0.2 % (ref 0–1)
BILIRUB SERPL-MCNC: 0.4 MG/DL
BILIRUB UR QL STRIP: NEGATIVE
BUN SERPL-MCNC: 5 MG/DL (ref 7–19)
BUN/CREAT SERPL: 8 (ref 6–20)
CALCIUM SERPL-MCNC: 8.7 MG/DL (ref 8.4–10.2)
CHLORIDE SERPL-SCNC: 108 MMOL/L (ref 98–107)
CLARITY UR: ABNORMAL
CO2 SERPL-SCNC: 19 MMOL/L (ref 22–29)
COLOR UR: YELLOW
CREAT SERPL-MCNC: 0.66 MG/DL (ref 0.55–1.02)
CREAT UR-MCNC: 216 MG/DL (ref 15–325)
DIFFERENTIAL METHOD BLD: ABNORMAL
EGFR (NO RACE VARIABLE) (RUSH/TITUS): 124 ML/MIN/1.73M2
EOSINOPHIL # BLD AUTO: 0.02 K/UL (ref 0–0.5)
EOSINOPHIL NFR BLD AUTO: 0.2 % (ref 1–4)
ERYTHROCYTE [DISTWIDTH] IN BLOOD BY AUTOMATED COUNT: 13.3 % (ref 11.5–14.5)
GLOBULIN SER-MCNC: 4.3 G/DL (ref 2–4)
GLUCOSE SERPL-MCNC: 101 MG/DL (ref 70–105)
GLUCOSE SERPL-MCNC: 102 MG/DL (ref 74–100)
GLUCOSE UR STRIP-MCNC: NORMAL MG/DL
HCT VFR BLD AUTO: 32.9 % (ref 38–47)
HGB BLD-MCNC: 10.1 G/DL (ref 12–16)
IMM GRANULOCYTES # BLD AUTO: 0.04 K/UL (ref 0–0.04)
IMM GRANULOCYTES NFR BLD: 0.5 % (ref 0–0.4)
KETONES UR STRIP-SCNC: 20 MG/DL
LDH SERPL-CCNC: 82 U/L (ref 125–220)
LEUKOCYTE ESTERASE UR QL STRIP: ABNORMAL
LYMPHOCYTES # BLD AUTO: 1.45 K/UL (ref 1–4.8)
LYMPHOCYTES NFR BLD AUTO: 17 % (ref 27–41)
MCH RBC QN AUTO: 24 PG (ref 27–31)
MCHC RBC AUTO-ENTMCNC: 30.7 G/DL (ref 32–36)
MCV RBC AUTO: 78.3 FL (ref 80–96)
MONOCYTES # BLD AUTO: 0.58 K/UL (ref 0–0.8)
MONOCYTES NFR BLD AUTO: 6.8 % (ref 2–6)
MPC BLD CALC-MCNC: 11 FL (ref 9.4–12.4)
MUCOUS, UA: ABNORMAL /LPF
NEUTROPHILS # BLD AUTO: 6.44 K/UL (ref 1.8–7.7)
NEUTROPHILS NFR BLD AUTO: 75.3 % (ref 53–65)
NITRITE UR QL STRIP: NEGATIVE
NRBC # BLD AUTO: 0 X10E3/UL
NRBC, AUTO (.00): 0 %
PH UR STRIP: 6 PH UNITS
PLATELET # BLD AUTO: 213 K/UL (ref 150–400)
POTASSIUM SERPL-SCNC: 3.6 MMOL/L (ref 3.5–5.1)
PROT SERPL-MCNC: 6.9 G/DL (ref 6.4–8.3)
PROT UR QL STRIP: 30
PROT UR-MCNC: 26.4 MG/DL
PROT/CREAT 24H UR-RTO: 0.12 (ref 0–0.2)
RBC # BLD AUTO: 4.2 M/UL (ref 4.2–5.4)
RBC # UR STRIP: NEGATIVE /UL
RBC #/AREA URNS HPF: 15 /HPF
SODIUM SERPL-SCNC: 134 MMOL/L (ref 136–145)
SP GR UR STRIP: 1.02
SQUAMOUS #/AREA URNS LPF: ABNORMAL /HPF
URATE SERPL-MCNC: 5.8 MG/DL (ref 2.6–6)
UROBILINOGEN UR STRIP-ACNC: NORMAL MG/DL
WBC # BLD AUTO: 8.55 K/UL (ref 4.5–11)
WBC #/AREA URNS HPF: 5 /HPF

## 2024-11-25 PROCEDURE — 81003 URINALYSIS AUTO W/O SCOPE: CPT | Performed by: OBSTETRICS & GYNECOLOGY

## 2024-11-25 PROCEDURE — 83615 LACTATE (LD) (LDH) ENZYME: CPT | Performed by: OBSTETRICS & GYNECOLOGY

## 2024-11-25 PROCEDURE — 82962 GLUCOSE BLOOD TEST: CPT

## 2024-11-25 PROCEDURE — 84550 ASSAY OF BLOOD/URIC ACID: CPT | Performed by: OBSTETRICS & GYNECOLOGY

## 2024-11-25 PROCEDURE — 80053 COMPREHEN METABOLIC PANEL: CPT | Performed by: OBSTETRICS & GYNECOLOGY

## 2024-11-25 PROCEDURE — 36415 COLL VENOUS BLD VENIPUNCTURE: CPT | Performed by: OBSTETRICS & GYNECOLOGY

## 2024-11-25 PROCEDURE — 96372 THER/PROPH/DIAG INJ SC/IM: CPT | Performed by: OBSTETRICS & GYNECOLOGY

## 2024-11-25 PROCEDURE — 63600175 PHARM REV CODE 636 W HCPCS: Performed by: OBSTETRICS & GYNECOLOGY

## 2024-11-25 PROCEDURE — 99284 EMERGENCY DEPT VISIT MOD MDM: CPT | Mod: 25

## 2024-11-25 PROCEDURE — 84156 ASSAY OF PROTEIN URINE: CPT | Performed by: OBSTETRICS & GYNECOLOGY

## 2024-11-25 PROCEDURE — 85025 COMPLETE CBC W/AUTO DIFF WBC: CPT | Performed by: OBSTETRICS & GYNECOLOGY

## 2024-11-25 RX ORDER — AZITHROMYCIN 250 MG/1
TABLET, FILM COATED ORAL
Qty: 6 TABLET | Refills: 0 | Status: SHIPPED | OUTPATIENT
Start: 2024-11-25 | End: 2024-11-30

## 2024-11-25 RX ADMIN — LIDOCAINE HYDROCHLORIDE 1 G: 10 INJECTION, SOLUTION INFILTRATION; PERINEURAL at 03:11

## 2024-11-25 NOTE — ED NOTES
Refer to AUDREY with complaints of dizziness and weakness. Stable condition. Connected to EFM. Plan of care discussed with positive feedback. Oriented to room.

## 2024-11-25 NOTE — ED NOTES
Discharge instructions reviewed and copy given. Instructed on fetal kick counts and s/s of  labor. Voiced understanding. Discharge per ambulatory in stable condition.

## 2024-11-25 NOTE — TELEPHONE ENCOUNTER
Patient notified of lab results and to  medication from pharmacy and that she will get injection at next visit. No sex with partner until he has been treated for 2 weeks. Patient voices understanding.

## 2024-11-25 NOTE — ED NOTES
Dr. Liz notified of status. Discharged with discharge instructions given. To keep next scheduled appointment. Questions answered.

## 2024-11-25 NOTE — ED PROVIDER NOTES
Encounter Date: 2024    AUDREY Physician: Jon Liz   Primary OBGYN:Rosa Gamboa CNM    Admit Diagnosis/Chief Complaint:  Near syncopal episode  History     Chief Complaint   Patient presents with    Weakness    Dizziness     Patient is a 24-year-old  3 para 2 who presents at 35 weeks and 5 days following a near syncopal episode that occurred at [facility].  Patient has a history of gestational diabetes which is well controlled by metformin twice a day.  She states that she did not take her metformin this morning but did eat a a doughnut for breakfast.  Proximally half an hour to 45 minutes later while at [facility] she experienced the presyncopal episode during which she broke out in a sweat noises became muffled and she felt nauseated.  She denies actually passing out striking the floor or head.  She reports active fetal movement and denies any uterine contractions.  Patient reports that she recently was diagnosed with positive GC and positive chlamydia and received 1 g of azithromycin at the urgent clinic over the weekend.  She was scheduled to receive Rocephin at her next office appointment this week.        Obstetric HPI:  Patient reports None contractions, active fetal movement, absent vaginal bleeding , absent loss of fluid      Objective:     Vital Signs (Most Recent):  Temp: 98.8 °F (37.1 °C) (24 1133)  Pulse: 101 (24 1133)  Resp: 20 (24 1133)  BP: 127/62 (24 1133) Vital Signs (24h Range):  Temp:  [98.8 °F (37.1 °C)] 98.8 °F (37.1 °C)  Pulse:  [101] 101  Resp:  [20] 20  BP: (127)/(62) 127/62     Weight: 113.4 kg (250 lb)  Body mass index is 40.35 kg/m².    FHT: 140  Cat 1 (reassuring)  TOCO:  No contractions noted  No intake or output data in the 24 hours ending 24 1645    Cervical Exam:  Not indicated       Significant Labs:  Recent Lab Results         24  1244   24  1235   24  1208        Albumin/Globulin Ratio     0.6       Albumin     2.6        ALP     86       ALT     8       Anion Gap     11       Appearance, UA   Turbid         AST     12       Bacteria, UA   Few         Baso #     0.02       Basophil %     0.2       Bilirubin (UA)   Negative         BILIRUBIN TOTAL     0.4       BUN     5       BUN/CREAT RATIO     8       Calcium     8.7       Chloride     108       CO2     19       Color, UA   Yellow         Creatinine     0.66       Urine Creatinine   216         Differential Method     Auto       eGFR     124       Eos #     0.02       Eos %     0.2       Globulin, Total     4.3       Glucose     102       Glucose, UA   Normal         Hematocrit     32.9       Hemoglobin     10.1       Immature Grans (Abs)     0.04       Immature Granulocytes     0.5       Ketones, UA   20         Lactate Dehydrogenase     82       Leukocyte Esterase, UA   Trace         Lymph #     1.45       Lymph %     17.0       MCH     24.0       MCHC     30.7       MCV     78.3       Mono #     0.58       Mono %     6.8       MPV     11.0       Mucous   Many         Neutrophils, Abs     6.44       Neutrophils Relative     75.3       NITRITE UA   Negative         nRBC     0.0       NUCLEATED RBC ABSOLUTE     0.00       Blood, UA   Negative         pH, UA   6.0         Platelet Count     213       POC Glucose 101           Potassium     3.6       PROTEIN TOTAL     6.9       Protein, UA   30         Urine Protein   26.4         Protein/Creat Ratio   0.122         RBC     4.20       RBC, UA   15         RDW     13.3       Sodium     134       Spec Grav UA   1.024         Squamous Epithelial Cells, UA   Occasional         Uric Acid     5.8       UROBILINOGEN UA   Normal         WBC, UA   5         WBC     8.55             Review of patient's allergies indicates:  No Known Allergies  Past Medical History:   Diagnosis Date    Bacterial vaginosis 9/19/2022    Elevated blood sugar 7/26/2022    Encounter for cervical Pap smear with pelvic exam 8/15/2022    Encounter for initial  prescription of vaginal ring hormonal contraceptive 8/15/2022    Gestational diabetes mellitus (GDM) in third trimester 11/15/2021    Gestational diabetes mellitus (GDM) in third trimester 11/15/2021    Gestational hypertension, third trimester 11/15/2021    Herpes simplex virus (HSV) infection     High risk medication use 2022    S/P  section 2022    Vaginal discharge 2022     Past Surgical History:   Procedure Laterality Date     SECTION N/A 2022    Procedure:  SECTION;  Surgeon: Michelle Cuadra MD;  Location: [facility] L&D;  Service: OB/GYN;  Laterality: N/A;     Family History   Problem Relation Name Age of Onset    Hypertension Father      Diabetes Mother      Hypertension Mother      No Known Problems Brother      Hypertension Brother      No Known Problems Daughter      Breast cancer Neg Hx      Colon cancer Neg Hx      Ovarian cancer Neg Hx       Social History     Tobacco Use    Smoking status: Never     Passive exposure: Never    Smokeless tobacco: Never   Substance Use Topics    Alcohol use: Not Currently    Drug use: Not Currently     Types: Marijuana     Comment: last time she used was about 2-3 weeks ago     Review of Systems   Constitutional:  Negative for appetite change, chills, fatigue and fever.   HENT:  Negative for congestion.    Eyes:  Negative for visual disturbance.   Respiratory:  Negative for cough, chest tightness and shortness of breath.    Cardiovascular:  Negative for chest pain, palpitations and leg swelling.   Gastrointestinal:  Negative for abdominal distention, abdominal pain, blood in stool, constipation, diarrhea, nausea and vomiting.   Endocrine: Negative for cold intolerance, heat intolerance, polydipsia, polyphagia and polyuria.   Genitourinary:  Negative for difficulty urinating, dyspareunia, dysuria, flank pain, frequency, pelvic pain, urgency, vaginal bleeding, vaginal discharge and vaginal pain.   Musculoskeletal: Negative.   Negative for back pain, myalgias and neck pain.   Skin: Negative.    Neurological:  Positive for syncope and headaches. Negative for dizziness, light-headedness and numbness.   Psychiatric/Behavioral: Negative.  Negative for agitation, behavioral problems and confusion.        Physical Exam     Initial Vitals [11/25/24 1133]   BP Pulse Resp Temp SpO2   127/62 101 20 98.8 °F (37.1 °C) --      MAP       --         Physical Exam    Nursing note and vitals reviewed.  Constitutional: She appears well-developed and well-nourished. No distress.   HENT:   Head: Normocephalic and atraumatic.   Nose: Nose normal.   Eyes: EOM are normal. Pupils are equal, round, and reactive to light.   Neck:   Normal range of motion.  Cardiovascular:  Normal rate.           Pulmonary/Chest: No respiratory distress.   Abdominal: Abdomen is soft. There is no abdominal tenderness.   Musculoskeletal:         General: No edema.      Cervical back: Normal range of motion.     Neurological: She is alert and oriented to person, place, and time.   Skin: Skin is warm and dry.   Psychiatric: She has a normal mood and affect. Thought content normal.         ED Course     Labs Reviewed   COMPREHENSIVE METABOLIC PANEL - Abnormal       Result Value    Sodium 134 (*)     Potassium 3.6      Chloride 108 (*)     CO2 19 (*)     Anion Gap 11      Glucose 102 (*)     BUN 5 (*)     Creatinine 0.66      BUN/Creatinine Ratio 8      Calcium 8.7      Total Protein 6.9      Albumin 2.6 (*)     Globulin 4.3 (*)     A/G Ratio 0.6      Bilirubin, Total 0.4      Alk Phos 86      ALT 8      AST 12      eGFR 124     LACTATE DEHYDROGENASE - Abnormal    LDH 82 (*)    PROTEIN / CREATININE RATIO, URINE - Abnormal    Creatinine, Urine 216      Protein, Urine 26.4 (*)     Protein/Creatinine Ratio 0.122     URINALYSIS - Abnormal    Color, UA Yellow      Clarity, UA Turbid      pH, UA 6.0      Leukocytes, UA Trace (*)     Nitrites, UA Negative      Protein, UA 30 (*)     Glucose, UA  Normal      Ketones, UA 20 (*)     Urobilinogen, UA Normal      Bilirubin, UA Negative      Blood, UA Negative      Specific Gravity, UA 1.024     CBC WITH DIFFERENTIAL - Abnormal    WBC 8.55      RBC 4.20      Hemoglobin 10.1 (*)     Hematocrit 32.9 (*)     MCV 78.3 (*)     MCH 24.0 (*)     MCHC 30.7 (*)     RDW 13.3      Platelet Count 213      MPV 11.0      Neutrophils % 75.3 (*)     Lymphocytes % 17.0 (*)     Monocytes % 6.8 (*)     Eosinophils % 0.2 (*)     Basophils % 0.2      Immature Granulocytes % 0.5 (*)     nRBC, Auto 0.0      Neutrophils, Abs 6.44      Lymphocytes, Absolute 1.45      Monocytes, Absolute 0.58      Eosinophils, Absolute 0.02      Basophils, Absolute 0.02      Immature Granulocytes, Absolute 0.04      nRBC, Absolute 0.00      Diff Type Auto     URINALYSIS, MICROSCOPIC - Abnormal    WBC, UA 5      RBC, UA 15 (*)     Bacteria, UA Few (*)     Squamous Epithelial Cells, UA Occasional (*)     Mucous Many (*)    URIC ACID - Normal    Uric Acid 5.8     CBC W/ AUTO DIFFERENTIAL    Narrative:     The following orders were created for panel order CBC auto differential.  Procedure                               Abnormality         Status                     ---------                               -----------         ------                     CBC with Differential[[id number]]       Abnormal            Final result                 Please view results for these tests on the individual orders.   POCT GLUCOSE MONITORING CONTINUOUS    POC Glucose 101     POCT GLUCOSE MONITORING CONTINUOUS        Imaging Results    None          Medical Decision Making     Medications   cefTRIAXone (Rocephin) 1 g in LIDOcaine HCL 10 mg/ml (1%) 4 mL IM only syringe (1 g Intramuscular Given 24 1545)                 ED Course as of 24 1645      4184 24-year-old  presents at 35 weeks and 5 days falling on a near syncopal episode at [facility].  Patient reports that she has a history of gestational  diabetes and she is taking med for min twice a day.  She missed her morning dose and ate a donut for breakfast.  She experienced nausea sweating and muffling of sounds.  She has a reactive nonstress test with an occasional contraction noted.  Her fingerstick glucose upon arrival was 101 urinalysis showed normal glucose ketones 20 30 of protein and negative bilirubin blood nitrites with a trace of leukocyte esterase.  Her lactate dehydrogenase was 82 WBC 8.55 H&H 10.1 and 32.9 platelet count 213 ALT 8 AST 12 BUN 5 creatinine 0.66 sodium 134 potassium 3.6 chloride 108 CO2 19.  Uric acid was 5.8.  Protein creatinine ratio is 0.122  She has a reactive nonstress test with no uterine contractions noted.  Patient received 1 g of Rocephin I am for positive gonorrhea.  She will follow-up with Rosa Gamboa CNM on Wednesday 11/27/2024.. [JA]      ED Course User Index  [JA] Jon Liz MD                 Clinical Impression:   Final diagnoses:  [O13.3] Gestational hypertension w/o significant proteinuria in 3rd trimester  [O24.415] Gestational diabetes mellitus (GDM) in third trimester controlled on oral hypoglycemic drug (Primary)  [Z3A.35] 35 weeks gestation of pregnancy  [O98.213] Gonorrhea affecting pregnancy in third trimester        ED Disposition Condition    Discharge Stable          ED Prescriptions    None       Follow-up Information       Follow up With Specialties Details Why Contact Info    Rosa Gamboa CNM Obstetrics and Gynecology In 2 days  [address] [facility] [address]  [phone]               Jon Liz MD  11/25/24 7713

## 2024-11-25 NOTE — TELEPHONE ENCOUNTER
----- Message from Rosa Gamboa sent at 11/25/2024  8:53 AM CST -----  Call patient and inform she is positive for Gonorrhea and Chlamydia.  I have sent in a prescription for Zithromax to her pharmacy.  Take as directed.  She will need a rocephin injection at her visit this week.  No sex with her partner until 2 weeks after he has been treated.

## 2024-11-26 ENCOUNTER — ROUTINE PRENATAL (OUTPATIENT)
Dept: OBSTETRICS AND GYNECOLOGY | Facility: CLINIC | Age: 24
End: 2024-11-26
Payer: MEDICAID

## 2024-11-26 VITALS
WEIGHT: 238 LBS | SYSTOLIC BLOOD PRESSURE: 122 MMHG | DIASTOLIC BLOOD PRESSURE: 68 MMHG | HEART RATE: 78 BPM | BODY MASS INDEX: 38.41 KG/M2

## 2024-11-26 DIAGNOSIS — Z3A.35 35 WEEKS GESTATION OF PREGNANCY: ICD-10-CM

## 2024-11-26 DIAGNOSIS — Z86.19 HISTORY OF HERPES GENITALIS: ICD-10-CM

## 2024-11-26 DIAGNOSIS — R35.0 URINARY FREQUENCY: ICD-10-CM

## 2024-11-26 DIAGNOSIS — O99.013 ANEMIA DURING PREGNANCY IN THIRD TRIMESTER: ICD-10-CM

## 2024-11-26 DIAGNOSIS — O24.419 GESTATIONAL DIABETES MELLITUS (GDM) IN THIRD TRIMESTER, GESTATIONAL DIABETES METHOD OF CONTROL UNSPECIFIED: Primary | ICD-10-CM

## 2024-11-26 DIAGNOSIS — O34.219 PREVIOUS CESAREAN DELIVERY AFFECTING PREGNANCY: ICD-10-CM

## 2024-11-26 PROCEDURE — 99213 OFFICE O/P EST LOW 20 MIN: CPT | Mod: PBBFAC | Performed by: ADVANCED PRACTICE MIDWIFE

## 2024-11-26 PROCEDURE — 59426 ANTEPARTUM CARE ONLY: CPT | Mod: S$PBB,TH,, | Performed by: ADVANCED PRACTICE MIDWIFE

## 2024-11-26 PROCEDURE — 99999 PR PBB SHADOW E&M-EST. PATIENT-LVL III: CPT | Mod: PBBFAC,,, | Performed by: ADVANCED PRACTICE MIDWIFE

## 2024-11-26 RX ORDER — FERROUS SULFATE 325(65) MG
325 TABLET, DELAYED RELEASE (ENTERIC COATED) ORAL 2 TIMES DAILY
Qty: 60 TABLET | Refills: 11 | Status: SHIPPED | OUTPATIENT
Start: 2024-11-26

## 2024-11-26 NOTE — PROGRESS NOTES
Return OB Visit    24 y.o. female  at 35w6d   She c/o irregular contractions.  Denies h'ache or visual changes.  Came to L&D yesterday after having a syncopal episode while shopping.  Labs done.  Hgb 10.1, rx sent for Ferrous sulfate BID today.  Pt states she has been craving ice.  Reports blood sugars are good, does not have logs with her today.   Reports good fetal movement or fluttering. Denies any vaginal bleeding, leakage of fluid.   Total weight gain/weight loss in pregnancy: 14.5 kg (32 lb)     Vitals  BP: 122/68  Pulse: 78  Weight: 108 kg (238 lb)  Prenatal  Fetal Heart Rate: 140s  Movement: Present  Edema  LLE Edema: None  RLE Edema: None  Facial: None  Additional Edema?: No  Cervical Exam  Dilation: 1 (ext os 2cm)  Effacement (%): 0  Station: -3  Presentation: Vertex  Station (Labor Curve): 8 cm  Dilation/Effacement/Station  Dilation: 1 (ext os 2cm)  Effacement (%): 0  Station: -3    Prenatal Labs:  Lab Results   Component Value Date    GROUPTRH AB POS 2024    HGB 10.1 (L) 2024    HCT 32.9 (L) 2024     2024    SICKLE Negative 2024    HEPBSAG Non-Reactive 2024    [id number] Non-Reactive 2024    LABNGO Positive (A) 2024    LABURIN Skin/Urogenital Roxanne Isolated, no further workup. 2024       A: 35w6d           ICD-10-CM ICD-9-CM    1. Gestational diabetes mellitus (GDM) in third trimester, gestational diabetes method of control unspecified  O24.419 648.83 US OB/GYN Procedure ([facility]) - Extended List - Future      2. Previous  delivery affecting pregnancy  O34.219 654.20 US OB/GYN Procedure ([facility]) - Extended List - Future      3. 35 weeks gestation of pregnancy  Z3A.35 V22.2 POCT URINALYSIS W/O SCOPE      4. History of herpes genitalis  Z86.19 V12.09       5. Urinary frequency  R35.0 788.41 POCT URINALYSIS W/O SCOPE      6. Anemia during pregnancy in third trimester  O99.013 648.23 ferrous sulfate 325 (65 FE) MG EC tablet       US OB/GYN Procedure ([facility]) - Extended List - Future          No pregnancy checklist tasks were completed during this visit, and no tasks are pending completion.    -Benefits of breastfeeding   -Rooming In and Skin to Skin    P: Bleeding, daily fetal kick counts, and  labor/labor precautions discussed.  Valtrex daily    Questions answered to desired level of satisfaction  Verbalized understanding to all information and instructions provided.  Follow up in about 1 week (around 12/3/2024) for OBV, Ultrasound (GDM, growth).    Rosa Gamboa CNM, WHLUMA-BC

## 2024-12-03 ENCOUNTER — ROUTINE PRENATAL (OUTPATIENT)
Dept: OBSTETRICS AND GYNECOLOGY | Facility: CLINIC | Age: 24
End: 2024-12-03
Payer: MEDICAID

## 2024-12-03 ENCOUNTER — HOSPITAL ENCOUNTER (OUTPATIENT)
Dept: OBSTETRICS AND GYNECOLOGY | Facility: CLINIC | Age: 24
Discharge: HOME | End: 2024-12-03
Payer: MEDICAID

## 2024-12-03 VITALS
BODY MASS INDEX: 37.77 KG/M2 | SYSTOLIC BLOOD PRESSURE: 118 MMHG | HEART RATE: 85 BPM | WEIGHT: 234 LBS | DIASTOLIC BLOOD PRESSURE: 78 MMHG

## 2024-12-03 DIAGNOSIS — Z3A.36 36 WEEKS GESTATION OF PREGNANCY: ICD-10-CM

## 2024-12-03 DIAGNOSIS — O24.419 GESTATIONAL DIABETES MELLITUS (GDM) IN THIRD TRIMESTER, GESTATIONAL DIABETES METHOD OF CONTROL UNSPECIFIED: Primary | ICD-10-CM

## 2024-12-03 DIAGNOSIS — R35.0 URINARY FREQUENCY: ICD-10-CM

## 2024-12-03 DIAGNOSIS — Z86.19 HISTORY OF HERPES GENITALIS: ICD-10-CM

## 2024-12-03 DIAGNOSIS — O34.219 PREVIOUS CESAREAN DELIVERY AFFECTING PREGNANCY: ICD-10-CM

## 2024-12-03 DIAGNOSIS — O99.013 ANEMIA DURING PREGNANCY IN THIRD TRIMESTER: ICD-10-CM

## 2024-12-03 DIAGNOSIS — O24.419 GESTATIONAL DIABETES MELLITUS (GDM) IN THIRD TRIMESTER, GESTATIONAL DIABETES METHOD OF CONTROL UNSPECIFIED: ICD-10-CM

## 2024-12-03 PROCEDURE — 99999 PR PBB SHADOW E&M-EST. PATIENT-LVL III: CPT | Mod: PBBFAC,,, | Performed by: ADVANCED PRACTICE MIDWIFE

## 2024-12-03 PROCEDURE — 76816 OB US FOLLOW-UP PER FETUS: CPT | Mod: 26,,, | Performed by: OBSTETRICS & GYNECOLOGY

## 2024-12-03 PROCEDURE — 59426 ANTEPARTUM CARE ONLY: CPT | Mod: S$PBB,TH,, | Performed by: ADVANCED PRACTICE MIDWIFE

## 2024-12-03 PROCEDURE — 99213 OFFICE O/P EST LOW 20 MIN: CPT | Mod: PBBFAC | Performed by: ADVANCED PRACTICE MIDWIFE

## 2024-12-03 NOTE — PROGRESS NOTES
Return OB Visit    24 y.o. female  at 36w6d   She c/o pressure, cramping and occasional jacquelin christensen. Pt requesting TOLAC.  Previous vaginal delivery x 1 with 1st pregnancy with proven pelvis up to 7#8oz.   with 2nd pregnancy for fetal distress with LCTV uterine incision verified via op report.  Discussed with patient risks associated with TOLAC including uterine rupture leading to emergency  delivery with possible poor maternal/fetal outcomes.  Pt verb understanding and wishes to proceed.   Reports good fetal movement or fluttering. Denies any vaginal bleeding, leakage of fluid.  Total weight gain/weight loss in pregnancy: 12.7 kg (28 lb)     Vitals  BP: 118/78  Pulse: 85  Weight: 106.1 kg (234 lb)  Prenatal  Fetal Heart Rate: 137  Movement: Present  Edema  LLE Edema: None  RLE Edema: None  Facial: None  Additional Edema?: No    Prenatal Labs:  Lab Results   Component Value Date    GROUPTRH AB POS 2024    HGB 10.1 (L) 2024    HCT 32.9 (L) 2024     2024    SICKLE Negative 2024    HEPBSAG Non-Reactive 2024    [id number] Non-Reactive 2024    LABNGO Positive (A) 2024    LABURIN Skin/Urogenital Roxanne Isolated, no further workup. 2024       A: 36w6d           ICD-10-CM ICD-9-CM    1. Gestational diabetes mellitus (GDM) in third trimester, gestational diabetes method of control unspecified  O24.419 648.83       2. Previous  delivery affecting pregnancy  O34.219 654.20       3. 36 weeks gestation of pregnancy  Z3A.36 V22.2 POCT URINALYSIS W/O SCOPE      4. History of herpes genitalis  Z86.19 V12.09       5. Urinary frequency  R35.0 788.41 POCT URINALYSIS W/O SCOPE      US today EFW: 6lb 12oz ROBERT: 12.6cm Vertex     The following were addressed during this visit:    33-36 Weeks  - Group B Strep Test/HIV/RPR     -Benefits of breastfeeding   -Rooming In and Skin to Skin    P: Bleeding, daily fetal kick counts, and  labor/labor  precautions discussed.    Questions answered to desired level of satisfaction  Verbalized understanding to all information and instructions provided.  Follow up in about 1 week (around 12/10/2024) for OBV.    Rosa Gamboa CNM, TERRENCE-BC

## 2024-12-11 ENCOUNTER — ROUTINE PRENATAL (OUTPATIENT)
Dept: OBSTETRICS AND GYNECOLOGY | Facility: CLINIC | Age: 24
End: 2024-12-11
Payer: MEDICAID

## 2024-12-11 VITALS
BODY MASS INDEX: 37.61 KG/M2 | WEIGHT: 233 LBS | HEART RATE: 88 BPM | DIASTOLIC BLOOD PRESSURE: 62 MMHG | SYSTOLIC BLOOD PRESSURE: 112 MMHG

## 2024-12-11 DIAGNOSIS — O34.219 PREVIOUS CESAREAN DELIVERY AFFECTING PREGNANCY: ICD-10-CM

## 2024-12-11 DIAGNOSIS — R35.0 URINARY FREQUENCY: ICD-10-CM

## 2024-12-11 DIAGNOSIS — Z3A.38 38 WEEKS GESTATION OF PREGNANCY: ICD-10-CM

## 2024-12-11 DIAGNOSIS — Z86.19 HISTORY OF HERPES GENITALIS: ICD-10-CM

## 2024-12-11 DIAGNOSIS — O24.419 GESTATIONAL DIABETES MELLITUS (GDM) IN THIRD TRIMESTER, GESTATIONAL DIABETES METHOD OF CONTROL UNSPECIFIED: Primary | ICD-10-CM

## 2024-12-11 PROCEDURE — 87591 N.GONORRHOEAE DNA AMP PROB: CPT | Mod: ,,, | Performed by: CLINICAL MEDICAL LABORATORY

## 2024-12-11 PROCEDURE — 59426 ANTEPARTUM CARE ONLY: CPT | Mod: S$PBB,TH,, | Performed by: ADVANCED PRACTICE MIDWIFE

## 2024-12-11 PROCEDURE — 99213 OFFICE O/P EST LOW 20 MIN: CPT | Mod: PBBFAC | Performed by: ADVANCED PRACTICE MIDWIFE

## 2024-12-11 PROCEDURE — 99999 PR PBB SHADOW E&M-EST. PATIENT-LVL III: CPT | Mod: PBBFAC,,, | Performed by: ADVANCED PRACTICE MIDWIFE

## 2024-12-11 PROCEDURE — 87491 CHLMYD TRACH DNA AMP PROBE: CPT | Mod: ,,, | Performed by: CLINICAL MEDICAL LABORATORY

## 2024-12-11 NOTE — PROGRESS NOTES
Return OB Visit    24 y.o. female  at 38w0d   She c/o white discharge, no odor, no irritation.  Denies h'ache or visual changes. Pt reports N/V this morning and vomited since being here in exam room. States this happens usually once daily in the mornings.  Pt wishes to proceed with TOLAC.  Reports good fetal movement or fluttering. Denies any vaginal bleeding, leakage of fluid, cramping, contractions, or pressure.   Total weight gain/weight loss in pregnancy: 12.2 kg (27 lb)     Vitals  BP: 112/62  Pulse: 88  Weight: 105.7 kg (233 lb)  Prenatal  Fetal Heart Rate: 120s  Movement: Present  Edema  LLE Edema: None  RLE Edema: None  Facial: None  Additional Edema?: No  Cervical Exam  Dilation: 1.5  Effacement (%): 20  Station: -3  Station (Labor Curve): 8 cm  Dilation/Effacement/Station  Dilation: 1.5  Effacement (%): 20  Station: -3    Prenatal Labs:  Lab Results   Component Value Date    GROUPTRH AB POS 2024    HGB 10.1 (L) 2024    HCT 32.9 (L) 2024     2024    SICKLE Negative 2024    HEPBSAG Non-Reactive 2024    [id number] Non-Reactive 2024    LABNGO Positive (A) 2024    LABURIN Skin/Urogenital Roxanne Isolated, no further workup. 2024       A: 38w0d           ICD-10-CM ICD-9-CM    1. Gestational diabetes mellitus (GDM) in third trimester, gestational diabetes method of control unspecified  O24.419 648.83       2. Previous  delivery affecting pregnancy  O34.219 654.20 Chlamydia/GC, PCR      3. 38 weeks gestation of pregnancy  Z3A.38 V22.2 POCT URINALYSIS W/O SCOPE      4. History of herpes genitalis  Z86.19 V12.09       5. Urinary frequency  R35.0 788.41 POCT URINALYSIS W/O SCOPE          No pregnancy checklist tasks were completed during this visit, and no tasks are pending completion.    -Benefits of breastfeeding   -Rooming In and Skin to Skin    P: Bleeding, daily fetal kick counts, and  labor/labor precautions discussed.  Reviewed  risks associated with TOLAC.  PT verb understanding.  Will recheck pt on Monday next week and make decision on Induction for Wednesday pending cervical exam.      Questions answered to desired level of satisfaction  Verbalized understanding to all information and instructions provided.  Follow up in about 5 days (around 12/16/2024) for OBV.    Rosa Gamboa CNM, TERRENCE-BC

## 2024-12-12 LAB
CHLAMYDIA BY PCR: NEGATIVE
N. GONORRHOEAE (GC) BY PCR: NEGATIVE

## 2024-12-16 ENCOUNTER — ROUTINE PRENATAL (OUTPATIENT)
Dept: OBSTETRICS AND GYNECOLOGY | Facility: CLINIC | Age: 24
End: 2024-12-16
Payer: MEDICAID

## 2024-12-16 VITALS
HEART RATE: 93 BPM | WEIGHT: 233 LBS | DIASTOLIC BLOOD PRESSURE: 78 MMHG | BODY MASS INDEX: 37.61 KG/M2 | SYSTOLIC BLOOD PRESSURE: 118 MMHG

## 2024-12-16 DIAGNOSIS — R35.0 URINARY FREQUENCY: ICD-10-CM

## 2024-12-16 DIAGNOSIS — Z86.19 HISTORY OF HERPES GENITALIS: ICD-10-CM

## 2024-12-16 DIAGNOSIS — Z3A.38 38 WEEKS GESTATION OF PREGNANCY: ICD-10-CM

## 2024-12-16 DIAGNOSIS — O34.219 PREVIOUS CESAREAN DELIVERY AFFECTING PREGNANCY: ICD-10-CM

## 2024-12-16 DIAGNOSIS — O24.419 GESTATIONAL DIABETES MELLITUS (GDM) IN THIRD TRIMESTER, GESTATIONAL DIABETES METHOD OF CONTROL UNSPECIFIED: Primary | ICD-10-CM

## 2024-12-16 LAB
BILIRUB SERPL-MCNC: NORMAL MG/DL
BLOOD URINE, POC: NORMAL
CLARITY, UA: NORMAL
COLOR, UA: NORMAL
GLUCOSE UR QL STRIP: NORMAL
KETONES UR QL STRIP: NORMAL
LEUKOCYTE ESTERASE URINE, POC: NORMAL
NITRITE, POC UA: NORMAL
PH, POC UA: 6.5
PROTEIN, POC: NORMAL
SPECIFIC GRAVITY, POC UA: 1.02
UROBILINOGEN, POC UA: NORMAL

## 2024-12-16 PROCEDURE — 99999 PR PBB SHADOW E&M-EST. PATIENT-LVL III: CPT | Mod: PBBFAC,,, | Performed by: ADVANCED PRACTICE MIDWIFE

## 2024-12-16 PROCEDURE — 59426 ANTEPARTUM CARE ONLY: CPT | Mod: S$PBB,TH,, | Performed by: ADVANCED PRACTICE MIDWIFE

## 2024-12-16 PROCEDURE — 99213 OFFICE O/P EST LOW 20 MIN: CPT | Mod: PBBFAC | Performed by: ADVANCED PRACTICE MIDWIFE

## 2024-12-16 RX ORDER — SODIUM CHLORIDE, SODIUM LACTATE, POTASSIUM CHLORIDE, CALCIUM CHLORIDE 600; 310; 30; 20 MG/100ML; MG/100ML; MG/100ML; MG/100ML
INJECTION, SOLUTION INTRAVENOUS CONTINUOUS
Status: CANCELLED | OUTPATIENT
Start: 2024-12-16

## 2024-12-16 RX ORDER — LIDOCAINE HYDROCHLORIDE 10 MG/ML
10 INJECTION, SOLUTION INFILTRATION; PERINEURAL ONCE AS NEEDED
Status: CANCELLED | OUTPATIENT
Start: 2024-12-16 | End: 2036-05-14

## 2024-12-16 RX ORDER — OXYTOCIN/0.9 % SODIUM CHLORIDE 15/250 ML
95 PLASTIC BAG, INJECTION (ML) INTRAVENOUS CONTINUOUS PRN
Status: CANCELLED | OUTPATIENT
Start: 2024-12-16

## 2024-12-16 RX ORDER — OXYTOCIN-SODIUM CHLORIDE 0.9% IV SOLN 30 UNIT/500ML 30-0.9/5 UT/ML-%
10 SOLUTION INTRAVENOUS ONCE AS NEEDED
Status: CANCELLED | OUTPATIENT
Start: 2024-12-16 | End: 2036-05-14

## 2024-12-16 RX ORDER — ONDANSETRON 4 MG/1
8 TABLET, ORALLY DISINTEGRATING ORAL EVERY 8 HOURS PRN
Status: CANCELLED | OUTPATIENT
Start: 2024-12-16

## 2024-12-16 RX ORDER — OXYTOCIN 10 [USP'U]/ML
10 INJECTION, SOLUTION INTRAMUSCULAR; INTRAVENOUS ONCE AS NEEDED
Status: CANCELLED | OUTPATIENT
Start: 2024-12-16 | End: 2036-05-14

## 2024-12-16 RX ORDER — METHYLERGONOVINE MALEATE 0.2 MG/ML
200 INJECTION INTRAVENOUS ONCE AS NEEDED
Status: CANCELLED | OUTPATIENT
Start: 2024-12-16 | End: 2036-05-14

## 2024-12-16 RX ORDER — DIPHENOXYLATE HYDROCHLORIDE AND ATROPINE SULFATE 2.5; .025 MG/1; MG/1
2 TABLET ORAL EVERY 6 HOURS PRN
Status: CANCELLED | OUTPATIENT
Start: 2024-12-16

## 2024-12-16 RX ORDER — OXYTOCIN-SODIUM CHLORIDE 0.9% IV SOLN 30 UNIT/500ML 30-0.9/5 UT/ML-%
30 SOLUTION INTRAVENOUS ONCE AS NEEDED
Status: CANCELLED | OUTPATIENT
Start: 2024-12-16 | End: 2036-05-14

## 2024-12-16 RX ORDER — OXYTOCIN/0.9 % SODIUM CHLORIDE 15/250 ML
0-32 PLASTIC BAG, INJECTION (ML) INTRAVENOUS CONTINUOUS
Status: CANCELLED | OUTPATIENT
Start: 2024-12-18

## 2024-12-16 RX ORDER — MISOPROSTOL 200 UG/1
800 TABLET ORAL ONCE AS NEEDED
Status: CANCELLED | OUTPATIENT
Start: 2024-12-16 | End: 2036-05-14

## 2024-12-16 RX ORDER — CARBOPROST TROMETHAMINE 250 UG/ML
250 INJECTION, SOLUTION INTRAMUSCULAR
Status: CANCELLED | OUTPATIENT
Start: 2024-12-16

## 2024-12-16 RX ORDER — TRANEXAMIC ACID 10 MG/ML
1000 INJECTION, SOLUTION INTRAVENOUS EVERY 30 MIN PRN
Status: CANCELLED | OUTPATIENT
Start: 2024-12-16

## 2024-12-16 RX ORDER — SODIUM CHLORIDE 9 MG/ML
INJECTION, SOLUTION INTRAVENOUS
Status: CANCELLED | OUTPATIENT
Start: 2024-12-16

## 2024-12-16 RX ORDER — MUPIROCIN 20 MG/G
OINTMENT TOPICAL
Status: CANCELLED | OUTPATIENT
Start: 2024-12-16

## 2024-12-16 RX ORDER — SIMETHICONE 80 MG
1 TABLET,CHEWABLE ORAL 4 TIMES DAILY PRN
Status: CANCELLED | OUTPATIENT
Start: 2024-12-16

## 2024-12-16 RX ORDER — OXYTOCIN/0.9 % SODIUM CHLORIDE 15/250 ML
95 PLASTIC BAG, INJECTION (ML) INTRAVENOUS ONCE AS NEEDED
Status: CANCELLED | OUTPATIENT
Start: 2024-12-16 | End: 2036-05-14

## 2024-12-16 RX ORDER — CALCIUM CARBONATE 200(500)MG
500 TABLET,CHEWABLE ORAL 3 TIMES DAILY PRN
Status: CANCELLED | OUTPATIENT
Start: 2024-12-16

## 2024-12-16 RX ORDER — MISOPROSTOL 200 UG/1
800 TABLET ORAL ONCE AS NEEDED
Status: CANCELLED | OUTPATIENT
Start: 2024-12-16

## 2024-12-16 NOTE — PROGRESS NOTES
Return OB Visit    24 y.o. female  at 38w5d   She c/o cramping and jacquelin christensen.   Reports good fetal movement or fluttering. Denies any vaginal bleeding, leakage of fluid,or pressure.   Total weight gain/weight loss in pregnancy: 12.2 kg (27 lb)     Vitals  BP: 118/78  Pulse: 93  Weight: 105.7 kg (233 lb)  Prenatal  Fetal Heart Rate: 130s  Movement: Present  Edema  LLE Edema: None  RLE Edema: None  Facial: None  Additional Edema?: No  Cervical Exam  Dilation: 2  Effacement (%): 30  Station: -3  Presentation: Vertex  Station (Labor Curve): 8 cm  Dilation/Effacement/Station  Dilation: 2  Effacement (%): 30  Station: -3    Prenatal Labs:  Lab Results   Component Value Date    GROUPTRH AB POS 2024    HGB 10.1 (L) 2024    HCT 32.9 (L) 2024     2024    SICKLE Negative 2024    HEPBSAG Non-Reactive 2024    [id number] Non-Reactive 2024    LABNGO Negative 2024    LABURIN Skin/Urogenital Roxanne Isolated, no further workup. 2024       A: 38w5d           ICD-10-CM ICD-9-CM    1. Gestational diabetes mellitus (GDM) in third trimester, gestational diabetes method of control unspecified  O24.419 648.83 Vital Signs      Vital signs- Early Labor(0-4 cm)      Vital Signs- Active Labor (4-10 cm)      Vital Signs Post Delivery      Check Temperature, Membranes Intact      Check Temperature, Membranes Ruptured      Pulse Oximetry Continous while CONTINUOUS electronic fetal Monitor in use      Cervical Exam      Fetal / Uterine Monitoring - Continuous      Fetal monitoring - scalp electrode      Insert peripheral IV      Solorio to [place]      Solorio Catheter Care every 12 hours      Nurse to discontinue solorio when patient no longer meets criteria      Post Solorio Catheter Removal Protocol      Perform Bladder scan      Perform Intermittent Catheterization      Perform Bladder Scan, post intermittent cath      Place indwelling catheter      Watch for excessive vaginal  bleeding      Chlorhexidine Bath      Decrease Oxytocin      Discontinue oxytocin      Oxytocin Titration Resumption      Amnioinfusion PRN recurrent variable decelerations      Administer a 500 -1000 ml IV fluid bolus as needed for      Assess fundal height/uterine firmness, lochia, perineum      Notify Physician      Notify OB care provider      Notify physician of cervical change or lack of change      Notify physician for excessive uterine activity      Notify physician for Fetal Heart Tones consistent with Category II or Category III tracing      Notify Pediatrician after delivery      Notify Nursery / Level II Nursery      Notify Nursery / Level II Nursery      Abdominal prep for  Section      Chlorhexidine (CHG) 2% Wipes      Chlorohexidine Gluconate Bath      Vital signs every 15 minutes      Vital signs every 15 minutes      miSOPROStoL tablet 800 mcg      miSOPROStoL tablet 800 mcg      CBC with Auto Differential      Comprehensive metabolic panel      Type & Screen      Syphilis Antibody (RPR)      HIV 1/2 Ag/Ab (4th Gen)      Hepatitis B surface antigen      POCT Cord Blood Gas Umbilical Artery Cord Gas      POCT Cord Blood Gas Umbilical Vein Cord Gas      Inpatient consult to Anesthesiology      Full code      Admit to Inpatient      Decrease Oxytocin by 2 mu/min PRN for:      Decrease the oxytocin by 50% if the FHR tracing shows no improvement or there is no resolution of tachysystole within 30 minutes or if the FHR tracing worsens despite the reduction by 2mU/min      Discontinue the oxytocin infusion if the non-reassuring FHR tracing or tachysystole do not resolve within 30 minutes or worsens despite the 50% reduction in the oxytocin rate      For tachysystole :      Decrease Oxytocin by 2 mu/min PRN for tachysystole that persists after lateral position change and (if not contraindicated) an IV fluid bolus      Decrease Oxytocin by 50% if tachysystole persists after decreasing by 2 mu/min  after 30 minutes.      Discontinue Oxytocin PRN if tachysystole persists after decreasing Oxytocin by 50% after 30 minutes.      Resume Oxytocin when uterine activity is appropriate and fetal oxygenation is demonstrated.      Notify OB      Urinalysis, Reflex to Urine Culture      2. Previous  delivery affecting pregnancy  O34.219 654.20       3. 38 weeks gestation of pregnancy  Z3A.38 V22.2 POCT URINALYSIS W/O SCOPE      4. History of herpes genitalis  Z86.19 V12.09       5. Urinary frequency  R35.0 788.41 POCT URINALYSIS W/O SCOPE          The following were addressed during this visit:    37-41 Weeks  - Postpartum Immunizations   - Hospital Stay Expectations   - When to go to the hospital   - Fetal kick counts/PIH/Bleeding/ROM/Labor precautions   - Labor induction policy   - Cervical ripening   - Breastfeeding review: benefits of breastfeeding, early skin to skin, 24/7 rooming in, exclusive breastfeeding for 6 months     -Benefits of breastfeeding   -Rooming In and Skin to Skin    P: Bleeding, daily fetal kick counts, and  labor/labor precautions discussed.    Questions answered to desired level of satisfaction  Verbalized understanding to all information and instructions provided.  No follow-ups on file.    Rosa Gamboa CNM, TERRENCE-BC

## 2024-12-19 ENCOUNTER — ANESTHESIA (OUTPATIENT)
Dept: OBSTETRICS AND GYNECOLOGY | Facility: HOSPITAL | Age: 24
End: 2024-12-19
Payer: MEDICAID

## 2024-12-19 ENCOUNTER — ANESTHESIA EVENT (OUTPATIENT)
Dept: OBSTETRICS AND GYNECOLOGY | Facility: HOSPITAL | Age: 24
End: 2024-12-19
Payer: MEDICAID

## 2024-12-19 ENCOUNTER — HOSPITAL ENCOUNTER (INPATIENT)
Facility: HOSPITAL | Age: 24
LOS: 2 days | Discharge: HOME | End: 2024-12-21
Attending: STUDENT IN AN ORGANIZED HEALTH CARE EDUCATION/TRAINING PROGRAM | Admitting: STUDENT IN AN ORGANIZED HEALTH CARE EDUCATION/TRAINING PROGRAM
Payer: MEDICAID

## 2024-12-19 DIAGNOSIS — O34.219 DESIRES VBAC (VAGINAL BIRTH AFTER CESAREAN) TRIAL: ICD-10-CM

## 2024-12-19 DIAGNOSIS — O34.219 VAGINAL BIRTH AFTER CESAREAN DELIVERY: ICD-10-CM

## 2024-12-19 DIAGNOSIS — O24.419 GESTATIONAL DIABETES MELLITUS (GDM) IN THIRD TRIMESTER, GESTATIONAL DIABETES METHOD OF CONTROL UNSPECIFIED: ICD-10-CM

## 2024-12-19 DIAGNOSIS — O24.415 GESTATIONAL DIABETES MELLITUS (GDM) IN THIRD TRIMESTER CONTROLLED ON ORAL HYPOGLYCEMIC DRUG: ICD-10-CM

## 2024-12-19 DIAGNOSIS — Z3A.39 39 WEEKS GESTATION OF PREGNANCY: Primary | ICD-10-CM

## 2024-12-19 PROBLEM — O13.3 GESTATIONAL HYPERTENSION W/O SIGNIFICANT PROTEINURIA IN 3RD TRIMESTER: Status: RESOLVED | Noted: 2024-11-25 | Resolved: 2024-12-19

## 2024-12-19 PROBLEM — N91.2 AMENORRHEA: Status: RESOLVED | Noted: 2022-07-26 | Resolved: 2024-12-19

## 2024-12-19 PROBLEM — Z86.32 HX OF GESTATIONAL DIABETES MELLITUS, NOT CURRENTLY PREGNANT: Status: RESOLVED | Noted: 2022-07-26 | Resolved: 2024-12-19

## 2024-12-19 LAB
ALBUMIN SERPL BCP-MCNC: 2.7 G/DL (ref 3.5–5)
ALBUMIN/GLOB SERPL: 0.7 {RATIO}
ALP SERPL-CCNC: 89 U/L (ref 40–150)
ALT SERPL W P-5'-P-CCNC: 7 U/L
ANION GAP SERPL CALCULATED.3IONS-SCNC: 15 MMOL/L (ref 7–16)
AST SERPL W P-5'-P-CCNC: 16 U/L (ref 5–34)
BASOPHILS # BLD AUTO: 0.02 K/UL (ref 0–0.2)
BASOPHILS NFR BLD AUTO: 0.2 % (ref 0–1)
BILIRUB SERPL-MCNC: 0.3 MG/DL
BILIRUB UR QL STRIP: NEGATIVE
BUN SERPL-MCNC: 7 MG/DL (ref 7–19)
BUN/CREAT SERPL: 11 (ref 6–20)
CALCIUM SERPL-MCNC: 9.1 MG/DL (ref 8.4–10.2)
CHLORIDE SERPL-SCNC: 107 MMOL/L (ref 98–107)
CLARITY UR: CLEAR
CO2 SERPL-SCNC: 17 MMOL/L (ref 22–29)
COLOR UR: ABNORMAL
CREAT SERPL-MCNC: 0.61 MG/DL (ref 0.55–1.02)
DIFFERENTIAL METHOD BLD: ABNORMAL
EGFR (NO RACE VARIABLE) (RUSH/TITUS): 127 ML/MIN/1.73M2
EOSINOPHIL # BLD AUTO: 0.04 K/UL (ref 0–0.5)
EOSINOPHIL NFR BLD AUTO: 0.5 % (ref 1–4)
ERYTHROCYTE [DISTWIDTH] IN BLOOD BY AUTOMATED COUNT: 15.2 % (ref 11.5–14.5)
GLOBULIN SER-MCNC: 4.1 G/DL (ref 2–4)
GLUCOSE SERPL-MCNC: 121 MG/DL (ref 74–100)
GLUCOSE UR STRIP-MCNC: NORMAL MG/DL
HBV SURFACE AG SERPL QL IA: NORMAL
HCT VFR BLD AUTO: 33.8 % (ref 38–47)
HGB BLD-MCNC: 10.2 G/DL (ref 12–16)
HIV 1+O+2 AB SERPL QL: NORMAL
IMM GRANULOCYTES # BLD AUTO: 0.05 K/UL (ref 0–0.04)
IMM GRANULOCYTES NFR BLD: 0.6 % (ref 0–0.4)
INDIRECT COOMBS: NORMAL
KETONES UR STRIP-SCNC: 40 MG/DL
LEUKOCYTE ESTERASE UR QL STRIP: NEGATIVE
LYMPHOCYTES # BLD AUTO: 1.92 K/UL (ref 1–4.8)
LYMPHOCYTES NFR BLD AUTO: 23.7 % (ref 27–41)
MCH RBC QN AUTO: 24.2 PG (ref 27–31)
MCHC RBC AUTO-ENTMCNC: 30.2 G/DL (ref 32–36)
MCV RBC AUTO: 80.3 FL (ref 80–96)
MONOCYTES # BLD AUTO: 0.69 K/UL (ref 0–0.8)
MONOCYTES NFR BLD AUTO: 8.5 % (ref 2–6)
MPC BLD CALC-MCNC: 11.4 FL (ref 9.4–12.4)
NEUTROPHILS # BLD AUTO: 5.39 K/UL (ref 1.8–7.7)
NEUTROPHILS NFR BLD AUTO: 66.5 % (ref 53–65)
NITRITE UR QL STRIP: NEGATIVE
NRBC # BLD AUTO: 0 X10E3/UL
NRBC, AUTO (.00): 0 %
PH UR STRIP: 6 PH UNITS
PLATELET # BLD AUTO: 226 K/UL (ref 150–400)
POTASSIUM SERPL-SCNC: 3.6 MMOL/L (ref 3.5–5.1)
PROT SERPL-MCNC: 6.8 G/DL (ref 6.4–8.3)
PROT UR QL STRIP: 10
RBC # BLD AUTO: 4.21 M/UL (ref 4.2–5.4)
RBC # UR STRIP: NEGATIVE /UL
RH BLD: NORMAL
SODIUM SERPL-SCNC: 135 MMOL/L (ref 136–145)
SP GR UR STRIP: 1.02
SPECIMEN OUTDATE: NORMAL
SYPHILIS AB INTERPRETATION: NORMAL
UROBILINOGEN UR STRIP-ACNC: NORMAL MG/DL
WBC # BLD AUTO: 8.11 K/UL (ref 4.5–11)

## 2024-12-19 PROCEDURE — 10907ZC DRAINAGE OF AMNIOTIC FLUID, THERAPEUTIC FROM PRODUCTS OF CONCEPTION, VIA NATURAL OR ARTIFICIAL OPENING: ICD-10-PCS | Performed by: ADVANCED PRACTICE MIDWIFE

## 2024-12-19 PROCEDURE — 25000003 PHARM REV CODE 250: Performed by: ANESTHESIOLOGY

## 2024-12-19 PROCEDURE — 3E033VJ INTRODUCTION OF OTHER HORMONE INTO PERIPHERAL VEIN, PERCUTANEOUS APPROACH: ICD-10-PCS | Performed by: ADVANCED PRACTICE MIDWIFE

## 2024-12-19 PROCEDURE — 81003 URINALYSIS AUTO W/O SCOPE: CPT | Performed by: ADVANCED PRACTICE MIDWIFE

## 2024-12-19 PROCEDURE — 11000001 HC ACUTE MED/SURG PRIVATE ROOM

## 2024-12-19 PROCEDURE — 63600175 PHARM REV CODE 636 W HCPCS: Performed by: ANESTHESIOLOGY

## 2024-12-19 PROCEDURE — 85025 COMPLETE CBC W/AUTO DIFF WBC: CPT | Performed by: ADVANCED PRACTICE MIDWIFE

## 2024-12-19 PROCEDURE — 86900 BLOOD TYPING SEROLOGIC ABO: CPT | Performed by: ADVANCED PRACTICE MIDWIFE

## 2024-12-19 PROCEDURE — 86780 TREPONEMA PALLIDUM: CPT | Performed by: ADVANCED PRACTICE MIDWIFE

## 2024-12-19 PROCEDURE — 80053 COMPREHEN METABOLIC PANEL: CPT | Performed by: ADVANCED PRACTICE MIDWIFE

## 2024-12-19 PROCEDURE — C1751 CATH, INF, PER/CENT/MIDLINE: HCPCS | Performed by: ANESTHESIOLOGY

## 2024-12-19 PROCEDURE — 72200005 HC VAGINAL DELIVERY LEVEL II

## 2024-12-19 PROCEDURE — 72100002 HC LABOR CARE, 1ST 8 HOURS

## 2024-12-19 PROCEDURE — 25000003 PHARM REV CODE 250: Performed by: ADVANCED PRACTICE MIDWIFE

## 2024-12-19 PROCEDURE — 72100003 HC LABOR CARE, EA. ADDL. 8 HRS

## 2024-12-19 PROCEDURE — 62326 NJX INTERLAMINAR LMBR/SAC: CPT | Mod: AA | Performed by: ANESTHESIOLOGY

## 2024-12-19 PROCEDURE — 87389 HIV-1 AG W/HIV-1&-2 AB AG IA: CPT | Performed by: ADVANCED PRACTICE MIDWIFE

## 2024-12-19 PROCEDURE — 36415 COLL VENOUS BLD VENIPUNCTURE: CPT | Performed by: ADVANCED PRACTICE MIDWIFE

## 2024-12-19 PROCEDURE — 63600175 PHARM REV CODE 636 W HCPCS: Performed by: ADVANCED PRACTICE MIDWIFE

## 2024-12-19 PROCEDURE — 27000181 HC CABLE, IUPC

## 2024-12-19 PROCEDURE — 87340 HEPATITIS B SURFACE AG IA: CPT | Performed by: ADVANCED PRACTICE MIDWIFE

## 2024-12-19 RX ORDER — OXYTOCIN-SODIUM CHLORIDE 0.9% IV SOLN 30 UNIT/500ML 30-0.9/5 UT/ML-%
10 SOLUTION INTRAVENOUS ONCE AS NEEDED
Status: DISCONTINUED | OUTPATIENT
Start: 2024-12-19 | End: 2024-12-20

## 2024-12-19 RX ORDER — METHYLERGONOVINE MALEATE 0.2 MG/ML
200 INJECTION INTRAVENOUS ONCE AS NEEDED
Status: DISCONTINUED | OUTPATIENT
Start: 2024-12-19 | End: 2024-12-19

## 2024-12-19 RX ORDER — MISOPROSTOL 200 UG/1
800 TABLET ORAL ONCE AS NEEDED
Status: DISCONTINUED | OUTPATIENT
Start: 2024-12-19 | End: 2024-12-21 | Stop reason: HOSPADM

## 2024-12-19 RX ORDER — OXYTOCIN-SODIUM CHLORIDE 0.9% IV SOLN 30 UNIT/500ML 30-0.9/5 UT/ML-%
30 SOLUTION INTRAVENOUS ONCE AS NEEDED
Status: COMPLETED | OUTPATIENT
Start: 2024-12-19 | End: 2024-12-19

## 2024-12-19 RX ORDER — OXYTOCIN-SODIUM CHLORIDE 0.9% IV SOLN 30 UNIT/500ML 30-0.9/5 UT/ML-%
30 SOLUTION INTRAVENOUS ONCE AS NEEDED
Status: DISCONTINUED | OUTPATIENT
Start: 2024-12-19 | End: 2024-12-20

## 2024-12-19 RX ORDER — ONDANSETRON 4 MG/1
8 TABLET, ORALLY DISINTEGRATING ORAL EVERY 8 HOURS PRN
Status: DISCONTINUED | OUTPATIENT
Start: 2024-12-19 | End: 2024-12-20

## 2024-12-19 RX ORDER — OXYTOCIN/0.9 % SODIUM CHLORIDE 15/250 ML
95 PLASTIC BAG, INJECTION (ML) INTRAVENOUS ONCE AS NEEDED
Status: DISCONTINUED | OUTPATIENT
Start: 2024-12-19 | End: 2024-12-21 | Stop reason: HOSPADM

## 2024-12-19 RX ORDER — METHYLERGONOVINE MALEATE 0.2 MG/ML
200 INJECTION INTRAVENOUS ONCE AS NEEDED
Status: DISCONTINUED | OUTPATIENT
Start: 2024-12-19 | End: 2024-12-21 | Stop reason: HOSPADM

## 2024-12-19 RX ORDER — MUPIROCIN 20 MG/G
OINTMENT TOPICAL
Status: DISCONTINUED | OUTPATIENT
Start: 2024-12-19 | End: 2024-12-20

## 2024-12-19 RX ORDER — CARBOPROST TROMETHAMINE 250 UG/ML
250 INJECTION, SOLUTION INTRAMUSCULAR
Status: DISCONTINUED | OUTPATIENT
Start: 2024-12-19 | End: 2024-12-20

## 2024-12-19 RX ORDER — PRENATAL WITH FERROUS FUM AND FOLIC ACID 3080; 920; 120; 400; 22; 1.84; 3; 20; 10; 1; 12; 200; 27; 25; 2 [IU]/1; [IU]/1; MG/1; [IU]/1; MG/1; MG/1; MG/1; MG/1; MG/1; MG/1; UG/1; MG/1; MG/1; MG/1; MG/1
1 TABLET ORAL DAILY
Status: DISCONTINUED | OUTPATIENT
Start: 2024-12-20 | End: 2024-12-21 | Stop reason: HOSPADM

## 2024-12-19 RX ORDER — SIMETHICONE 80 MG
1 TABLET,CHEWABLE ORAL 4 TIMES DAILY PRN
Status: DISCONTINUED | OUTPATIENT
Start: 2024-12-19 | End: 2024-12-19

## 2024-12-19 RX ORDER — DIPHENHYDRAMINE HYDROCHLORIDE 50 MG/ML
25 INJECTION INTRAMUSCULAR; INTRAVENOUS EVERY 4 HOURS PRN
Status: DISCONTINUED | OUTPATIENT
Start: 2024-12-19 | End: 2024-12-21 | Stop reason: HOSPADM

## 2024-12-19 RX ORDER — IBUPROFEN 800 MG/1
800 TABLET ORAL EVERY 6 HOURS PRN
Status: DISCONTINUED | OUTPATIENT
Start: 2024-12-19 | End: 2024-12-21 | Stop reason: HOSPADM

## 2024-12-19 RX ORDER — MUPIROCIN 20 MG/G
OINTMENT TOPICAL
Status: DISCONTINUED | OUTPATIENT
Start: 2024-12-19 | End: 2024-12-19

## 2024-12-19 RX ORDER — MISOPROSTOL 200 UG/1
800 TABLET ORAL ONCE AS NEEDED
Status: DISCONTINUED | OUTPATIENT
Start: 2024-12-19 | End: 2024-12-19

## 2024-12-19 RX ORDER — CALCIUM CARBONATE 200(500)MG
500 TABLET,CHEWABLE ORAL 3 TIMES DAILY PRN
Status: DISCONTINUED | OUTPATIENT
Start: 2024-12-19 | End: 2024-12-21 | Stop reason: HOSPADM

## 2024-12-19 RX ORDER — EPHEDRINE SULFATE 50 MG/ML
10 INJECTION, SOLUTION INTRAVENOUS ONCE
Status: DISCONTINUED | OUTPATIENT
Start: 2024-12-19 | End: 2024-12-19

## 2024-12-19 RX ORDER — DIPHENOXYLATE HYDROCHLORIDE AND ATROPINE SULFATE 2.5; .025 MG/1; MG/1
2 TABLET ORAL EVERY 6 HOURS PRN
Status: DISCONTINUED | OUTPATIENT
Start: 2024-12-19 | End: 2024-12-21 | Stop reason: HOSPADM

## 2024-12-19 RX ORDER — ONDANSETRON HYDROCHLORIDE 2 MG/ML
4 INJECTION, SOLUTION INTRAVENOUS EVERY 6 HOURS PRN
Status: DISCONTINUED | OUTPATIENT
Start: 2024-12-19 | End: 2024-12-21 | Stop reason: HOSPADM

## 2024-12-19 RX ORDER — FAMOTIDINE 10 MG/ML
20 INJECTION INTRAVENOUS
Status: DISCONTINUED | OUTPATIENT
Start: 2024-12-19 | End: 2024-12-21 | Stop reason: HOSPADM

## 2024-12-19 RX ORDER — OXYTOCIN/0.9 % SODIUM CHLORIDE 15/250 ML
0-32 PLASTIC BAG, INJECTION (ML) INTRAVENOUS CONTINUOUS
Status: DISCONTINUED | OUTPATIENT
Start: 2024-12-19 | End: 2024-12-19

## 2024-12-19 RX ORDER — ACETAMINOPHEN 325 MG/1
650 TABLET ORAL EVERY 6 HOURS SCHEDULED
Status: DISCONTINUED | OUTPATIENT
Start: 2024-12-20 | End: 2024-12-20

## 2024-12-19 RX ORDER — LIDOCAINE HYDROCHLORIDE 10 MG/ML
10 INJECTION, SOLUTION INFILTRATION; PERINEURAL ONCE AS NEEDED
Status: DISCONTINUED | OUTPATIENT
Start: 2024-12-19 | End: 2024-12-21 | Stop reason: HOSPADM

## 2024-12-19 RX ORDER — HYDROCODONE BITARTRATE AND ACETAMINOPHEN 5; 325 MG/1; MG/1
1 TABLET ORAL EVERY 4 HOURS PRN
Status: DISCONTINUED | OUTPATIENT
Start: 2024-12-19 | End: 2024-12-21 | Stop reason: HOSPADM

## 2024-12-19 RX ORDER — SODIUM CHLORIDE, SODIUM LACTATE, POTASSIUM CHLORIDE, CALCIUM CHLORIDE 600; 310; 30; 20 MG/100ML; MG/100ML; MG/100ML; MG/100ML
INJECTION, SOLUTION INTRAVENOUS CONTINUOUS
Status: DISCONTINUED | OUTPATIENT
Start: 2024-12-19 | End: 2024-12-21 | Stop reason: HOSPADM

## 2024-12-19 RX ORDER — OXYTOCIN/0.9 % SODIUM CHLORIDE 15/250 ML
95 PLASTIC BAG, INJECTION (ML) INTRAVENOUS CONTINUOUS PRN
Status: DISCONTINUED | OUTPATIENT
Start: 2024-12-19 | End: 2024-12-21 | Stop reason: HOSPADM

## 2024-12-19 RX ORDER — ONDANSETRON 4 MG/1
8 TABLET, ORALLY DISINTEGRATING ORAL EVERY 8 HOURS PRN
Status: DISCONTINUED | OUTPATIENT
Start: 2024-12-19 | End: 2024-12-21 | Stop reason: HOSPADM

## 2024-12-19 RX ORDER — SODIUM CITRATE AND CITRIC ACID MONOHYDRATE 334; 500 MG/5ML; MG/5ML
30 SOLUTION ORAL
Status: DISCONTINUED | OUTPATIENT
Start: 2024-12-19 | End: 2024-12-19

## 2024-12-19 RX ORDER — FENTANYL/ROPIVACAINE/NS/PF 2MCG/ML-.2
PLASTIC BAG, INJECTION (ML) INJECTION CONTINUOUS
Status: DISCONTINUED | OUTPATIENT
Start: 2024-12-19 | End: 2024-12-19

## 2024-12-19 RX ORDER — TRANEXAMIC ACID 10 MG/ML
1000 INJECTION, SOLUTION INTRAVENOUS EVERY 30 MIN PRN
Status: DISCONTINUED | OUTPATIENT
Start: 2024-12-19 | End: 2024-12-19

## 2024-12-19 RX ORDER — DOCUSATE SODIUM 100 MG/1
200 CAPSULE, LIQUID FILLED ORAL 2 TIMES DAILY PRN
Status: DISCONTINUED | OUTPATIENT
Start: 2024-12-19 | End: 2024-12-21 | Stop reason: HOSPADM

## 2024-12-19 RX ORDER — LIDOCAINE HYDROCHLORIDE 20 MG/ML
10 INJECTION, SOLUTION EPIDURAL; INFILTRATION; INTRACAUDAL; PERINEURAL EVERY 5 MIN PRN
Status: DISCONTINUED | OUTPATIENT
Start: 2024-12-19 | End: 2024-12-20

## 2024-12-19 RX ORDER — HYDROCORTISONE 25 MG/G
CREAM TOPICAL 3 TIMES DAILY PRN
Status: DISCONTINUED | OUTPATIENT
Start: 2024-12-19 | End: 2024-12-21 | Stop reason: HOSPADM

## 2024-12-19 RX ORDER — DIPHENOXYLATE HYDROCHLORIDE AND ATROPINE SULFATE 2.5; .025 MG/1; MG/1
2 TABLET ORAL EVERY 6 HOURS PRN
Status: DISCONTINUED | OUTPATIENT
Start: 2024-12-19 | End: 2024-12-20

## 2024-12-19 RX ORDER — OXYTOCIN 10 [USP'U]/ML
10 INJECTION, SOLUTION INTRAMUSCULAR; INTRAVENOUS ONCE AS NEEDED
Status: DISCONTINUED | OUTPATIENT
Start: 2024-12-19 | End: 2024-12-19

## 2024-12-19 RX ORDER — DIPHENHYDRAMINE HCL 25 MG
25 CAPSULE ORAL EVERY 4 HOURS PRN
Status: DISCONTINUED | OUTPATIENT
Start: 2024-12-19 | End: 2024-12-21 | Stop reason: HOSPADM

## 2024-12-19 RX ORDER — SODIUM CHLORIDE 0.9 % (FLUSH) 0.9 %
10 SYRINGE (ML) INJECTION
Status: DISCONTINUED | OUTPATIENT
Start: 2024-12-19 | End: 2024-12-21 | Stop reason: HOSPADM

## 2024-12-19 RX ORDER — SIMETHICONE 80 MG
1 TABLET,CHEWABLE ORAL EVERY 6 HOURS PRN
Status: DISCONTINUED | OUTPATIENT
Start: 2024-12-19 | End: 2024-12-21 | Stop reason: HOSPADM

## 2024-12-19 RX ORDER — LIDOCAINE HYDROCHLORIDE 20 MG/ML
INJECTION, SOLUTION EPIDURAL; INFILTRATION; INTRACAUDAL; PERINEURAL
Status: COMPLETED | OUTPATIENT
Start: 2024-12-19 | End: 2024-12-19

## 2024-12-19 RX ORDER — OXYTOCIN 10 [USP'U]/ML
10 INJECTION, SOLUTION INTRAMUSCULAR; INTRAVENOUS ONCE AS NEEDED
Status: DISCONTINUED | OUTPATIENT
Start: 2024-12-19 | End: 2024-12-21 | Stop reason: HOSPADM

## 2024-12-19 RX ORDER — CEFAZOLIN 2 G/1
2 INJECTION, POWDER, FOR SOLUTION INTRAMUSCULAR; INTRAVENOUS ONCE AS NEEDED
Status: DISCONTINUED | OUTPATIENT
Start: 2024-12-19 | End: 2024-12-19

## 2024-12-19 RX ORDER — SODIUM CHLORIDE 9 MG/ML
INJECTION, SOLUTION INTRAVENOUS
Status: DISCONTINUED | OUTPATIENT
Start: 2024-12-19 | End: 2024-12-19

## 2024-12-19 RX ORDER — TRANEXAMIC ACID 10 MG/ML
1000 INJECTION, SOLUTION INTRAVENOUS EVERY 30 MIN PRN
Status: DISCONTINUED | OUTPATIENT
Start: 2024-12-19 | End: 2024-12-21 | Stop reason: HOSPADM

## 2024-12-19 RX ORDER — CARBOPROST TROMETHAMINE 250 UG/ML
250 INJECTION, SOLUTION INTRAMUSCULAR
Status: DISCONTINUED | OUTPATIENT
Start: 2024-12-19 | End: 2024-12-21 | Stop reason: HOSPADM

## 2024-12-19 RX ADMIN — OXYTOCIN 2 MILLI-UNITS/MIN: 10 INJECTION INTRAVENOUS at 05:12

## 2024-12-19 RX ADMIN — LIDOCAINE HYDROCHLORIDE 200 MG: 20 INJECTION, SOLUTION EPIDURAL; INFILTRATION; INTRACAUDAL; PERINEURAL at 02:12

## 2024-12-19 RX ADMIN — AMPICILLIN SODIUM 1 G: 1 INJECTION, POWDER, FOR SOLUTION INTRAMUSCULAR; INTRAVENOUS at 01:12

## 2024-12-19 RX ADMIN — AMPICILLIN SODIUM 2 G: 2 INJECTION, POWDER, FOR SOLUTION INTRAMUSCULAR; INTRAVENOUS at 05:12

## 2024-12-19 RX ADMIN — OXYTOCIN-SODIUM CHLORIDE 0.9% IV SOLN 30 UNIT/500ML 30 UNITS: 30-0.9/5 SOLUTION at 08:12

## 2024-12-19 RX ADMIN — ROPIVACAINE HYDROCHLORIDE 12 ML/HR: 10 INJECTION, SOLUTION EPIDURAL at 02:12

## 2024-12-19 RX ADMIN — OXYTOCIN 95 MILLI-UNITS/MIN: 10 INJECTION INTRAVENOUS at 10:12

## 2024-12-19 RX ADMIN — AMPICILLIN SODIUM 1 G: 1 INJECTION, POWDER, FOR SOLUTION INTRAMUSCULAR; INTRAVENOUS at 09:12

## 2024-12-19 RX ADMIN — SODIUM CHLORIDE, POTASSIUM CHLORIDE, SODIUM LACTATE AND CALCIUM CHLORIDE: 600; 310; 30; 20 INJECTION, SOLUTION INTRAVENOUS at 12:12

## 2024-12-19 RX ADMIN — SODIUM CHLORIDE, POTASSIUM CHLORIDE, SODIUM LACTATE AND CALCIUM CHLORIDE: 600; 310; 30; 20 INJECTION, SOLUTION INTRAVENOUS at 05:12

## 2024-12-19 RX ADMIN — AMPICILLIN SODIUM 1 G: 1 INJECTION, POWDER, FOR SOLUTION INTRAMUSCULAR; INTRAVENOUS at 05:12

## 2024-12-19 RX ADMIN — ONDANSETRON 8 MG: 4 TABLET, ORALLY DISINTEGRATING ORAL at 08:12

## 2024-12-19 RX ADMIN — LIDOCAINE HYDROCHLORIDE 10 ML: 20 INJECTION, SOLUTION INTRAVENOUS at 02:12

## 2024-12-19 RX ADMIN — ROPIVACAINE HYDROCHLORIDE 500 MCG: 10 INJECTION, SOLUTION EPIDURAL at 02:12

## 2024-12-19 NOTE — ANESTHESIA PREPROCEDURE EVALUATION
2024  Alida Tompkins is a 24 y.o., female.      Pre-op Assessment    I have reviewed the Patient Summary Reports.    I have reviewed the NPO Status.   I have reviewed the Medications.     Review of Systems         Anesthesia Plan  Type of Anesthesia, risks & benefits discussed:    Anesthesia Type: Epidural  Intra-op Monitoring Plan: Standard ASA Monitors  Informed Consent: Informed consent signed with the Patient and all parties understand the risks and agree with anesthesia plan.  All questions answered.   ASA Score: 2    Ready For Surgery From Anesthesia Perspective.     .  No known anesthetic complications  NKDA    Hct 34     at 39 weeks    Adequate ROM at neck    Plan is labor epidural

## 2024-12-19 NOTE — H&P
[facility] -  Labor and Delivery  Obstetrics  History & Physical    Patient Name: Alida Tompkins  MRN: [id number]  Admission Date: 2024  Primary Care Provider: Sheryl, Primary Doctor    Subjective:     Principal Problem:Gestational diabetes mellitus (GDM) in third trimester controlled on oral hypoglycemic drug    History of Present Illness: Admitted for TOLAC.  Reviewed Risks associated including uterine rupture leading to emergency  delivery with possible poor maternal/fetal outcome.  Pt wishes to proceed.  Pitocin is currently infusing per protocol.  Denies pain at present.  Plans for epidural anesthesia for pain mgt when needed.  Ampicillin was started on admission for + GBS status.  Reports fetus active, denies LOF or vaginal bleeding.   Pt has had adequate PN care  Had vaginal delivery in  prior to  with proven pelvic up to 7#8oz.    in  for non-reassuring FHTs, confirmed LCTV uterine incision per op report    EFW on  Ultrasound 6#12oz    This pregnancy has been complicated by gestational diabetes (metformin BID), obesity, previous  x 1, GBS Positive, HSV (has been on valtrex since 36wk), + GC/CT (treated , CATIE negative on )    OB History    Para Term  AB Living   3 2 2 0 0 2   SAB IAB Ectopic Multiple Live Births   0 0 0 0 2      # Outcome Date GA Lbr Joshua/2nd Weight Sex Type Anes PTL Lv   3 Current            2 Term 22 39w0d  3.835 kg (8 lb 7.3 oz) M CS-LTranv EPI  JOSE      Complications: Fetal Intolerance      Name: SHARIF TOMPKINS      Apgar1: 9  Apgar5: 9   1 Term 17 39w0d  3.402 kg (7 lb 8 oz) F Vag-Spont EPI  JOSE     Past Medical History:   Diagnosis Date    Bacterial vaginosis 2022    Elevated blood sugar 2022    Encounter for cervical Pap smear with pelvic exam 8/15/2022    Encounter for initial prescription of vaginal ring hormonal contraceptive 8/15/2022    Gestational diabetes mellitus (GDM) in third  trimester 11/15/2021    Gestational diabetes mellitus (GDM) in third trimester 11/15/2021    Gestational hypertension, third trimester 11/15/2021    Herpes simplex virus (HSV) infection     High risk medication use 2022    S/P  section 2022    Vaginal discharge 2022     Past Surgical History:   Procedure Laterality Date     SECTION N/A 2022    Procedure:  SECTION;  Surgeon: Michelle Cuadra MD;  Location: [facility] L&D;  Service: OB/GYN;  Laterality: N/A;       PTA Medications   Medication Sig    aspirin (ECOTRIN) 81 MG EC tablet Take 81 mg by mouth once daily.    cholecalciferol, vitamin D3, (VITAMIN D3) 125 mcg (5,000 unit) Tab Take 1 tablet (5,000 Units total) by mouth once daily.    ferrous sulfate 325 (65 FE) MG EC tablet Take 1 tablet (325 mg total) by mouth 2 (two) times daily.    metFORMIN (GLUCOPHAGE) 500 MG tablet Take 1 tablet (500 mg total) by mouth 2 (two) times daily with meals.    prenatal vit no.124/iron/folic (PRENATAL VITAMIN ORAL) Take 1 each by mouth Daily.    valACYclovir (VALTREX) 1000 MG tablet Take 1 tablet (1,000 mg total) by mouth once daily.    valACYclovir (VALTREX) 500 MG tablet Take 1 tablet (500 mg total) by mouth once daily.       Review of patient's allergies indicates:  No Known Allergies     Family History       Problem Relation (Age of Onset)    Diabetes Mother    Hypertension Father, Mother, Brother    No Known Problems Brother, Daughter          Tobacco Use    Smoking status: Never     Passive exposure: Never    Smokeless tobacco: Never   Substance and Sexual Activity    Alcohol use: Not Currently    Drug use: Not Currently     Types: Marijuana     Comment: last time she used was about 2-3 weeks ago    Sexual activity: Yes     Partners: Male     Birth control/protection: None     Review of Systems   Constitutional:  Negative for fever.   Eyes:  Negative for visual disturbance.   Respiratory:  Negative for shortness of breath.     Cardiovascular:  Negative for leg swelling.   Gastrointestinal:  Negative for abdominal pain, nausea and vomiting.   Genitourinary:  Negative for vaginal bleeding.   Musculoskeletal:  Negative for back pain.   Neurological:  Negative for headaches.      Objective:     Vital Signs (Most Recent):  Temp: 98.5 °F (36.9 °C) (12/19/24 0706)  Pulse: 78 (12/19/24 0753)  Resp: 18 (12/19/24 0706)  BP: 110/60 (12/19/24 0753)  SpO2: 98 % (12/19/24 0706) Vital Signs (24h Range):  Temp:  [97.7 °F (36.5 °C)-98.5 °F (36.9 °C)] 98.5 °F (36.9 °C)  Pulse:  [] 78  Resp:  [18-20] 18  SpO2:  [98 %-99 %] 98 %  BP: (106-123)/(60-67) 110/60     Weight: 103.7 kg (228 lb 9.6 oz)  Body mass index is 36.9 kg/m².    FHT: 120 Cat 1 (reassuring)  TOCO:  Q 2-4 minutes    Physical Exam:   Constitutional: She is oriented to person, place, and time. She appears well-developed and well-nourished. No distress.    HENT:   Head: Normocephalic.      Cardiovascular:  Normal rate and regular rhythm.      Exam reveals no edema.        Pulmonary/Chest: Effort normal and breath sounds normal.        Abdominal: Soft.   Gravid, non-tender             Musculoskeletal: No edema.       Neurological: She is alert and oriented to person, place, and time. She has normal reflexes.    Skin: Skin is warm and dry.    Psychiatric: She has a normal mood and affect.       Cervix:  Dilation:  2  Effacement:  50%  Station: -3  Presentation: Vertex Exam per admitting RN     Significant Labs:  Lab Results   Component Value Date    GROUPTRH AB POS 12/19/2024    HEPBSAG Non-Reactive 12/19/2024       CBC:   Recent Labs   Lab 12/19/24  0536   WBC 8.11   RBC 4.21   HGB 10.2*   HCT 33.8*      MCV 80.3   MCH 24.2*   MCHC 30.2*     CMP:   Recent Labs   Lab 12/19/24  0536   *   CALCIUM 9.1   ALBUMIN 2.7*   PROT 6.8   *   K 3.6   CO2 17*      BUN 7   CREATININE 0.61   ALKPHOS 89   ALT 7   AST 16   BILITOT 0.3     I have personallly reviewed all pertinent  lab results from the last 24 hours.    Assessment/Plan:     24 y.o. female  at 39w1d for:    Active Diagnoses:    Diagnosis Date Noted POA    PRINCIPAL PROBLEM:  Gestational diabetes mellitus (GDM) in third trimester controlled on oral hypoglycemic drug [O24.415] 2024 Yes    History of herpes genitalis [Z86.19] 2024 Yes    Previous  delivery affecting pregnancy [O34.219] 2024 Yes    Gonorrhea affecting pregnancy in third trimester [O98.213] 2024 Yes    39 weeks gestation of pregnancy [Z3A.39] 2024 Not Applicable      Problems Resolved During this Admission:    Diagnosis Date Noted Date Resolved POA    Gestational hypertension w/o significant proteinuria in 3rd trimester [O13.3] 2024 Yes    Gestational diabetes mellitus (GDM) in third trimester [O24.419] 11/15/2021 2024 Yes     Continue current mgt  AROM after 2nd dose of Ampicillin if needed  Epidural when pt desires  Dr Pritchett aware of pt status and in agreement with BERKLEY Gamboa CNM  Obstetrics  [facility] -  Labor and Delivery

## 2024-12-19 NOTE — PLAN OF CARE
Problem: Diabetes in Pregnancy  Goal: Optimal Coping  Outcome: Progressing  Goal: Blood Glucose Level Within Targeted Range  Outcome: Progressing     Problem:  Fall Injury Risk  Goal: Absence of Fall, Infant Drop and Related Injury  Outcome: Progressing     Problem: Adult Inpatient Plan of Care  Goal: Plan of Care Review  Outcome: Progressing  Goal: Patient-Specific Goal (Individualized)  Outcome: Progressing  Goal: Absence of Hospital-Acquired Illness or Injury  Outcome: Progressing  Goal: Optimal Comfort and Wellbeing  Outcome: Progressing  Goal: Readiness for Transition of Care  Outcome: Progressing     Problem: Infection  Goal: Absence of Infection Signs and Symptoms  Outcome: Progressing     Problem: Labor  Goal: Hemostasis  Outcome: Progressing  Goal: Stable Fetal Wellbeing  Outcome: Progressing  Goal: Effective Progression to Delivery  Outcome: Progressing  Goal: Absence of Infection Signs and Symptoms  Outcome: Progressing  Goal: Acceptable Pain Control  Outcome: Progressing  Goal: Normal Uterine Contraction Pattern  Outcome: Progressing     Problem: Hypertensive Disorders in Pregnancy  Goal: Patient-Fetal Stabilization  Outcome: Progressing

## 2024-12-19 NOTE — ANESTHESIA PROCEDURE NOTES
Epidural    Patient location during procedure: OB   Reason for block: primary anesthetic   Reason for block: labor analgesia requested by patient and obstetrician  Diagnosis: IUP   Start time: 12/19/2024 2:05 PM  Timeout: 12/19/2024 2:04 PM  End time: 12/19/2024 2:14 PM    Staffing  Performing Provider: Vinod Nicholson MD  Authorizing Provider: Vinod Nicholson MD    Staffing  Performed by: Vinod Nicholson MD  Authorized by: Vinod Nicholson MD        Preanesthetic Checklist  Completed: patient identified, pre-op evaluation, timeout performed, anesthesia consent given, hand hygiene performed and patient being monitored  Preparation  Patient position: sitting  Prep: Betadine  Reason for block: primary anesthetic   Epidural  Skin Anesthetic: lidocaine 1%  Administration type: single shot  Approach: midline  Interspace: L4-5    Injection technique: NEAL air  Needle and Epidural Catheter  Insertion Attempts: 1  Test dose: 3 mL of lidocaine 1.5% with Epi 1-to-200,000  Additional Documentation: negative aspiration for heme and CSF  Needle localization: anatomical landmarks  Assessment  Ease of block: easy  Additional Notes  Informed consent. Aseptic technique.   L4/5 epidural catheter. Standard PCEA.   No complications.         Medications:    Medications: LIDOcaine (PF) 20 mg/mL (2%) injection - Epidural   10 mL - 12/19/2024 2:13:00 PM

## 2024-12-19 NOTE — NURSING
"Patient given "Your Guide to Postpartum and  Care" for education during this hospital stay. Booklet provided with information resources. Patient oriented on how to use QR codes and find topics in book, and oriented to flip book in patient room. Patient verbalized that they are able to read and understand material provided to them. Instructed to call nurses for questions. Mom educated on benefits of skin to skin for at least 1 hour after delivery, the benefits of rooming in with infant in helping mom to learn and respond to feeding cues, caring for their infant, and bonding, and the importance on exclusive breastfeeding for the first 6 months.    Topic Nurse Date Time Comments   PP Education Booklet Given ST   600    Skin to skin ST  06    Rooming in ST  06    Importance of Exclusive Breastfeeding for 6 mo ST  06      Bleeding       Incision Care       Sex       Pain Management       Perineal Care       Minimizing Engorgement       Collection & Storage of BM       S/S of BF Problems       Hand Expression       Maternal s/s breast problems       Mgnt of Common BF Problems (engorgement, sore & cracked nipples)       PPD/Anxiety         "

## 2024-12-20 PROBLEM — O98.213 GONORRHEA AFFECTING PREGNANCY IN THIRD TRIMESTER: Status: RESOLVED | Noted: 2024-11-25 | Resolved: 2024-12-20

## 2024-12-20 LAB
BASOPHILS # BLD AUTO: 0.04 K/UL (ref 0–0.2)
BASOPHILS NFR BLD AUTO: 0.3 % (ref 0–1)
DIFFERENTIAL METHOD BLD: ABNORMAL
EOSINOPHIL # BLD AUTO: 0.04 K/UL (ref 0–0.5)
EOSINOPHIL NFR BLD AUTO: 0.3 % (ref 1–4)
ERYTHROCYTE [DISTWIDTH] IN BLOOD BY AUTOMATED COUNT: 15.2 % (ref 11.5–14.5)
GLUCOSE SERPL-MCNC: 101 MG/DL (ref 70–105)
GLUCOSE SERPL-MCNC: 101 MG/DL (ref 70–105)
GLUCOSE SERPL-MCNC: 104 MG/DL (ref 70–105)
HCT VFR BLD AUTO: 30.7 % (ref 38–47)
HGB BLD-MCNC: 9.6 G/DL (ref 12–16)
IMM GRANULOCYTES # BLD AUTO: 0.04 K/UL (ref 0–0.04)
IMM GRANULOCYTES NFR BLD: 0.3 % (ref 0–0.4)
LYMPHOCYTES # BLD AUTO: 1.65 K/UL (ref 1–4.8)
LYMPHOCYTES NFR BLD AUTO: 14.1 % (ref 27–41)
MCH RBC QN AUTO: 24.5 PG (ref 27–31)
MCHC RBC AUTO-ENTMCNC: 31.3 G/DL (ref 32–36)
MCV RBC AUTO: 78.3 FL (ref 80–96)
MONOCYTES # BLD AUTO: 0.97 K/UL (ref 0–0.8)
MONOCYTES NFR BLD AUTO: 8.3 % (ref 2–6)
MPC BLD CALC-MCNC: 11.8 FL (ref 9.4–12.4)
NEUTROPHILS # BLD AUTO: 9 K/UL (ref 1.8–7.7)
NEUTROPHILS NFR BLD AUTO: 76.7 % (ref 53–65)
NRBC # BLD AUTO: 0 X10E3/UL
NRBC, AUTO (.00): 0 %
PLATELET # BLD AUTO: 214 K/UL (ref 150–400)
RBC # BLD AUTO: 3.92 M/UL (ref 4.2–5.4)
WBC # BLD AUTO: 11.74 K/UL (ref 4.5–11)

## 2024-12-20 PROCEDURE — 11000001 HC ACUTE MED/SURG PRIVATE ROOM

## 2024-12-20 PROCEDURE — 85025 COMPLETE CBC W/AUTO DIFF WBC: CPT | Performed by: ADVANCED PRACTICE MIDWIFE

## 2024-12-20 PROCEDURE — 25000003 PHARM REV CODE 250: Performed by: ADVANCED PRACTICE MIDWIFE

## 2024-12-20 PROCEDURE — 27000491 HC MATTRESS, SOFT CARE OVERLAY

## 2024-12-20 PROCEDURE — 82962 GLUCOSE BLOOD TEST: CPT

## 2024-12-20 PROCEDURE — 36415 COLL VENOUS BLD VENIPUNCTURE: CPT | Performed by: ADVANCED PRACTICE MIDWIFE

## 2024-12-20 RX ORDER — OXYCODONE HYDROCHLORIDE 5 MG/1
10 TABLET ORAL EVERY 4 HOURS PRN
Status: ACTIVE | OUTPATIENT
Start: 2024-12-20 | End: 2024-12-21

## 2024-12-20 RX ORDER — IBUPROFEN 800 MG/1
800 TABLET ORAL EVERY 6 HOURS PRN
Qty: 30 TABLET | Refills: 0 | Status: SHIPPED | OUTPATIENT
Start: 2024-12-20

## 2024-12-20 RX ORDER — KETOROLAC TROMETHAMINE 30 MG/ML
30 INJECTION, SOLUTION INTRAMUSCULAR; INTRAVENOUS EVERY 6 HOURS
Status: ACTIVE | OUTPATIENT
Start: 2024-12-20 | End: 2024-12-21

## 2024-12-20 RX ORDER — ONDANSETRON HYDROCHLORIDE 2 MG/ML
4 INJECTION, SOLUTION INTRAVENOUS EVERY 6 HOURS PRN
Status: ACTIVE | OUTPATIENT
Start: 2024-12-20 | End: 2024-12-21

## 2024-12-20 RX ORDER — ACETAMINOPHEN 325 MG/1
650 TABLET ORAL EVERY 6 HOURS PRN
Status: DISCONTINUED | OUTPATIENT
Start: 2024-12-20 | End: 2024-12-21 | Stop reason: HOSPADM

## 2024-12-20 RX ORDER — OXYCODONE HYDROCHLORIDE 5 MG/1
5 TABLET ORAL EVERY 4 HOURS PRN
Status: ACTIVE | OUTPATIENT
Start: 2024-12-20 | End: 2024-12-21

## 2024-12-20 RX ORDER — ACETAMINOPHEN 325 MG/1
650 TABLET ORAL EVERY 6 HOURS
Status: ACTIVE | OUTPATIENT
Start: 2024-12-20 | End: 2024-12-21

## 2024-12-20 RX ADMIN — IBUPROFEN 800 MG: 800 TABLET, FILM COATED ORAL at 08:12

## 2024-12-20 RX ADMIN — IBUPROFEN 800 MG: 800 TABLET, FILM COATED ORAL at 03:12

## 2024-12-20 RX ADMIN — Medication 1 TABLET: at 07:12

## 2024-12-20 NOTE — DISCHARGE SUMMARY
[facility] -  Labor and Delivery  Obstetrics  Discharge Summary      Patient Name: Alida Tompkins  MRN: [id number]  Admission Date: 2024  Hospital Length of Stay: 1 days  Discharge Date and Time: {IP DISCHARGE DATE:[id number]}  Attending Physician: Dae Shaw DO   Discharging Provider: Rosa Pizarro CNM  Primary Care Provider: Sheryl, Primary Doctor    HPI: Pt is awake and alert with no s/s distress.  Tolerating ambulation and regular diet.  Pain is well controlled with prn meds.  Voiding and passing gas.  Abd soft, non-tender. Small amount lochia rubra. No edema lower extrem.      * No surgery found *     Hospital Course:  delivery 24, nuchal cord x 1 and true knot.  Postpartum course has been uncomplicated.  Infant is doing well and expected to be discharged home with mom.     Consults (From admission, onward)          Status Ordering Provider     Inpatient consult to Anesthesiology  Once        Provider:  (Not yet assigned)    Acknowledged DAE SHAW     Inpatient consult to Anesthesiology  Once        Provider:  (Not yet assigned)    Acknowledged ROSA PIZARRO            Final Active Diagnoses:    Diagnosis Date Noted POA    PRINCIPAL PROBLEM:  Vaginal birth after  delivery [O34.219] 2024 No    Nuchal cord without compression, delivered, current hospitalization [O69.81X0] 2024 No    Desires  (vaginal birth after ) trial [O34.219] 2024 Yes    History of herpes genitalis [Z86.19] 2024 Yes    Previous  delivery affecting pregnancy [O34.219] 2024 Yes    39 weeks gestation of pregnancy [Z3A.39] 2024 Not Applicable    Gestational diabetes mellitus (GDM) in third trimester controlled on oral hypoglycemic drug [O24.415] 2024 Yes      Problems Resolved During this Admission:    Diagnosis Date Noted Date Resolved POA    Gestational hypertension w/o significant proteinuria in 3rd trimester [O13.3] 2024  "2024 Yes    Gonorrhea affecting pregnancy in third trimester [O98.213] 2024 Yes    Gestational diabetes mellitus (GDM) in third trimester [O24.419] 11/15/2021 2024 Yes        Labs: CBC   Recent Labs   Lab 24  0536 24  0426   WBC 8.11 11.74*   HGB 10.2* 9.6*   HCT 33.8* 30.7*    214       Feeding Method: breast    Immunizations       Date Immunization Status Dose Route/Site Given by    24 MMR Incomplete 0.5 mL Subcutaneous/     24 Tdap Incomplete 0.5 mL Intramuscular/             Delivery:    Episiotomy: None   Lacerations: None   Repair suture: None   Repair # of packets:     Blood loss (ml):       Birth information:  YOB: 2026   Time of birth: 8:48 PM   Sex: male   Delivery type: , Spontaneous   Gestational Age: 39w1d     Measurements    Weight: 3521 g  Weight (lbs): 7 lb 12.2 oz  Length: 50.8 cm  Length (in): 20"         Delivery Clinician: Delivery Providers    Delivering clinician: Rosa Gamboa CNM   Provider Role    Fransisco Ortiz RN Registered Nurse             Additional  information:  Forceps:    Vacuum:    Breech:    Observed anomalies      Living?:     Apgars    Living status: Living  Apgar Component Scores:  1 min.:  5 min.:  10 min.:  15 min.:  20 min.:    Skin color:  0  1       Heart rate:  2  2       Reflex irritability:  2  2       Muscle tone:  2  2       Respiratory effort:  2  2       Total:  8  9       Apgars assigned by: MICHAEL MARSHALL RN         Placenta: Delivered:       appearance  Pending Diagnostic Studies:       None            Discharged Condition: good    Disposition: Home or Self Care    Follow Up:   Follow-up Information       Rosa Gamboa CNM Follow up in 3 week(s).    Specialty: Obstetrics and Gynecology  Why: Postpartum Check  Contact information:  [address]  [facility] [address]  [phone]                           Patient Instructions:      Pelvic Rest   Order Comments: 6 weeks "     No driving until:   Order Comments: 1-2 weeks     Notify your health care provider if you experience any of the following:  increased confusion or weakness     Notify your health care provider if you experience any of the following:  persistent dizziness, light-headedness, or visual disturbances     Notify your health care provider if you experience any of the following:  worsening rash     Notify your health care provider if you experience any of the following:  severe persistent headache     Notify your health care provider if you experience any of the following:  difficulty breathing or increased cough     Notify your health care provider if you experience any of the following:  severe uncontrolled pain     Notify your health care provider if you experience any of the following:  persistent nausea and vomiting or diarrhea     Notify your health care provider if you experience any of the following:  temperature >100.4     Pelvic Rest   Order Comments: 6 weeks     No driving until:   Order Comments: 1-2 weeks     Notify your health care provider if you experience any of the following:  increased confusion or weakness     Notify your health care provider if you experience any of the following:  persistent dizziness, light-headedness, or visual disturbances     Notify your health care provider if you experience any of the following:  worsening rash     Notify your health care provider if you experience any of the following:  severe persistent headache     Notify your health care provider if you experience any of the following:  difficulty breathing or increased cough     Notify your health care provider if you experience any of the following:  severe uncontrolled pain     Notify your health care provider if you experience any of the following:  persistent nausea and vomiting or diarrhea     Notify your health care provider if you experience any of the following:  temperature >100.4     Medications:  Current  Discharge Medication List        START taking these medications    Details   ibuprofen (ADVIL,MOTRIN) 800 MG tablet Take 1 tablet (800 mg total) by mouth every 6 (six) hours as needed (cramping)... take with food  Qty: 30 tablet, Refills: 0           CONTINUE these medications which have NOT CHANGED    Details   cholecalciferol, vitamin D3, (VITAMIN D3) 125 mcg (5,000 unit) Tab Take 1 tablet (5,000 Units total) by mouth once daily.  Qty: 30 tablet, Refills: 11    Associated Diagnoses: Gestational diabetes mellitus (GDM) in second trimester, gestational diabetes method of control unspecified; 23 weeks gestation of pregnancy; Vitamin D deficiency      ferrous sulfate 325 (65 FE) MG EC tablet Take 1 tablet (325 mg total) by mouth 2 (two) times daily.  Qty: 60 tablet, Refills: 11    Associated Diagnoses: Anemia during pregnancy in third trimester      prenatal vit no.124/iron/folic (PRENATAL VITAMIN ORAL) Take 1 each by mouth Daily.      valACYclovir (VALTREX) 1000 MG tablet Take 1 tablet (1,000 mg total) by mouth once daily.  Qty: 30 tablet, Refills: 11    Associated Diagnoses: History of herpes genitalis           STOP taking these medications       aspirin (ECOTRIN) 81 MG EC tablet Comments:   Reason for Stopping:         metFORMIN (GLUCOPHAGE) 500 MG tablet Comments:   Reason for Stopping:               Rosa Gamboa CNM  Obstetrics  [facility] -  Labor and Delivery

## 2024-12-20 NOTE — ANESTHESIA POSTPROCEDURE EVALUATION
Anesthesia Post Evaluation    Patient: Alida Tompkins    Procedure(s) Performed: * No procedures listed *    Final Anesthesia Type: epidural      Patient location during evaluation: labor & delivery  Post-procedure vital signs: reviewed and stable  Pain management: adequate  Airway patency: patent  JC mitigation strategies: Use of major conduction anesthesia (spinal/epidural) or peripheral nerve block  PONV status at discharge: No PONV  Anesthetic complications: no      Cardiovascular status: hemodynamically stable  Respiratory status: unassisted  Hydration status: euvolemic  Follow-up not needed.              Vitals Value Taken Time   /66 12/20/24 0333   Temp 36.5 °C (97.7 °F) 12/19/24 2250   Pulse 100 12/20/24 0333   Resp 16 12/19/24 2250   SpO2 100 % 12/19/24 2250   Vitals shown include unfiled device data.      No case tracking events are documented in the log.      Pain/John Score: Pain Rating Prior to Med Admin: 3 (12/20/2024  3:33 AM)

## 2024-12-20 NOTE — PROGRESS NOTES
[facility] -  Labor and Delivery  Obstetrics  Postpartum Progress Note    Patient Name: Alida Tompkins  MRN: [id number]  Admission Date: 2024  Hospital Length of Stay: 1 days  Attending Physician: Jon Pritchett DO  Primary Care Provider: Sheryl, Primary Doctor    Subjective:     Principal Problem:Gestational diabetes mellitus (GDM) in third trimester controlled on oral hypoglycemic drug    Hospital course:  delivery 24, epidural anesthesia    Interval History: Postpartum Day 1    She is doing well this morning. She is tolerating a regular diet without nausea or vomiting. She is voiding spontaneously. She is ambulating. She has passed flatus, and has not a BM. Vaginal bleeding is mild. She denies fever or chills. Abdominal pain is mild and controlled with oral medications. She Is breastfeeding. She desires circumcision for her male baby: yes.     Objective:     Vital Signs (Most Recent):  Temp: 98.1 °F (36.7 °C) (24 0333)  Pulse: 100 (24 0333)  Resp: 18 (24 0333)  BP: 120/66 (24 0333)  SpO2: 100 % (24 0333) Vital Signs (24h Range):  Temp:  [97.7 °F (36.5 °C)-99 °F (37.2 °C)] 98.1 °F (36.7 °C)  Pulse:  [] 100  Resp:  [16-18] 18  SpO2:  [98 %-100 %] 100 %  BP: ()/(47-83) 120/66     Weight: 103.7 kg (228 lb 9.6 oz)  Body mass index is 36.9 kg/m².      Intake/Output Summary (Last 24 hours) at 2024 0900  Last data filed at 2024 0014  Gross per 24 hour   Intake 2803.23 ml   Output 387 ml   Net 2416.23 ml       Physical Exam:   Constitutional: She is oriented to person, place, and time. She appears well-developed and well-nourished.    HENT:   Head: Normocephalic.      Cardiovascular:  Normal rate and regular rhythm.             Pulmonary/Chest: Effort normal and breath sounds normal.        Abdominal: Soft. Bowel sounds are normal.   Fundus firm, non-tender     Genitourinary:    Genitourinary Comments: Perineum well-approximated              Musculoskeletal: Normal range of motion.       Neurological: She is alert and oriented to person, place, and time.    Skin: Skin is warm and dry.    Psychiatric: She has a normal mood and affect.       Significant Labs:  Lab Results   Component Value Date    GROUPTRH AB POS 2024    HEPBSAG Non-Reactive 2024     Recent Labs   Lab 24  0426   HGB 9.6*   HCT 30.7*       CBC:   Recent Labs   Lab 24  0426   WBC 11.74*   RBC 3.92*   HGB 9.6*   HCT 30.7*      MCV 78.3*   MCH 24.5*   MCHC 31.3*     I have personallly reviewed all pertinent lab results from the last 24 hours.    Assessment/Plan:     24 y.o. female  at 39w1d for:    Active Diagnoses:    Diagnosis Date Noted POA    PRINCIPAL PROBLEM:  Gestational diabetes mellitus (GDM) in third trimester controlled on oral hypoglycemic drug [O24.415] 2024 Yes    Nuchal cord without compression, delivered, current hospitalization [O69.81X0] 2024 Unknown    Desires  (vaginal birth after ) trial [O34.219] 2024 Unknown    Vaginal birth after  delivery [O34.219] 2024 Unknown    History of herpes genitalis [Z86.19] 2024 Yes    Previous  delivery affecting pregnancy [O34.219] 2024 Yes    Gonorrhea affecting pregnancy in third trimester [O98.213] 2024 Yes    39 weeks gestation of pregnancy [Z3A.39] 2024 Not Applicable      Problems Resolved During this Admission:    Diagnosis Date Noted Date Resolved POA    Gestational hypertension w/o significant proteinuria in 3rd trimester [O13.3] 2024 Yes    Gestational diabetes mellitus (GDM) in third trimester [O24.419] 11/15/2021 2024 Yes       Routine postpartum care    Disposition: As patient meets milestones, will plan to discharge tomorrow.    Rosa Gamboa CNM  Obstetrics  [facility] -  Labor and Delivery

## 2024-12-20 NOTE — L&D DELIVERY NOTE
[facility] -  Labor and Delivery  Vaginal Delivery   Operative Note    SUMMARY   Progressed to complete and pushing, epidural in place. Normal spontaneous vaginal delivery of live male infant, shoulders and body delivered without difficulty.  Spontaneous cry and respirations noted and infantwas placed on mothers abdomen for skin to skin and bulb suctioning performed. Infant delivered position CYNTHIA over intact perineum. Nuchal cord x1: Yes, cord reduced at perineum and melita knot.  Delayed cord clamping done, cord blood collected.     Spontaneous delivery of placenta and IV pitocin given noting good uterine tone.  Placenta intact with 3V cord.  Perineum inspected No lacerations noted.  Vaginal vault checked for clots, fundus firm with small amount lochia noted. Patient tolerated delivery well. Sponge needle and lap counted correctly x2.    Indications: Gestational diabetes mellitus (GDM) in third trimester controlled on oral hypoglycemic drug  Pregnancy complicated by:   Patient Active Problem List   Diagnosis    Sexual behavior with high risk of exposure to communicable disease    Gestational diabetes mellitus (GDM) in third trimester controlled on oral hypoglycemic drug    39 weeks gestation of pregnancy    Gonorrhea affecting pregnancy in third trimester    Previous  delivery affecting pregnancy    History of herpes genitalis    Nuchal cord without compression, delivered, current hospitalization    Desires  (vaginal birth after ) trial    Vaginal birth after  delivery     Admitting GA: 39w1d    Delivery Information for Otilio Tompkins    Birth information:  YOB: 2026   Time of birth: 8:48 PM   Sex: male   Head Delivery Date/Time: 2024  8:47 PM   Delivery type: , Spontaneous   Gestational Age: 39w1d       Delivery Providers    Delivering clinician: Rosa Gamboa CNM   Provider Role    Fransisco Ortiz, RN Registered Nurse              Measurements   "  Weight: 3521 g  Weight (lbs): 7 lb 12.2 oz  Length: 50.8 cm  Length (in): 20"         Apgars    Living status: Living  Apgar Component Scores:  1 min.:  5 min.:  10 min.:  15 min.:  20 min.:    Skin color:  0  1       Heart rate:  2  2       Reflex irritability:  2  2       Muscle tone:  2  2       Respiratory effort:  2  2       Total:  8  9       Apgars assigned by: MICHAEL MARSHALL RN         Operative Delivery    Forceps attempted?: No  Vacuum extractor attempted?: No         Shoulder Dystocia    Shoulder dystocia present?: No           Presentation    Presentation: Vertex  Position: Right Occiput Anterior           Interventions/Resuscitation    Method: Bulb Suctioning, Tactile Stimulation       Cord    Vessels: 3 vessels  Complications: Nuchal, Knot  Nuchal Intervention: reduced  Nuchal Cord Description: loose nuchal cord  Number of Loops: 1  Delayed Cord Clamping?: Yes  Cord Clamped Date/Time: 2024  8:49 PM  Cord Blood Disposition: Sent with Baby  Gases Sent?: No  Stem Cell Collection (by MD): No       Placenta    Placenta delivery date/time: 2024  Placenta removal: Expressed  Placenta appearance: Intact  Placenta disposition: Discarded           Labor Events:       labor: No     Labor Onset Date/Time: 2024 11:46     Dilation Complete Date/Time: 2024 19:47     Start Pushing Date/Time: 2024 20:34     Rupture Date/Time: 24 114        Rupture type: ARM (Artificial Rupture)        Fluid Amount:       Fluid Color: Clear      steroids:       Antibiotics given for GBS: Yes     Induction: oxytocin     Indications for induction:  Hypertension;Gestational Diabetic     Augmentation:       Indications for augmentation:       Labor complications: None     Additional complications:          Cervical ripening:                     Delivery:      Episiotomy: None     Indication for Episiotomy:       Perineal Lacerations: None Repaired:      Periurethral Laceration:   " Repaired:     Labial Laceration:   Repaired:     Sulcus Laceration:   Repaired:     Vaginal Laceration:   Repaired:     Cervical Laceration:   Repaired:     Repair suture: None     Repair # of packets:       Last Value - EBL - Nursing (mL):       Sum - EBL - Nursing (mL): 0     Last Value - EBL - Anesthesia (mL):      Calculated QBL (mL): 137     Running total QBL (mL): 137     Vaginal Sweep Performed: Yes     Surgicount Correct: Yes       Other providers:       Anesthesia    Method: Epidural          Details (if applicable):  Trial of Labor      Categorization:      Priority:     Indications for :     Incision Type:       Additional  information:  Forceps:    Vacuum:    Breech:    Observed anomalies    Other (Comments):

## 2024-12-20 NOTE — PLAN OF CARE
Problem: Diabetes in Pregnancy  Goal: Optimal Coping  Outcome: Met  Goal: Blood Glucose Level Within Targeted Range  Outcome: Met     Problem:  Fall Injury Risk  Goal: Absence of Fall, Infant Drop and Related Injury  Outcome: Met     Problem: Adult Inpatient Plan of Care  Goal: Plan of Care Review  Outcome: Met  Goal: Patient-Specific Goal (Individualized)  Outcome: Met  Goal: Absence of Hospital-Acquired Illness or Injury  Outcome: Met  Goal: Optimal Comfort and Wellbeing  Outcome: Met  Goal: Readiness for Transition of Care  Outcome: Met     Problem: Infection  Goal: Absence of Infection Signs and Symptoms  Outcome: Met     Problem: Labor  Goal: Hemostasis  Outcome: Met  Goal: Stable Fetal Wellbeing  Outcome: Met  Goal: Effective Progression to Delivery  Outcome: Met  Goal: Absence of Infection Signs and Symptoms  Outcome: Met  Goal: Acceptable Pain Control  Outcome: Met  Goal: Normal Uterine Contraction Pattern  Outcome: Met     Problem: Hypertensive Disorders in Pregnancy  Goal: Patient-Fetal Stabilization  Outcome: Met

## 2024-12-21 VITALS
RESPIRATION RATE: 18 BRPM | HEIGHT: 66 IN | OXYGEN SATURATION: 97 % | WEIGHT: 228.63 LBS | HEART RATE: 78 BPM | DIASTOLIC BLOOD PRESSURE: 71 MMHG | SYSTOLIC BLOOD PRESSURE: 119 MMHG | BODY MASS INDEX: 36.74 KG/M2 | TEMPERATURE: 98 F

## 2024-12-21 LAB — GLUCOSE SERPL-MCNC: 85 MG/DL (ref 70–105)

## 2024-12-21 PROCEDURE — 25000003 PHARM REV CODE 250: Performed by: ADVANCED PRACTICE MIDWIFE

## 2024-12-21 PROCEDURE — 82962 GLUCOSE BLOOD TEST: CPT

## 2024-12-21 RX ADMIN — Medication 1 TABLET: at 09:12

## 2024-12-21 NOTE — DISCHARGE INSTRUCTIONS
Topic Page  Nurse Date Time Comments   PP Education Booklet Given ---- ke 12/21/2024 0750  Booklet at bedside       Skin to skin 18 ke     Rooming in 29 ke     Importance of Exclusive Breastfeeding for 6 mo 35 ke     Bleeding 6 ke     Incision Care 10 ke     Sex 11 ke     Pain Management 8 ke     Perineal Care 9 ke     Minimizing Engorgement 40 ke     Collection & Storage of BM 42-43 ke     S/S of BF Problems  ke     Hand Expression 42 ke     Maternal s/s breast problems 41 ke     Mgnt of Common BF Problems (engorgement, sore & cracked nipples) 40-41 ke     PPD/Anxiety 14-15 ke

## 2025-01-06 ENCOUNTER — POSTPARTUM VISIT (OUTPATIENT)
Dept: OBSTETRICS AND GYNECOLOGY | Facility: CLINIC | Age: 25
End: 2025-01-06
Payer: MEDICAID

## 2025-01-06 VITALS
OXYGEN SATURATION: 100 % | BODY MASS INDEX: 34.23 KG/M2 | RESPIRATION RATE: 19 BRPM | HEIGHT: 66 IN | DIASTOLIC BLOOD PRESSURE: 78 MMHG | SYSTOLIC BLOOD PRESSURE: 118 MMHG | WEIGHT: 213 LBS | HEART RATE: 80 BPM

## 2025-01-06 DIAGNOSIS — Z13.32 ENCOUNTER FOR SCREENING FOR MATERNAL DEPRESSION: Primary | ICD-10-CM

## 2025-01-06 DIAGNOSIS — Z30.017 ENCOUNTER FOR INITIAL PRESCRIPTION OF IMPLANTABLE SUBDERMAL CONTRACEPTIVE: ICD-10-CM

## 2025-01-06 DIAGNOSIS — O34.219 VAGINAL BIRTH AFTER CESAREAN (VBAC): ICD-10-CM

## 2025-01-06 DIAGNOSIS — Z30.09 GENERAL COUNSELING AND ADVICE ON CONTRACEPTIVE MANAGEMENT: ICD-10-CM

## 2025-01-06 PROCEDURE — 1111F DSCHRG MED/CURRENT MED MERGE: CPT | Mod: CPTII,,, | Performed by: ADVANCED PRACTICE MIDWIFE

## 2025-01-06 PROCEDURE — 99214 OFFICE O/P EST MOD 30 MIN: CPT | Mod: PBBFAC | Performed by: ADVANCED PRACTICE MIDWIFE

## 2025-01-06 PROCEDURE — 0503F POSTPARTUM CARE VISIT: CPT | Mod: S$PBB,,, | Performed by: ADVANCED PRACTICE MIDWIFE

## 2025-01-06 PROCEDURE — 99999 PR PBB SHADOW E&M-EST. PATIENT-LVL IV: CPT | Mod: PBBFAC,,, | Performed by: ADVANCED PRACTICE MIDWIFE

## 2025-01-06 NOTE — PROGRESS NOTES
Subjective:       Alida Tompkins is a 24 y.o. female who presents for a postpartum visit. She is 2-3 weeks postpartum following a spontaneous vaginal delivery. I have fully reviewed the prenatal and intrapartum course. The delivery was at 39 gestational weeks. Outcome: vaginal birth after  (). Anesthesia: epidural. Postpartum course has been uncomplicated. Baby's course has been uncomplicated. Baby is feeding by breast. Bleeding thin lochia. Bowel function is normal. Bladder function is normal. Patient is not sexually active. Contraception method is abstinence. Postpartum depression screening: negative.    The following portions of the patient's history were reviewed and updated as appropriate: allergies, current medications, past family history, past medical history, past social history, past surgical history, and problem list.    Review of Systems  Pertinent items are noted in HPI.   OB History    Para Term  AB Living   3 3 3 0 0 3   SAB IAB Ectopic Multiple Live Births   0 0 0 0 3      # Outcome Date GA Lbr Joshua/2nd Weight Sex Type Anes PTL Lv   3 Term 24 39w1d 08:01 :01 3.521 kg (7 lb 12.2 oz) M  EPI N JOSE      Name: Harry Washington      Apgar1: 8  Apgar5: 9   2 Term 22 39w0d  3.835 kg (8 lb 7.3 oz) M CS-LTranv EPI  JOSE      Complications: Fetal Intolerance      Name: SHARIF TOMPKINS      Apgar1: 9  Apgar5: 9   1 Term 17 39w0d  3.402 kg (7 lb 8 oz) F Vag-Spont EPI  JOSE      Past Medical History:   Diagnosis Date    Bacterial vaginosis 2022    Elevated blood sugar 2022    Encounter for cervical Pap smear with pelvic exam 8/15/2022    Encounter for initial prescription of vaginal ring hormonal contraceptive 8/15/2022    Gestational diabetes mellitus (GDM) in third trimester 11/15/2021    Gestational diabetes mellitus (GDM) in third trimester 11/15/2021    Gestational hypertension, third trimester 11/15/2021    Herpes simplex virus (HSV) infection     High  "risk medication use 2022    S/P  section 2022    Vaginal discharge 2022      Past Surgical History:   Procedure Laterality Date     SECTION N/A 2022    Procedure:  SECTION;  Surgeon: Michelle Cuadra MD;  Location: [facility] L&D;  Service: OB/GYN;  Laterality: N/A;      Objective:      /78 (BP Location: Right arm, Patient Position: Sitting)   Pulse 80   Resp 19   Ht 5' 6" (1.676 m)   Wt 96.6 kg (213 lb)   LMP 2024   SpO2 100%   Breastfeeding Yes   BMI 34.38 kg/m²    General:  alert, appears stated age, and cooperative    Breasts:  deferred   Lungs: clear to auscultation bilaterally   Heart:  regular rate and rhythm   Abdomen: soft, non-tender; bowel sounds normal; no masses,  no organomegaly     Assessment:     1. Encounter for screening for maternal depression        2. Lactating mother        3. Vaginal birth after  ()        4. General counseling and advice on contraceptive management        5. Encounter for initial prescription of implantable subdermal contraceptive  Device Authorization Order    Insertion of Nexplanon Device        Plan:      1. Contraception:  Nexplanon   Discussed options for contraception including Depo Provera injections [id number], Nexplanon, Oral contraception,  Patchs, NuvaRing, and IUDs  Discussed R/B of each method and pt wishes to proceed with Nexplanon.  No sex 2 weeks prior to insertion.    2. Follow up in: 2 weeks for Nexplanon insertion or as needed.     "

## 2025-01-23 ENCOUNTER — PROCEDURE VISIT (OUTPATIENT)
Dept: OBSTETRICS AND GYNECOLOGY | Facility: CLINIC | Age: 25
End: 2025-01-23
Payer: MEDICAID

## 2025-01-23 VITALS
HEIGHT: 66 IN | WEIGHT: 218.81 LBS | HEART RATE: 114 BPM | BODY MASS INDEX: 35.17 KG/M2 | DIASTOLIC BLOOD PRESSURE: 78 MMHG | SYSTOLIC BLOOD PRESSURE: 124 MMHG | OXYGEN SATURATION: 97 %

## 2025-01-23 DIAGNOSIS — Z30.017 ENCOUNTER FOR INITIAL PRESCRIPTION OF IMPLANTABLE SUBDERMAL CONTRACEPTIVE: ICD-10-CM

## 2025-01-23 DIAGNOSIS — Z30.017 NEXPLANON INSERTION: ICD-10-CM

## 2025-01-23 PROCEDURE — 11981 INSERTION DRUG DLVR IMPLANT: CPT | Mod: PBBFAC | Performed by: ADVANCED PRACTICE MIDWIFE

## 2025-01-23 PROCEDURE — 0503F POSTPARTUM CARE VISIT: CPT | Mod: CPTII,,, | Performed by: ADVANCED PRACTICE MIDWIFE

## 2025-01-23 PROCEDURE — 99999PBSHW PR PBB SHADOW TECHNICAL ONLY FILED TO HB: Mod: PBBFAC,,,

## 2025-01-23 RX ADMIN — ETONOGESTREL 68 MG: 68 IMPLANT SUBCUTANEOUS at 09:01

## 2025-01-23 NOTE — PROGRESS NOTES
"Subjective:       Alida Tompkins is a 24 y.o. female who presents for a postpartum visit. She is 6 weeks postpartum following a . I have fully reviewed the prenatal and intrapartum course. The delivery was at 39 gestational weeks. Outcome: vaginal birth after  (). Anesthesia: epidural. Postpartum course has been uncomplicated. Baby's course has been uncomplicated. Baby is feeding by both breast and bottle . Bleeding no bleeding. Bowel function is normal. Bladder function is normal. Patient is not sexually active since delivery. Contraception method is abstinence. Postpartum depression screening: negative.  Wants to proceed with Nexplanon insertion today.  Declines pap collection, wants to come back for this in 1 month.    The following portions of the patient's history were reviewed and updated as appropriate: allergies, current medications, past family history, past medical history, past social history, past surgical history, and problem list.    Review of Systems  Pertinent items are noted in HPI.   OB History    Para Term  AB Living   3 3 3 0 0 3   SAB IAB Ectopic Multiple Live Births   0 0 0 0 3      # Outcome Date GA Lbr Joshua/2nd Weight Sex Type Anes PTL Lv   3 Term 24 39w1d 08:01 / 01:01 3.521 kg (7 lb 12.2 oz) M  EPI N JOSE      Name: Harry Washington      Apgar1: 8  Apgar5: 9   2 Term 22 39w0d  3.835 kg (8 lb 7.3 oz) M CS-LTranv EPI  JOSE      Complications: Fetal Intolerance      Name: SHARIF TOMPKINS      Apgar1: 9  Apgar5: 9   1 Term 17 39w0d  3.402 kg (7 lb 8 oz) F Vag-Spont EPI  JOSE      Objective:      /78 (BP Location: Left arm, Patient Position: Sitting)   Pulse (!) 114   Ht 5' 6" (1.676 m)   Wt 99.2 kg (218 lb 12.8 oz)   LMP 2024   SpO2 97%   BMI 35.32 kg/m²    General:  alert, appears stated age, and cooperative    Breasts:  deferred   Lungs: clear to auscultation bilaterally   Heart:  regular rate and rhythm   Abdomen: soft, " non-tender; bowel sounds normal; no masses,  no organomegaly     Assessment:     1. Postpartum care and examination        2. Lactating mother        3. Encounter for initial prescription of implantable subdermal contraceptive  Insertion of Nexplanon Device      4. Nexplanon insertion          Plan:      1. Contraception:  Nexplanon  2. Nexplanon insertion today.  Reviewed R/B/A and pt wishes to proceed.  3. Follow up in: 1 month for pap or as needed.

## 2025-01-23 NOTE — PROCEDURES
Insertion of Nexplanon Device    Date/Time: 1/23/2025 9:15 AM    Performed by: Rosa Gamboa CNM  Authorized by: Rosa Gamboa CNM    Consent:   Consent obtained:  Prior to procedure the appropriate consent was completed and verified  Consent given by:  Patient  Patient questions answered: yes    Patient agrees, verbalizes understanding, and wants to proceed: yes    Educational handouts given: yes    Instructions and paperwork completed: yes    Pre-Procedure:   Prepped with: chlorhexidine gluconate    Local anesthetic:  Lidocaine 1%  The site was cleaned and prepped in a sterile fashion: yes    Insertion Procedure:   Small stab incision was made in arm: yes    Left/right:  left arm   68 mg etonogestreL 68 mg  Preloaded Implanon trocar was placed subdermally: yes    Visualization of implant was obtained: yes    Nexplanon was inserted and trocar removed: yes    Visualization of notch in stilette and palpitation of device: yes    Palpation confirms placement by provider and patient: yes    Site was closed with steri-strips and pressure bandage applied: yes

## 2025-01-27 ENCOUNTER — PATIENT MESSAGE (OUTPATIENT)
Dept: OBSTETRICS AND GYNECOLOGY | Facility: CLINIC | Age: 25
End: 2025-01-27
Payer: MEDICAID

## 2025-08-05 ENCOUNTER — OFFICE VISIT (OUTPATIENT)
Dept: OBSTETRICS AND GYNECOLOGY | Facility: CLINIC | Age: 25
End: 2025-08-05
Payer: MEDICAID

## 2025-08-05 VITALS
SYSTOLIC BLOOD PRESSURE: 122 MMHG | WEIGHT: 218 LBS | BODY MASS INDEX: 35.19 KG/M2 | DIASTOLIC BLOOD PRESSURE: 78 MMHG | HEART RATE: 78 BPM

## 2025-08-05 DIAGNOSIS — R19.4 FREQUENT BOWEL MOVEMENTS: ICD-10-CM

## 2025-08-05 DIAGNOSIS — K64.9 HEMORRHOIDS, UNSPECIFIED HEMORRHOID TYPE: ICD-10-CM

## 2025-08-05 DIAGNOSIS — Z11.3 SCREEN FOR STD (SEXUALLY TRANSMITTED DISEASE): Primary | ICD-10-CM

## 2025-08-05 DIAGNOSIS — N89.8 VAGINAL DISCHARGE: ICD-10-CM

## 2025-08-05 DIAGNOSIS — Z12.4 SCREENING FOR CERVICAL CANCER: ICD-10-CM

## 2025-08-05 LAB
BACTERIAL VAGINOSIS DNA (OHS): NEGATIVE
CANDIDA GLABRATA/KRUSEI DNA (OHS): NOT DETECTED
CANDIDA SPECIES DNA (OHS): NOT DETECTED
CHLAMYDIA BY PCR: NEGATIVE
N. GONORRHOEAE (GC) BY PCR: NEGATIVE
TRICHOMONAS VAGINALIS DNA (OHS): NOT DETECTED

## 2025-08-05 PROCEDURE — 99214 OFFICE O/P EST MOD 30 MIN: CPT | Mod: PBBFAC | Performed by: ADVANCED PRACTICE MIDWIFE

## 2025-08-05 PROCEDURE — 88141 CYTOPATH C/V INTERPRET: CPT | Mod: ,,, | Performed by: PATHOLOGY

## 2025-08-05 PROCEDURE — 88142 CYTOPATH C/V THIN LAYER: CPT | Mod: TC,GCY | Performed by: ADVANCED PRACTICE MIDWIFE

## 2025-08-05 PROCEDURE — 99214 OFFICE O/P EST MOD 30 MIN: CPT | Mod: S$PBB,,, | Performed by: ADVANCED PRACTICE MIDWIFE

## 2025-08-05 PROCEDURE — 1159F MED LIST DOCD IN RCRD: CPT | Mod: CPTII,,, | Performed by: ADVANCED PRACTICE MIDWIFE

## 2025-08-05 PROCEDURE — 87491 CHLMYD TRACH DNA AMP PROBE: CPT | Mod: ,,, | Performed by: CLINICAL MEDICAL LABORATORY

## 2025-08-05 PROCEDURE — 81515 NFCT DS BV&VAGINITIS DNA ALG: CPT | Mod: QW,,, | Performed by: CLINICAL MEDICAL LABORATORY

## 2025-08-05 PROCEDURE — 3078F DIAST BP <80 MM HG: CPT | Mod: CPTII,,, | Performed by: ADVANCED PRACTICE MIDWIFE

## 2025-08-05 PROCEDURE — 87591 N.GONORRHOEAE DNA AMP PROB: CPT | Mod: ,,, | Performed by: CLINICAL MEDICAL LABORATORY

## 2025-08-05 PROCEDURE — 3074F SYST BP LT 130 MM HG: CPT | Mod: CPTII,,, | Performed by: ADVANCED PRACTICE MIDWIFE

## 2025-08-05 PROCEDURE — 3008F BODY MASS INDEX DOCD: CPT | Mod: CPTII,,, | Performed by: ADVANCED PRACTICE MIDWIFE

## 2025-08-05 PROCEDURE — 99999 PR PBB SHADOW E&M-EST. PATIENT-LVL IV: CPT | Mod: PBBFAC,,, | Performed by: ADVANCED PRACTICE MIDWIFE

## 2025-08-05 NOTE — PROGRESS NOTES
Subjective     Patient ID: Alida Tompkins is a 25 y.o. female.    Chief Complaint: Annual Exam (last pap was 08/15/2022. Pt wants a STD testing done and pt having hemorrhoid issues )    C/o frequent bowel movements, sometimes 6-7 times a day, and hemorrhoids that are sometimes painful when she sits down.   Reports hx fatty liver disease, non alcoholic  Requesting STD labs  Nexplanon for contraception in place  No other gyn complaints voiced    Review of Systems   Constitutional: Negative.    HENT: Negative.     Eyes: Negative.    Respiratory: Negative.     Cardiovascular: Negative.    Gastrointestinal:  Positive for diarrhea and rectal pain.   Endocrine: Negative.    Genitourinary: Negative.  Negative for difficulty urinating, frequency, menstrual irregularity, menstrual problem, pelvic pain, urgency, vaginal bleeding and vaginal discharge.   Musculoskeletal: Negative.    Integumentary:  Negative.   Allergic/Immunologic: Negative.    Neurological: Negative.    Psychiatric/Behavioral: Negative.       Current Medications[1]   Past Medical History:   Diagnosis Date    Bacterial vaginosis 2022    Elevated blood sugar 2022    Encounter for cervical Pap smear with pelvic exam 08/15/2022    Encounter for initial prescription of vaginal ring hormonal contraceptive 08/15/2022    Fatty liver disease, nonalcoholic     Gestational diabetes mellitus (GDM) in third trimester 11/15/2021    Gestational diabetes mellitus (GDM) in third trimester 11/15/2021    Gestational hypertension, third trimester 11/15/2021    Herpes simplex virus (HSV) infection     High risk medication use 2022    S/P  section 2022    Vaginal discharge 2022      Past Surgical History:   Procedure Laterality Date     SECTION N/A 2022    Procedure:  SECTION;  Surgeon: Michelle Cuadra MD;  Location: [facility] L&D;  Service: OB/GYN;  Laterality: N/A;      Family History   Problem Relation Name Age of Onset     Hypertension Father      Diabetes Mother      Hypertension Mother      No Known Problems Brother      Hypertension Brother      No Known Problems Daughter      Breast cancer Neg Hx      Colon cancer Neg Hx      Ovarian cancer Neg Hx           Objective     Physical Exam  Vitals reviewed. Exam conducted with a chaperone present.   Constitutional:       Appearance: Normal appearance.   HENT:      Head: Normocephalic.   Cardiovascular:      Rate and Rhythm: Normal rate and regular rhythm.   Pulmonary:      Effort: Pulmonary effort is normal.      Breath sounds: Normal breath sounds.   Chest:   Breasts:     Right: Normal. No mass.      Left: Normal. No mass.   Abdominal:      General: Abdomen is flat.      Palpations: Abdomen is soft.   Genitourinary:     General: Normal vulva.      Exam position: Lithotomy position.      Vagina: Normal. No vaginal discharge, erythema, tenderness, bleeding or lesions.      Cervix: No cervical motion tenderness, discharge, friability or lesion.      Uterus: Normal. Not enlarged and not tender.       Adnexa: Right adnexa normal and left adnexa normal.        Right: No mass, tenderness or fullness.          Left: No mass, tenderness or fullness.        Comments: Small external hemorrhoids noted, not thrombosed  Musculoskeletal:         General: Normal range of motion.      Cervical back: Normal range of motion.   Skin:     General: Skin is warm and dry.   Neurological:      Mental Status: She is alert and oriented to person, place, and time.   Psychiatric:         Mood and Affect: Mood normal.         Behavior: Behavior normal.        Assessment and Plan     1. Screen for STD (sexually transmitted disease)  -     Treponema Pallidum (Syphillis) Antibody; Future; Expected date: 08/05/2025  -     Hepatitis B Surface Antigen; Future; Expected date: 08/05/2025  -     Hepatitis C Antibody; Future; Expected date: 08/05/2025  -     HIV 1/2 Ag/Ab (4th Gen); Future; Expected date: 08/05/2025  -      Chlamydia/GC, PCR    2. Screening for cervical cancer  -     ThinPrep Pap Test    3. Vaginal discharge  -     Vaginosis Screen by DNA Probe    4. Hemorrhoids, unspecified hemorrhoid type  -     Ambulatory referral/consult to General Surgery; Future; Expected date: 08/12/2025    5. Frequent bowel movements  -     Ambulatory referral/consult to Gastroenterology; Future; Expected date: 08/12/2025    Patient was counseled today on Pap guidelines and recommendations for yearly pelvic exams, mammograms and monthly self breast awareness.  Counseled to see a PCP for other health maintenance.   Healthy diet and exercise encouraged.   Questions answered to desired level of satisfaction  Verbalized understanding to all information and instructions       Follow up in about 1 year (around 8/5/2026), or if symptoms worsen or fail to improve, for Annual Exam.         [1]   Current Outpatient Medications:     valACYclovir (VALTREX) 1000 MG tablet, Take 1 tablet (1,000 mg total) by mouth once daily., Disp: 30 tablet, Rfl: 11    Current Facility-Administered Medications:     etonogestreL 68 mg intradermal implant 68 mg, 68 mg, Implant, , Rosa Gamboa CNM, 68 mg at 01/23/25 0915

## 2025-08-07 LAB
GH SERPL-MCNC: ABNORMAL NG/ML
INSULIN SERPL-ACNC: ABNORMAL U[IU]/ML
LAB AP CLINICAL INFORMATION: ABNORMAL
LAB AP GYN INTERPRETATION: ABNORMAL
LAB AP PAP DISCLAIMER COMMENTS: ABNORMAL
RENIN PLAS-CCNC: ABNORMAL NG/ML/H

## 2025-08-21 ENCOUNTER — TELEPHONE (OUTPATIENT)
Dept: SURGERY | Facility: CLINIC | Age: 25
End: 2025-08-21
Payer: MEDICAID

## 2025-08-25 PROBLEM — O34.219 DESIRES VBAC (VAGINAL BIRTH AFTER CESAREAN) TRIAL: Status: RESOLVED | Noted: 2024-12-19 | Resolved: 2025-08-25

## 2025-08-25 PROBLEM — O34.219 VAGINAL BIRTH AFTER CESAREAN DELIVERY: Status: RESOLVED | Noted: 2024-12-19 | Resolved: 2025-08-25

## 2025-08-25 PROBLEM — O34.219 PREVIOUS CESAREAN DELIVERY AFFECTING PREGNANCY: Status: RESOLVED | Noted: 2024-12-16 | Resolved: 2025-08-25

## 2025-09-02 ENCOUNTER — OFFICE VISIT (OUTPATIENT)
Dept: GASTROENTEROLOGY | Facility: CLINIC | Age: 25
End: 2025-09-02
Payer: MEDICAID

## 2025-09-02 VITALS
DIASTOLIC BLOOD PRESSURE: 78 MMHG | OXYGEN SATURATION: 99 % | HEIGHT: 65 IN | SYSTOLIC BLOOD PRESSURE: 141 MMHG | WEIGHT: 214.38 LBS | BODY MASS INDEX: 35.72 KG/M2 | HEART RATE: 96 BPM | RESPIRATION RATE: 16 BRPM

## 2025-09-02 DIAGNOSIS — K76.0 FATTY LIVER: ICD-10-CM

## 2025-09-02 DIAGNOSIS — R19.7 DIARRHEA, UNSPECIFIED TYPE: Primary | ICD-10-CM

## 2025-09-02 PROCEDURE — 99215 OFFICE O/P EST HI 40 MIN: CPT | Mod: PBBFAC

## 2025-09-02 PROCEDURE — 99999 PR PBB SHADOW E&M-EST. PATIENT-LVL V: CPT | Mod: PBBFAC,,,

## 2025-09-02 RX ORDER — POLYETHYLENE GLYCOL 3350, SODIUM SULFATE ANHYDROUS, SODIUM BICARBONATE, SODIUM CHLORIDE, POTASSIUM CHLORIDE 236; 22.74; 6.74; 5.86; 2.97 G/4L; G/4L; G/4L; G/4L; G/4L
4 POWDER, FOR SOLUTION ORAL ONCE
Qty: 4000 ML | Refills: 0 | Status: SHIPPED | OUTPATIENT
Start: 2025-09-02 | End: 2025-09-02

## 2025-09-03 ENCOUNTER — TELEPHONE (OUTPATIENT)
Dept: GASTROENTEROLOGY | Facility: CLINIC | Age: 25
End: 2025-09-03
Payer: MEDICAID

## (undated) DEVICE — STAPLER INSORB 25 SUBCUTICULAR SKIN STRL

## (undated) DEVICE — STRIP WOUND CLOSURE 1/4 IN X 4 IN

## (undated) DEVICE — PAD GROUND ELECTROD DISP VALLEY

## (undated) DEVICE — SUTURE VICRYL 1 VIO CTX 36IN

## (undated) DEVICE — SURGICEL HEMOSTATIC 4X8 OXIDIZED CELLULOSE ABSORBABLE SHEET

## (undated) DEVICE — SOL IRRIGATION SALINE 0.9% 1000ML BOTTLE

## (undated) DEVICE — SUTURE PLAIN GUT 2-0 27 CT-1

## (undated) DEVICE — CDS C SECTION

## (undated) DEVICE — GLOVE SURGICAL PROTEXIS PI SIZE 7

## (undated) DEVICE — DRAPE L&D NON STERILE (ORDER 63957)

## (undated) DEVICE — CANISTER SUCTION MEDIVAC RIGID 1200CC W/FIL

## (undated) DEVICE — GLOVE SURGICAL PROTEXIS PI SIZE 6.5

## (undated) DEVICE — APPLICATOR CHLORAPREP HI-LITE TINTED ORANGE 26ML

## (undated) DEVICE — BARRIER INTERCEED ADHESION 3X4 ABSORBABLE